# Patient Record
Sex: MALE | Race: WHITE | NOT HISPANIC OR LATINO | Employment: OTHER | URBAN - NONMETROPOLITAN AREA
[De-identification: names, ages, dates, MRNs, and addresses within clinical notes are randomized per-mention and may not be internally consistent; named-entity substitution may affect disease eponyms.]

---

## 2021-12-30 ENCOUNTER — HOSPITAL ENCOUNTER (EMERGENCY)
Facility: HOSPITAL | Age: 61
Discharge: LEFT BEFORE DISCHARGE | End: 2021-12-30
Attending: EMERGENCY MEDICINE | Admitting: EMERGENCY MEDICINE

## 2021-12-30 ENCOUNTER — APPOINTMENT (OUTPATIENT)
Dept: GENERAL RADIOLOGY | Facility: HOSPITAL | Age: 61
End: 2021-12-30

## 2021-12-30 ENCOUNTER — APPOINTMENT (OUTPATIENT)
Dept: CT IMAGING | Facility: HOSPITAL | Age: 61
End: 2021-12-30

## 2021-12-30 VITALS
WEIGHT: 192 LBS | BODY MASS INDEX: 23.87 KG/M2 | SYSTOLIC BLOOD PRESSURE: 145 MMHG | HEIGHT: 75 IN | HEART RATE: 138 BPM | DIASTOLIC BLOOD PRESSURE: 92 MMHG | TEMPERATURE: 97.8 F | RESPIRATION RATE: 16 BRPM | OXYGEN SATURATION: 93 %

## 2021-12-30 DIAGNOSIS — I48.92 ATRIAL FLUTTER, UNSPECIFIED TYPE: Primary | ICD-10-CM

## 2021-12-30 LAB
ALBUMIN SERPL-MCNC: 4 G/DL (ref 3.5–5.2)
ALBUMIN/GLOB SERPL: 1.6 G/DL
ALP SERPL-CCNC: 85 U/L (ref 39–117)
ALT SERPL W P-5'-P-CCNC: 17 U/L (ref 1–41)
ANION GAP SERPL CALCULATED.3IONS-SCNC: 14 MMOL/L (ref 5–15)
AST SERPL-CCNC: 22 U/L (ref 1–40)
BASOPHILS # BLD AUTO: 0.03 10*3/MM3 (ref 0–0.2)
BASOPHILS NFR BLD AUTO: 0.4 % (ref 0–1.5)
BILIRUB SERPL-MCNC: 0.9 MG/DL (ref 0–1.2)
BUN SERPL-MCNC: 26 MG/DL (ref 8–23)
BUN/CREAT SERPL: 25.7 (ref 7–25)
CALCIUM SPEC-SCNC: 9.1 MG/DL (ref 8.6–10.5)
CHLORIDE SERPL-SCNC: 101 MMOL/L (ref 98–107)
CO2 SERPL-SCNC: 20 MMOL/L (ref 22–29)
CREAT SERPL-MCNC: 1.01 MG/DL (ref 0.76–1.27)
DEPRECATED RDW RBC AUTO: 46.3 FL (ref 37–54)
EOSINOPHIL # BLD AUTO: 0.02 10*3/MM3 (ref 0–0.4)
EOSINOPHIL NFR BLD AUTO: 0.3 % (ref 0.3–6.2)
ERYTHROCYTE [DISTWIDTH] IN BLOOD BY AUTOMATED COUNT: 13.6 % (ref 12.3–15.4)
FLUAV SUBTYP SPEC NAA+PROBE: NOT DETECTED
FLUBV RNA ISLT QL NAA+PROBE: NOT DETECTED
GFR SERPL CREATININE-BSD FRML MDRD: 75 ML/MIN/1.73
GLOBULIN UR ELPH-MCNC: 2.5 GM/DL
GLUCOSE SERPL-MCNC: 151 MG/DL (ref 65–99)
HCT VFR BLD AUTO: 45.6 % (ref 37.5–51)
HGB BLD-MCNC: 15.3 G/DL (ref 13–17.7)
HOLD SPECIMEN: NORMAL
IMM GRANULOCYTES # BLD AUTO: 0.02 10*3/MM3 (ref 0–0.05)
IMM GRANULOCYTES NFR BLD AUTO: 0.3 % (ref 0–0.5)
LYMPHOCYTES # BLD AUTO: 1.41 10*3/MM3 (ref 0.7–3.1)
LYMPHOCYTES NFR BLD AUTO: 19.1 % (ref 19.6–45.3)
MCH RBC QN AUTO: 31.4 PG (ref 26.6–33)
MCHC RBC AUTO-ENTMCNC: 33.6 G/DL (ref 31.5–35.7)
MCV RBC AUTO: 93.4 FL (ref 79–97)
MONOCYTES # BLD AUTO: 0.48 10*3/MM3 (ref 0.1–0.9)
MONOCYTES NFR BLD AUTO: 6.5 % (ref 5–12)
NEUTROPHILS NFR BLD AUTO: 5.42 10*3/MM3 (ref 1.7–7)
NEUTROPHILS NFR BLD AUTO: 73.4 % (ref 42.7–76)
NRBC BLD AUTO-RTO: 0 /100 WBC (ref 0–0.2)
NT-PROBNP SERPL-MCNC: 7792 PG/ML (ref 0–900)
PLATELET # BLD AUTO: 192 10*3/MM3 (ref 140–450)
PMV BLD AUTO: 11.1 FL (ref 6–12)
POTASSIUM SERPL-SCNC: 3.8 MMOL/L (ref 3.5–5.2)
PROT SERPL-MCNC: 6.5 G/DL (ref 6–8.5)
QT INTERVAL: 208 MS
QT INTERVAL: 260 MS
QTC INTERVAL: 316 MS
QTC INTERVAL: 471 MS
RBC # BLD AUTO: 4.88 10*6/MM3 (ref 4.14–5.8)
SARS-COV-2 RNA PNL SPEC NAA+PROBE: NOT DETECTED
SODIUM SERPL-SCNC: 135 MMOL/L (ref 136–145)
TROPONIN T SERPL-MCNC: <0.01 NG/ML (ref 0–0.03)
WBC NRBC COR # BLD: 7.38 10*3/MM3 (ref 3.4–10.8)
WHOLE BLOOD HOLD SPECIMEN: NORMAL

## 2021-12-30 PROCEDURE — 85025 COMPLETE CBC W/AUTO DIFF WBC: CPT | Performed by: PHYSICIAN ASSISTANT

## 2021-12-30 PROCEDURE — 87636 SARSCOV2 & INF A&B AMP PRB: CPT | Performed by: PHYSICIAN ASSISTANT

## 2021-12-30 PROCEDURE — 99283 EMERGENCY DEPT VISIT LOW MDM: CPT

## 2021-12-30 PROCEDURE — 96376 TX/PRO/DX INJ SAME DRUG ADON: CPT

## 2021-12-30 PROCEDURE — 71275 CT ANGIOGRAPHY CHEST: CPT

## 2021-12-30 PROCEDURE — 71045 X-RAY EXAM CHEST 1 VIEW: CPT

## 2021-12-30 PROCEDURE — 84484 ASSAY OF TROPONIN QUANT: CPT | Performed by: PHYSICIAN ASSISTANT

## 2021-12-30 PROCEDURE — 93005 ELECTROCARDIOGRAM TRACING: CPT | Performed by: EMERGENCY MEDICINE

## 2021-12-30 PROCEDURE — 36415 COLL VENOUS BLD VENIPUNCTURE: CPT

## 2021-12-30 PROCEDURE — 93005 ELECTROCARDIOGRAM TRACING: CPT | Performed by: PHYSICIAN ASSISTANT

## 2021-12-30 PROCEDURE — 93010 ELECTROCARDIOGRAM REPORT: CPT | Performed by: INTERNAL MEDICINE

## 2021-12-30 PROCEDURE — 83880 ASSAY OF NATRIURETIC PEPTIDE: CPT | Performed by: PHYSICIAN ASSISTANT

## 2021-12-30 PROCEDURE — 96365 THER/PROPH/DIAG IV INF INIT: CPT

## 2021-12-30 PROCEDURE — 80053 COMPREHEN METABOLIC PANEL: CPT | Performed by: PHYSICIAN ASSISTANT

## 2021-12-30 PROCEDURE — 0 IOPAMIDOL PER 1 ML: Performed by: EMERGENCY MEDICINE

## 2021-12-30 RX ORDER — DILTIAZEM HYDROCHLORIDE 180 MG/1
180 CAPSULE, EXTENDED RELEASE ORAL DAILY
Qty: 14 CAPSULE | Refills: 0 | Status: SHIPPED | OUTPATIENT
Start: 2021-12-30 | End: 2022-01-08 | Stop reason: HOSPADM

## 2021-12-30 RX ORDER — FUROSEMIDE 10 MG/ML
60 INJECTION INTRAMUSCULAR; INTRAVENOUS ONCE
Status: DISCONTINUED | OUTPATIENT
Start: 2021-12-30 | End: 2021-12-30 | Stop reason: HOSPADM

## 2021-12-30 RX ORDER — VENLAFAXINE 75 MG/1
75 TABLET ORAL 2 TIMES DAILY
COMMUNITY

## 2021-12-30 RX ORDER — OMEPRAZOLE 40 MG/1
40 CAPSULE, DELAYED RELEASE ORAL DAILY
COMMUNITY

## 2021-12-30 RX ORDER — TAMSULOSIN HYDROCHLORIDE 0.4 MG/1
1 CAPSULE ORAL DAILY
COMMUNITY

## 2021-12-30 RX ORDER — SODIUM CHLORIDE 0.9 % (FLUSH) 0.9 %
10 SYRINGE (ML) INJECTION AS NEEDED
Status: DISCONTINUED | OUTPATIENT
Start: 2021-12-30 | End: 2021-12-30 | Stop reason: HOSPADM

## 2021-12-30 RX ORDER — DILTIAZEM HYDROCHLORIDE 5 MG/ML
20 INJECTION INTRAVENOUS ONCE
Status: COMPLETED | OUTPATIENT
Start: 2021-12-30 | End: 2021-12-30

## 2021-12-30 RX ADMIN — IOPAMIDOL 82 ML: 755 INJECTION, SOLUTION INTRAVENOUS at 12:07

## 2021-12-30 RX ADMIN — SODIUM CHLORIDE 5 MG/HR: 900 INJECTION, SOLUTION INTRAVENOUS at 11:20

## 2021-12-30 RX ADMIN — DILTIAZEM HYDROCHLORIDE 20 MG: 5 INJECTION INTRAVENOUS at 10:41

## 2021-12-31 ENCOUNTER — APPOINTMENT (OUTPATIENT)
Dept: CARDIOLOGY | Facility: HOSPITAL | Age: 61
End: 2021-12-31

## 2021-12-31 ENCOUNTER — APPOINTMENT (OUTPATIENT)
Dept: GENERAL RADIOLOGY | Facility: HOSPITAL | Age: 61
End: 2021-12-31

## 2021-12-31 ENCOUNTER — HOSPITAL ENCOUNTER (INPATIENT)
Facility: HOSPITAL | Age: 61
LOS: 8 days | Discharge: HOME | End: 2022-01-08
Attending: EMERGENCY MEDICINE | Admitting: INTERNAL MEDICINE

## 2021-12-31 DIAGNOSIS — R60.9 PERIPHERAL EDEMA: ICD-10-CM

## 2021-12-31 DIAGNOSIS — F41.9 ANXIETY: ICD-10-CM

## 2021-12-31 DIAGNOSIS — I48.92 ATRIAL FLUTTER WITH RAPID VENTRICULAR RESPONSE: Primary | ICD-10-CM

## 2021-12-31 DIAGNOSIS — I50.9 ACUTE CONGESTIVE HEART FAILURE, UNSPECIFIED HEART FAILURE TYPE: ICD-10-CM

## 2021-12-31 DIAGNOSIS — R06.09 DOE (DYSPNEA ON EXERTION): ICD-10-CM

## 2021-12-31 PROBLEM — I50.22 CHRONIC SYSTOLIC CHF (CONGESTIVE HEART FAILURE): Status: ACTIVE | Noted: 2021-12-31

## 2021-12-31 PROBLEM — I48.0 PAROXYSMAL ATRIAL FIBRILLATION WITH RAPID VENTRICULAR RESPONSE: Status: ACTIVE | Noted: 2021-12-31

## 2021-12-31 LAB
ALBUMIN SERPL-MCNC: 3.8 G/DL (ref 3.5–5.2)
ALBUMIN/GLOB SERPL: 1.3 G/DL
ALP SERPL-CCNC: 87 U/L (ref 39–117)
ALT SERPL W P-5'-P-CCNC: 16 U/L (ref 1–41)
ANION GAP SERPL CALCULATED.3IONS-SCNC: 12 MMOL/L (ref 5–15)
AST SERPL-CCNC: 23 U/L (ref 1–40)
BASOPHILS # BLD AUTO: 0.03 10*3/MM3 (ref 0–0.2)
BASOPHILS NFR BLD AUTO: 0.4 % (ref 0–1.5)
BILIRUB SERPL-MCNC: 1.3 MG/DL (ref 0–1.2)
BUN SERPL-MCNC: 22 MG/DL (ref 8–23)
BUN/CREAT SERPL: 19.6 (ref 7–25)
CALCIUM SPEC-SCNC: 9.1 MG/DL (ref 8.6–10.5)
CHLORIDE SERPL-SCNC: 103 MMOL/L (ref 98–107)
CO2 SERPL-SCNC: 24 MMOL/L (ref 22–29)
CREAT SERPL-MCNC: 1.12 MG/DL (ref 0.76–1.27)
D-DIMER, QUANTITATIVE (MAD,POW, STR): 740 NG/ML (FEU) (ref 0–470)
DEPRECATED RDW RBC AUTO: 47.7 FL (ref 37–54)
EOSINOPHIL # BLD AUTO: 0.07 10*3/MM3 (ref 0–0.4)
EOSINOPHIL NFR BLD AUTO: 0.9 % (ref 0.3–6.2)
ERYTHROCYTE [DISTWIDTH] IN BLOOD BY AUTOMATED COUNT: 13.7 % (ref 12.3–15.4)
GFR SERPL CREATININE-BSD FRML MDRD: 66 ML/MIN/1.73
GLOBULIN UR ELPH-MCNC: 2.9 GM/DL
GLUCOSE SERPL-MCNC: 142 MG/DL (ref 65–99)
HCT VFR BLD AUTO: 44.9 % (ref 37.5–51)
HGB BLD-MCNC: 15.1 G/DL (ref 13–17.7)
HOLD SPECIMEN: NORMAL
IMM GRANULOCYTES # BLD AUTO: 0.02 10*3/MM3 (ref 0–0.05)
IMM GRANULOCYTES NFR BLD AUTO: 0.3 % (ref 0–0.5)
LYMPHOCYTES # BLD AUTO: 1.6 10*3/MM3 (ref 0.7–3.1)
LYMPHOCYTES NFR BLD AUTO: 21.5 % (ref 19.6–45.3)
MCH RBC QN AUTO: 31.9 PG (ref 26.6–33)
MCHC RBC AUTO-ENTMCNC: 33.6 G/DL (ref 31.5–35.7)
MCV RBC AUTO: 94.9 FL (ref 79–97)
MONOCYTES # BLD AUTO: 0.61 10*3/MM3 (ref 0.1–0.9)
MONOCYTES NFR BLD AUTO: 8.2 % (ref 5–12)
NEUTROPHILS NFR BLD AUTO: 5.1 10*3/MM3 (ref 1.7–7)
NEUTROPHILS NFR BLD AUTO: 68.7 % (ref 42.7–76)
NRBC BLD AUTO-RTO: 0 /100 WBC (ref 0–0.2)
NT-PROBNP SERPL-MCNC: 6616 PG/ML (ref 0–900)
PLATELET # BLD AUTO: 191 10*3/MM3 (ref 140–450)
PMV BLD AUTO: 10.8 FL (ref 6–12)
POTASSIUM SERPL-SCNC: 3.8 MMOL/L (ref 3.5–5.2)
PROT SERPL-MCNC: 6.7 G/DL (ref 6–8.5)
RBC # BLD AUTO: 4.73 10*6/MM3 (ref 4.14–5.8)
SODIUM SERPL-SCNC: 139 MMOL/L (ref 136–145)
TROPONIN T SERPL-MCNC: <0.01 NG/ML (ref 0–0.03)
WBC NRBC COR # BLD: 7.43 10*3/MM3 (ref 3.4–10.8)
WHOLE BLOOD HOLD SPECIMEN: NORMAL
WHOLE BLOOD HOLD SPECIMEN: NORMAL

## 2021-12-31 PROCEDURE — 25010000002 AMIODARONE IN DEXTROSE 5% 150-4.21 MG/100ML-% SOLUTION: Performed by: INTERNAL MEDICINE

## 2021-12-31 PROCEDURE — 25010000002 ONDANSETRON PER 1 MG

## 2021-12-31 PROCEDURE — 25010000002 AMIODARONE IN DEXTROSE 5% 360-4.14 MG/200ML-% SOLUTION: Performed by: INTERNAL MEDICINE

## 2021-12-31 PROCEDURE — 80053 COMPREHEN METABOLIC PANEL: CPT | Performed by: EMERGENCY MEDICINE

## 2021-12-31 PROCEDURE — 93306 TTE W/DOPPLER COMPLETE: CPT | Performed by: INTERNAL MEDICINE

## 2021-12-31 PROCEDURE — 99284 EMERGENCY DEPT VISIT MOD MDM: CPT

## 2021-12-31 PROCEDURE — 84484 ASSAY OF TROPONIN QUANT: CPT | Performed by: INTERNAL MEDICINE

## 2021-12-31 PROCEDURE — 93005 ELECTROCARDIOGRAM TRACING: CPT | Performed by: EMERGENCY MEDICINE

## 2021-12-31 PROCEDURE — 25010000002 FUROSEMIDE PER 20 MG

## 2021-12-31 PROCEDURE — 25010000002 ENOXAPARIN PER 10 MG: Performed by: EMERGENCY MEDICINE

## 2021-12-31 PROCEDURE — 71045 X-RAY EXAM CHEST 1 VIEW: CPT

## 2021-12-31 PROCEDURE — 93010 ELECTROCARDIOGRAM REPORT: CPT | Performed by: INTERNAL MEDICINE

## 2021-12-31 PROCEDURE — 83880 ASSAY OF NATRIURETIC PEPTIDE: CPT | Performed by: EMERGENCY MEDICINE

## 2021-12-31 PROCEDURE — 85379 FIBRIN DEGRADATION QUANT: CPT | Performed by: INTERNAL MEDICINE

## 2021-12-31 PROCEDURE — 36415 COLL VENOUS BLD VENIPUNCTURE: CPT

## 2021-12-31 PROCEDURE — 25010000002 MORPHINE PER 10 MG

## 2021-12-31 PROCEDURE — 84484 ASSAY OF TROPONIN QUANT: CPT | Performed by: EMERGENCY MEDICINE

## 2021-12-31 PROCEDURE — 93005 ELECTROCARDIOGRAM TRACING: CPT | Performed by: INTERNAL MEDICINE

## 2021-12-31 PROCEDURE — 93306 TTE W/DOPPLER COMPLETE: CPT

## 2021-12-31 PROCEDURE — 85025 COMPLETE CBC W/AUTO DIFF WBC: CPT | Performed by: EMERGENCY MEDICINE

## 2021-12-31 RX ORDER — AMIODARONE HYDROCHLORIDE 200 MG/1
200 TABLET ORAL EVERY 12 HOURS
Status: DISCONTINUED | OUTPATIENT
Start: 2022-01-11 | End: 2022-01-01

## 2021-12-31 RX ORDER — SODIUM CHLORIDE 0.9 % (FLUSH) 0.9 %
10 SYRINGE (ML) INJECTION AS NEEDED
Status: DISCONTINUED | OUTPATIENT
Start: 2021-12-31 | End: 2022-01-08 | Stop reason: HOSPADM

## 2021-12-31 RX ORDER — TAMSULOSIN HYDROCHLORIDE 0.4 MG/1
0.4 CAPSULE ORAL NIGHTLY
Status: DISCONTINUED | OUTPATIENT
Start: 2021-12-31 | End: 2022-01-05

## 2021-12-31 RX ORDER — FUROSEMIDE 10 MG/ML
INJECTION INTRAMUSCULAR; INTRAVENOUS
Status: DISPENSED
Start: 2021-12-31 | End: 2022-01-01

## 2021-12-31 RX ORDER — AMIODARONE HYDROCHLORIDE 200 MG/1
200 TABLET ORAL DAILY
Status: DISCONTINUED | OUTPATIENT
Start: 2022-01-26 | End: 2022-01-01

## 2021-12-31 RX ORDER — ASPIRIN 81 MG/1
325 TABLET, CHEWABLE ORAL DAILY
COMMUNITY

## 2021-12-31 RX ORDER — FUROSEMIDE 10 MG/ML
INJECTION INTRAMUSCULAR; INTRAVENOUS
Status: COMPLETED
Start: 2021-12-31 | End: 2021-12-31

## 2021-12-31 RX ORDER — PANTOPRAZOLE SODIUM 40 MG/1
40 TABLET, DELAYED RELEASE ORAL EVERY MORNING
Status: DISCONTINUED | OUTPATIENT
Start: 2021-12-31 | End: 2021-12-31

## 2021-12-31 RX ORDER — SODIUM CHLORIDE 0.9 % (FLUSH) 0.9 %
10 SYRINGE (ML) INJECTION EVERY 12 HOURS SCHEDULED
Status: DISCONTINUED | OUTPATIENT
Start: 2021-12-31 | End: 2022-01-08 | Stop reason: HOSPADM

## 2021-12-31 RX ORDER — ONDANSETRON 2 MG/ML
INJECTION INTRAMUSCULAR; INTRAVENOUS
Status: COMPLETED
Start: 2021-12-31 | End: 2021-12-31

## 2021-12-31 RX ORDER — MORPHINE SULFATE 2 MG/ML
2 INJECTION, SOLUTION INTRAMUSCULAR; INTRAVENOUS
Status: DISPENSED | OUTPATIENT
Start: 2021-12-31 | End: 2022-01-07

## 2021-12-31 RX ORDER — VENLAFAXINE 75 MG/1
75 TABLET ORAL 2 TIMES DAILY
Status: DISCONTINUED | OUTPATIENT
Start: 2021-12-31 | End: 2022-01-08 | Stop reason: HOSPADM

## 2021-12-31 RX ORDER — FUROSEMIDE 10 MG/ML
60 INJECTION INTRAMUSCULAR; INTRAVENOUS EVERY 12 HOURS
Status: DISCONTINUED | OUTPATIENT
Start: 2022-01-01 | End: 2021-12-31 | Stop reason: SDUPTHER

## 2021-12-31 RX ORDER — LOSARTAN POTASSIUM 25 MG/1
25 TABLET ORAL
Status: DISCONTINUED | OUTPATIENT
Start: 2021-12-31 | End: 2021-12-31

## 2021-12-31 RX ORDER — PANTOPRAZOLE SODIUM 40 MG/10ML
40 INJECTION, POWDER, LYOPHILIZED, FOR SOLUTION INTRAVENOUS
Status: DISCONTINUED | OUTPATIENT
Start: 2021-12-31 | End: 2022-01-08 | Stop reason: HOSPADM

## 2021-12-31 RX ORDER — AMIODARONE HYDROCHLORIDE 200 MG/1
200 TABLET ORAL EVERY 12 HOURS SCHEDULED
Status: DISCONTINUED | OUTPATIENT
Start: 2022-01-01 | End: 2022-01-01

## 2021-12-31 RX ORDER — MORPHINE SULFATE 2 MG/ML
INJECTION, SOLUTION INTRAMUSCULAR; INTRAVENOUS
Status: COMPLETED
Start: 2021-12-31 | End: 2021-12-31

## 2021-12-31 RX ORDER — FUROSEMIDE 10 MG/ML
60 INJECTION INTRAMUSCULAR; INTRAVENOUS EVERY 12 HOURS
Status: DISCONTINUED | OUTPATIENT
Start: 2021-12-31 | End: 2022-01-01

## 2021-12-31 RX ORDER — ONDANSETRON 2 MG/ML
4 INJECTION INTRAMUSCULAR; INTRAVENOUS EVERY 6 HOURS PRN
Status: DISCONTINUED | OUTPATIENT
Start: 2021-12-31 | End: 2022-01-08 | Stop reason: HOSPADM

## 2021-12-31 RX ORDER — FUROSEMIDE 10 MG/ML
20 INJECTION INTRAMUSCULAR; INTRAVENOUS EVERY 12 HOURS
Status: DISCONTINUED | OUTPATIENT
Start: 2021-12-31 | End: 2021-12-31

## 2021-12-31 RX ORDER — FUROSEMIDE 10 MG/ML
20 INJECTION INTRAMUSCULAR; INTRAVENOUS ONCE
Status: DISCONTINUED | OUTPATIENT
Start: 2021-12-31 | End: 2021-12-31

## 2021-12-31 RX ORDER — AMIODARONE HYDROCHLORIDE 200 MG/1
200 TABLET ORAL ONCE
Status: DISCONTINUED | OUTPATIENT
Start: 2022-01-01 | End: 2022-01-01

## 2021-12-31 RX ADMIN — ONDANSETRON 4 MG: 2 INJECTION INTRAMUSCULAR; INTRAVENOUS at 16:58

## 2021-12-31 RX ADMIN — AMIODARONE HYDROCHLORIDE 0.5 MG/MIN: 1.8 INJECTION, SOLUTION INTRAVENOUS at 20:45

## 2021-12-31 RX ADMIN — LOSARTAN POTASSIUM 25 MG: 25 TABLET, FILM COATED ORAL at 18:22

## 2021-12-31 RX ADMIN — APIXABAN 5 MG: 5 TABLET, FILM COATED ORAL at 14:37

## 2021-12-31 RX ADMIN — TAMSULOSIN HYDROCHLORIDE 0.4 MG: 0.4 CAPSULE ORAL at 20:03

## 2021-12-31 RX ADMIN — PANTOPRAZOLE SODIUM 40 MG: 40 INJECTION, POWDER, FOR SOLUTION INTRAVENOUS at 19:51

## 2021-12-31 RX ADMIN — MORPHINE SULFATE 2 MG: 2 INJECTION, SOLUTION INTRAMUSCULAR; INTRAVENOUS at 16:59

## 2021-12-31 RX ADMIN — FUROSEMIDE 60 MG: 10 INJECTION, SOLUTION INTRAMUSCULAR; INTRAVENOUS at 18:20

## 2021-12-31 RX ADMIN — SODIUM CHLORIDE, PRESERVATIVE FREE 10 ML: 5 INJECTION INTRAVENOUS at 20:05

## 2021-12-31 RX ADMIN — FUROSEMIDE 20 MG: 10 INJECTION INTRAMUSCULAR; INTRAVENOUS at 16:59

## 2021-12-31 RX ADMIN — VENLAFAXINE 75 MG: 75 TABLET ORAL at 18:22

## 2021-12-31 RX ADMIN — FUROSEMIDE 20 MG: 10 INJECTION, SOLUTION INTRAMUSCULAR; INTRAVENOUS at 16:59

## 2021-12-31 RX ADMIN — METOPROLOL TARTRATE 25 MG: 25 TABLET, FILM COATED ORAL at 14:37

## 2021-12-31 RX ADMIN — PANTOPRAZOLE SODIUM 40 MG: 40 TABLET, DELAYED RELEASE ORAL at 14:36

## 2021-12-31 RX ADMIN — APIXABAN 5 MG: 5 TABLET, FILM COATED ORAL at 20:03

## 2021-12-31 RX ADMIN — AMIODARONE HYDROCHLORIDE 1 MG/MIN: 1.8 INJECTION, SOLUTION INTRAVENOUS at 14:36

## 2021-12-31 RX ADMIN — AMIODARONE HYDROCHLORIDE 150 MG: 1.5 INJECTION, SOLUTION INTRAVENOUS at 13:25

## 2021-12-31 RX ADMIN — ENOXAPARIN SODIUM 90 MG: 100 INJECTION SUBCUTANEOUS at 09:39

## 2021-12-31 RX ADMIN — SODIUM CHLORIDE 5 MG/HR: 900 INJECTION, SOLUTION INTRAVENOUS at 09:37

## 2022-01-01 PROBLEM — E44.0 MODERATE MALNUTRITION: Status: ACTIVE | Noted: 2022-01-01

## 2022-01-01 PROBLEM — I50.23 ACUTE ON CHRONIC SYSTOLIC CHF (CONGESTIVE HEART FAILURE): Status: ACTIVE | Noted: 2021-12-31

## 2022-01-01 LAB
LV EF 2D ECHO EST: 22 %
MAXIMAL PREDICTED HEART RATE: 157 BPM
STRESS TARGET HR: 133 BPM

## 2022-01-01 PROCEDURE — 25010000002 AMIODARONE IN DEXTROSE 5% 360-4.14 MG/200ML-% SOLUTION: Performed by: INTERNAL MEDICINE

## 2022-01-01 PROCEDURE — 25010000002 PROCHLORPERAZINE 10 MG/2ML SOLUTION: Performed by: FAMILY MEDICINE

## 2022-01-01 PROCEDURE — 25010000002 FUROSEMIDE PER 20 MG: Performed by: INTERNAL MEDICINE

## 2022-01-01 PROCEDURE — 93005 ELECTROCARDIOGRAM TRACING: CPT | Performed by: INTERNAL MEDICINE

## 2022-01-01 PROCEDURE — 93010 ELECTROCARDIOGRAM REPORT: CPT | Performed by: INTERNAL MEDICINE

## 2022-01-01 PROCEDURE — 25010000002 ONDANSETRON PER 1 MG: Performed by: INTERNAL MEDICINE

## 2022-01-01 PROCEDURE — 99222 1ST HOSP IP/OBS MODERATE 55: CPT | Performed by: INTERNAL MEDICINE

## 2022-01-01 PROCEDURE — 25010000002 MORPHINE PER 10 MG: Performed by: INTERNAL MEDICINE

## 2022-01-01 RX ORDER — PROCHLORPERAZINE EDISYLATE 5 MG/ML
10 INJECTION INTRAMUSCULAR; INTRAVENOUS EVERY 4 HOURS PRN
Status: DISCONTINUED | OUTPATIENT
Start: 2022-01-01 | End: 2022-01-08 | Stop reason: HOSPADM

## 2022-01-01 RX ORDER — PROCHLORPERAZINE 25 MG
25 SUPPOSITORY, RECTAL RECTAL EVERY 12 HOURS PRN
Status: DISCONTINUED | OUTPATIENT
Start: 2022-01-01 | End: 2022-01-08 | Stop reason: HOSPADM

## 2022-01-01 RX ORDER — AMIODARONE HYDROCHLORIDE 200 MG/1
200 TABLET ORAL EVERY 12 HOURS SCHEDULED
Status: DISCONTINUED | OUTPATIENT
Start: 2022-01-01 | End: 2022-01-04

## 2022-01-01 RX ORDER — FUROSEMIDE 10 MG/ML
40 INJECTION INTRAMUSCULAR; INTRAVENOUS EVERY 12 HOURS
Status: DISCONTINUED | OUTPATIENT
Start: 2022-01-01 | End: 2022-01-02

## 2022-01-01 RX ORDER — PROCHLORPERAZINE MALEATE 5 MG/1
5 TABLET ORAL EVERY 6 HOURS PRN
Status: DISCONTINUED | OUTPATIENT
Start: 2022-01-01 | End: 2022-01-08 | Stop reason: HOSPADM

## 2022-01-01 RX ADMIN — ONDANSETRON 4 MG: 2 INJECTION INTRAMUSCULAR; INTRAVENOUS at 21:10

## 2022-01-01 RX ADMIN — METOPROLOL TARTRATE 12.5 MG: 25 TABLET, FILM COATED ORAL at 08:39

## 2022-01-01 RX ADMIN — SODIUM CHLORIDE, PRESERVATIVE FREE 10 ML: 5 INJECTION INTRAVENOUS at 08:40

## 2022-01-01 RX ADMIN — MORPHINE SULFATE 2 MG: 2 INJECTION, SOLUTION INTRAMUSCULAR; INTRAVENOUS at 21:10

## 2022-01-01 RX ADMIN — AMIODARONE HYDROCHLORIDE 200 MG: 200 TABLET ORAL at 21:51

## 2022-01-01 RX ADMIN — FUROSEMIDE 40 MG: 10 INJECTION, SOLUTION INTRAVENOUS at 18:16

## 2022-01-01 RX ADMIN — AMIODARONE HYDROCHLORIDE 0.5 MG/MIN: 1.8 INJECTION, SOLUTION INTRAVENOUS at 10:01

## 2022-01-01 RX ADMIN — VENLAFAXINE 75 MG: 75 TABLET ORAL at 08:39

## 2022-01-01 RX ADMIN — AMIODARONE HYDROCHLORIDE 200 MG: 200 TABLET ORAL at 13:57

## 2022-01-01 RX ADMIN — ONDANSETRON 4 MG: 2 INJECTION INTRAMUSCULAR; INTRAVENOUS at 11:59

## 2022-01-01 RX ADMIN — PROCHLORPERAZINE EDISYLATE 10 MG: 5 INJECTION INTRAMUSCULAR; INTRAVENOUS at 22:22

## 2022-01-01 RX ADMIN — FUROSEMIDE 60 MG: 10 INJECTION, SOLUTION INTRAMUSCULAR; INTRAVENOUS at 06:01

## 2022-01-01 RX ADMIN — APIXABAN 5 MG: 5 TABLET, FILM COATED ORAL at 08:39

## 2022-01-01 RX ADMIN — AMIODARONE HYDROCHLORIDE 0.5 MG/MIN: 1.8 INJECTION, SOLUTION INTRAVENOUS at 23:49

## 2022-01-01 RX ADMIN — PANTOPRAZOLE SODIUM 40 MG: 40 INJECTION, POWDER, FOR SOLUTION INTRAVENOUS at 06:01

## 2022-01-01 RX ADMIN — SODIUM CHLORIDE, PRESERVATIVE FREE 10 ML: 5 INJECTION INTRAVENOUS at 22:10

## 2022-01-01 NOTE — CONSULTS
"Adult Nutrition  Assessment    Patient Name:  Kd Campa  YOB: 1960  MRN: [id number]  Admit Date:  12/31/2021    Assessment Date:  1/1/2022    Comments:  62yo male admit w/ Afib w/ RVR and hx CHF- amio gtt in place w/ IV lasix BID. RN notes minimal change in UOP after administration lasix. Cardiac diet, intake 75% this am. # w/ BMI 27.0. No hx available in Epic. pt states no c/s problems despite missing some teeth. He states he WILL NOT follow low Na daiet- has been told to take his diuretics and \"everything in moderation\" and he will be fine. Pt also reports he has decreased appetite and wt loss of 20# in past few months. He states he lives alone and just \"doenst think about eating\". Noted mild to  Moderate fat loss and muscle wasting to upper body. Pt with 8% wt loss w/ ? Time frame. Pt meets criteria for moderate, chronic malnutrition at this time r/t not meeting EEN d/t decreased po. Discussed nutrient dense foods, and eating multiple small meals/day. Will attach diet inforamtion re: high colton/pro foods and encouraged to keep salt use low. RD to follow hospital course.     Reason for Assessment     Row Name 01/01/22 1148          Reason for Assessment    Reason For Assessment nurse/nurse practitioner consult     Diagnosis cardiac disease     Identified At Risk by Screening Criteria MST SCORE 2+; reduced oral intake over the last month; unintentional loss of 10 lbs or more in the past 2 mos                Nutrition/Diet History     Row Name 01/01/22 1145          Nutrition/Diet History    Typical Food/Fluid Intake pt states nkfa, no c/s problems despite missing some teeth. He states he WILL NOT follow low Na daiet- has been told to take his diuretics and \"everything in moderation\" and he will be fine. Pt also reports he has decreased appetite and wt loss of 20# in past few months. He states he livves alone and just \"doenst tthink about eating\". Discussed nutrient dense foods, and " "eating multiple small meals/day. Will attach diet inforamtion re\" high colton/pro foods and encouraged to keep salt use low.     Food Preferences likes milk/cereal, likes ensure, cottage cheese     Factors Affecting Nutritional Intake anorexia                Anthropometrics     Row Name 01/01/22 0404          Anthropometrics    Weight 98.2 kg (216 lb 7.9 oz)                Labs/Tests/Procedures/Meds     Row Name 01/01/22 1152          Labs/Procedures/Meds    Lab Results Reviewed reviewed     Lab Results Comments Glu 142, alb 3.1L            Diagnostic Tests/Procedures    Diagnostic Test/Procedure Reviewed reviewed            Medications    Pertinent Medications Reviewed reviewed     Pertinent Medications Comments amio gtt, IV lasix BID                  Estimated/Assessed Needs     Row Name 01/01/22 1152          Calculation Measurements    Weight Used For Calculations 98 kg (216 lb)            Estimated/Assessed Needs    Additional Documentation Calorie Requirements (Group); KCAL/KG (Group); Protein Requirements (Group); Fluid Requirements (Group)            Calorie Requirements    Estimated Calorie Requirement (kcal/day) 2500            KCAL/KG    KCAL/KG 25 Kcal/Kg (kcal)     25 Kcal/Kg (kcal) 2449.425            Protein Requirements    Weight Used For Protein Calculations 98 kg (216 lb)     Est Protein Requirement Amount (gms/kg) 1.0 gm protein     Estimated Protein Requirements (gms/day) 97.98            Fluid Requirements    Fluid Requirements (mL/day) 2000     RDA Method (mL) 2000                Nutrition Prescription Ordered     Row Name 01/01/22 1153          Nutrition Prescription PO    Current PO Diet Regular     Common Modifiers Cardiac                Evaluation of Received Nutrient/Fluid Intake     Row Name 01/01/22 1153          PO Evaluation    Number of Meals 1     % PO Intake 75                  Malnutrition Severity Assessment     Row Name 01/01/22 1153          Malnutrition Severity Assessment    " "Malnutrition Type Chronic Disease - Related Malnutrition            Insufficient Energy Intake     Insufficient Energy Intake Findings Moderate     Insufficient Energy Intake  --  decreased for \"some time\"            Unintentional Weight Loss     Unintentional Weight Loss Findings Moderate     Unintentional Weight Loss  --  wt loss 20#, unsur etime frame \"some time\"            Muscle Loss    Loss of Muscle Mass Findings Moderate     Mormon Region Moderate - slight depression     Clavicle Bone Region --  mild     Acromion Bone Region --  mild     Dorsal Hand Region Moderate - slight depression            Fat Loss    Subcutaneous Fat Loss Findings Moderate     Orbital Region  Moderate -  somewhat hollowness, slightly dark circles     Upper Arm Region Moderate - some fat tissue, not ample            Criteria Met (Must meet criteria for severity in at least 2 of these categories: M Wasting, Fat Loss, Fluid, Secondary Signs, Wt. Status, Intake)    Patient meets criteria for  Moderate (non-severe) Malnutrition                 Electronically signed by:  Gabrielle Lane RD  01/01/22 11:57 CST  "

## 2022-01-01 NOTE — PLAN OF CARE
Goal Outcome Evaluation:  Plan of Care Reviewed With: patient        Progress: no change  Outcome Summary: Pt on Amio gtt at 16.67ml/hr as of  20:45 on 12/31.  Pt Afib unresolved with periods of hypotension and 1 event of nausea/vomiting during a hypotensive event.  Pt is otherwise asymptomic, but 02 sats trend low into 80's during sleep.  Pt UOP is minimally responsive to lasix administration.  Will continue to monitor.

## 2022-01-01 NOTE — PLAN OF CARE
Goal Outcome Evaluation:  Plan of Care Reviewed With: patient   Pt remains on Amio gtt, V/S stable, except HR elevated, no fever, adequate urine output, BM x 2 today, nc now @ 2L - pt tolerating well

## 2022-01-01 NOTE — PROGRESS NOTES
Progress Note  Celso Méndez MD  Hospitalist    Date of visit: 1/1/2022     LOS: 1 day   Patient Care Team:  Estella Gutierrez MD as PCP - General (Family Medicine)    Chief Complaint: palpitations    Subjective     Interval History:     Patient Complaints: palpitations due to uncontrolled atrial fibrillation.    History taken from: patient / nursing    Medication Review:   Current Facility-Administered Medications   Medication Dose Route Frequency Provider Last Rate Last Admin   • amiodarone (PACERONE) tablet 200 mg  200 mg Oral Q12H Celso Méndez MD   200 mg at 01/01/22 1357   • amiodarone 360 mg in 200 mL D5W infusion  0.5 mg/min Intravenous Continuous Celso Méndez MD 16.67 mL/hr at 01/01/22 1001 0.5 mg/min at 01/01/22 1001   • amiodarone 360 mg in 200 mL D5W infusion  0.5 mg/min Intravenous Continuous Altagracia Mantilla MD 16.67 mL/hr at 01/01/22 1349 0.5 mg/min at 01/01/22 1349   • apixaban (ELIQUIS) tablet 5 mg  5 mg Oral Q12H Celso Méndez MD   5 mg at 01/01/22 0839   • furosemide (LASIX) injection 40 mg  40 mg Intravenous Q12H Celso Méndez MD       • metoprolol tartrate (LOPRESSOR) half tablet 12.5 mg  12.5 mg Oral Q12H Celso Méndez MD   12.5 mg at 01/01/22 0839   • morphine injection 2 mg  2 mg Intravenous Q2H PRN Celso Méndez MD   2 mg at 12/31/21 1659   • ondansetron (ZOFRAN) injection 4 mg  4 mg Intravenous Q6H PRN Celso Méndez MD   4 mg at 01/01/22 1159   • pantoprazole (PROTONIX) injection 40 mg  40 mg Intravenous Q AM Celso Méndez MD   40 mg at 01/01/22 0601   • sodium chloride 0.9 % flush 10 mL  10 mL Intravenous PRN Celso Méndez MD       • sodium chloride 0.9 % flush 10 mL  10 mL Intravenous Q12H Celso Méndez MD   10 mL at 01/01/22 0840   • sodium chloride 0.9 % flush 10 mL  10 mL Intravenous PRN Celso Méndez MD       • tamsulosin (FLOMAX) 24 hr capsule 0.4 mg  0.4 mg Oral Nightly Celso Méndez MD   0.4 mg at 12/31/21 2003   • venlafaxine (EFFEXOR) tablet 75 mg  75 mg  Oral BID Celso Méndez MD   75 mg at 01/01/22 0839       Review of Systems:   Review of Systems   Constitutional: Positive for fatigue. Negative for fever.   Respiratory: Positive for shortness of breath. Negative for cough and wheezing.    Cardiovascular: Positive for palpitations and leg swelling. Negative for chest pain.   Gastrointestinal: Negative for abdominal distention, anal bleeding, constipation, diarrhea, nausea and vomiting.   Genitourinary: Negative for flank pain, frequency, hematuria, penile pain and urgency.   Musculoskeletal: Positive for arthralgias.   Skin: Positive for pallor. Negative for color change and rash.   Neurological: Positive for weakness. Negative for syncope, speech difficulty and headaches.   Psychiatric/Behavioral: Negative for agitation, behavioral problems and confusion.   All other systems reviewed and are negative.      Objective     Vital Signs  Temp:  [96.4 °F (35.8 °C)-97.9 °F (36.6 °C)] 96.9 °F (36.1 °C)  Heart Rate:  [119-133] 119  Resp:  [19-32] 19  BP: ()/(51-91) 105/66    Physical Exam:  Physical Exam  Vitals reviewed.   Constitutional:       General: He is not in acute distress.     Appearance: He is ill-appearing.   HENT:      Head: Normocephalic and atraumatic.   Eyes:      General: No scleral icterus.     Extraocular Movements: Extraocular movements intact.      Pupils: Pupils are equal, round, and reactive to light.   Cardiovascular:      Rate and Rhythm: Tachycardia present. Rhythm irregular.   Pulmonary:      Effort: Pulmonary effort is normal. No respiratory distress.      Breath sounds: No stridor. No wheezing or rales.   Chest:      Chest wall: No tenderness.   Abdominal:      General: Bowel sounds are normal. There is no distension.      Palpations: Abdomen is soft. There is no mass.      Tenderness: There is no abdominal tenderness. There is no right CVA tenderness, left CVA tenderness or guarding.   Musculoskeletal:         General: No swelling,  tenderness or deformity. Normal range of motion.      Cervical back: Normal range of motion and neck supple. No rigidity or tenderness.      Right lower leg: No edema.      Left lower leg: Edema present.   Skin:     Coloration: Skin is pale. Skin is not jaundiced.      Findings: No bruising or lesion.   Neurological:      General: No focal deficit present.      Mental Status: He is alert and oriented to person, place, and time. Mental status is at baseline.      Cranial Nerves: No cranial nerve deficit.      Sensory: No sensory deficit.      Motor: No weakness.   Psychiatric:         Mood and Affect: Mood normal.         Behavior: Behavior normal.          Results Review:    Lab Results (last 24 hours)     Procedure Component Value Units Date/Time    Troponin [[id number]]  (Normal) Collected: 12/31/21 1739    Specimen: Blood Updated: 12/31/21 1847     Troponin T <0.010 ng/mL     Narrative:      Troponin T Reference Range:  <= 0.03 ng/mL-   Negative for AMI  >0.03 ng/mL-     Abnormal for myocardial necrosis.  Clinicians would have to utilize clinical acumen, EKG, Troponin and serial changes to determine if it is an Acute Myocardial Infarction or myocardial injury due to an underlying chronic condition.       Results may be falsely decreased if patient taking Biotin.      D-dimer, Quantitative [[id number]]  (Abnormal) Collected: 12/31/21 1739    Specimen: Blood Updated: 12/31/21 1832     D-Dimer, Quantitative 740 ng/mL (FEU)     Narrative:      Dimer values <500 ng/ml FEU are FDA approved as aid in diagnosis of deep venous thrombosis and pulmonary embolism.  This test should not be used in an exclusion strategy with pretest probability alone.    A recent guideline regarding diagnosis for pulmonary thromboembolism recommends an adjusted exclusion criterion of age x 10 ng/ml FEU for patients >50 years of age ([employer] Intern Med 2015; 163: 701-711).            Imaging Results (Last 24 Hours)     ** No results found for the  last 24 hours. **          Assessment/Plan       Paroxysmal atrial fibrillation with rapid ventricular response (HCC)    Acute on chronic systolic CHF (congestive heart failure) (HCC)    Moderate malnutrition (CMS/HCC)    Continue with the IV Lasix, Metoprolol at smaller doses given the borderline blood pressure values, continue with the Amiodarone and Eliquis. Cardiology consult appreciated.    The recent Echo shows a poor LV EF of 20 to 25%. Repeat ECG in AM.    I confirmed that the patient's Advance Care Plan is present, code status is documented, or surrogate decision maker is listed in the patient's medical record.      Celso Méndez MD  01/01/22  14:15 CST

## 2022-01-01 NOTE — PLAN OF CARE
"  Problem: Adult Inpatient Plan of Care  Goal: Plan of Care Review  Outcome: Ongoing, Progressing  Flowsheets (Taken 1/1/2022 1202)  Plan of Care Reviewed With:   patient   family   Goal Outcome Evaluation:  Plan of Care Reviewed With: patient, family            Cardiac diet, intake 75% this am. # w/ BMI 27.0.  Pt states he WILL NOT follow low Na diet- has been told to take his diuretics and \"everything in moderation\" and he will be fine. Pt also reports he has decreased appetite and wt loss of 20# in past few months. He states he lives alone and just \"doenst think about eating\". Noted mild to moderate fat loss and muscle wasting to upper body. Pt with 8% wt loss w/ ? Time frame. Pt meets criteria for moderate, chronic malnutrition at this time r/t not meeting EEN d/t decreased po. Discussed nutrient dense foods, and eating multiple small meals/day. Will attach diet inforamtion re: high colton/pro foods and encouraged to keep salt use low. RD following.   "

## 2022-01-01 NOTE — CONSULTS
Cardiology at [facility]  Cardiovascular Consultation Note      Kd Campa  13/A  [id number]  1960    DATE OF ADMISSION: 12/31/2021  DATE OF CONSULTATION:  1/1/2022    Estella Gutierrez MD  Treatment Team:   Attending Provider: Celso Méndez MD  Consulting Physician: Altagracia Mantilla MD  Admitting Provider: Celso Méndez MD    Chief Complaint   Patient presents with   • Shortness of Breath   • Fluid Retention       Chief complaint/ Reason for Consultation atrial fibrillation with rapid ventricular rate and congestive heart failure due to reduced left and systolic function      History of Present Illness  61 years old patient with background history of paroxysmal atrial fibrillation s/p EP study and A. fib ablation in the [place] area more than 3 years ago and a history of congestive heart failure have some weak muscle was on Lasix and blood pressure medicine discontinued.  The patient presented for evaluation increasing shortness of breath functional class III, no  orthopnea or PND noted in atrial fibrillation with rapid ventricular rate and clinically patient was in congestive heart failure with elevated BNP mild lower extremity edema and chest x-ray bilateral pleural effusion no evidence of pulmonary embolism on CT pulmonary angiogram.  Troponin is normal.  Patient received 80 mg Lasix yesterday with a significant provement in shortness of breath and good diuresis.  He has marginal hemodynamic and we are unable to uptitrate beta-blocker and to start Aldactone or ACE/ARB at this stage.  Patient is on IV amiodarone with improvement in heart rate.  Last blood pressure systolic was 91 and echocardiogram severe LV dysfunction global hypokinesis significant elevated left atrial volume and moderate mitral and tricuspid regurgitations    Echo 1/1/2022    · The left ventricular cavity is moderately dilated.  · Estimated left ventricular EF = 22% Left ventricular ejection fraction  appears to be 21 - 25%. Left ventricular systolic function is severely decreased.  · Left ventricular diastolic dysfunction is noted.  · Left atrial volume is severely increased.  · The right atrial cavity is mildly dilated.  · Moderate mitral valve regurgitation is present.  · Moderate tricuspid valve regurgitation is present.  · There is a left pleural effusion.        proBNP   0.0 - 900.0 pg/mL 6,616.0 High   7,792.0 High       CT pulmonary angiogram    IMPRESSION:  1. No evidence for pulmonary embolus.  2. Moderate-sized bilateral pleural effusions with dependent  bibasilar atelectasis.    Past Medical History:   Diagnosis Date   • Atrial fibrillation (HCC)    • BPH (benign prostatic hyperplasia)    • Chronic bronchitis (HCC)    • Chronic systolic heart failure (HCC)    • Depression    • GERD (gastroesophageal reflux disease)        Past Surgical History:   Procedure Laterality Date   • CARDIAC ELECTROPHYSIOLOGY STUDY AND ABLATION     • CARDIOVERSION         No Known Allergies    No current facility-administered medications on file prior to encounter.     Current Outpatient Medications on File Prior to Encounter   Medication Sig Dispense Refill   • aspirin 81 MG chewable tablet Chew 325 mg Daily.     • omeprazole (priLOSEC) 40 MG capsule Take 40 mg by mouth Daily.     • tamsulosin (FLOMAX) 0.4 MG capsule 24 hr capsule Take 1 capsule by mouth Daily.     • venlafaxine (EFFEXOR) 75 MG tablet Take 75 mg by mouth 2 (Two) Times a Day.     • dilTIAZem XR (DILACOR XR) 180 MG 24 hr capsule Take 1 capsule by mouth Daily for 14 days. 14 capsule 0       Social History     Socioeconomic History   • Marital status:    Tobacco Use   • Smoking status: Current Some Day Smoker   • Smokeless tobacco: Never Used   Substance and Sexual Activity   • Alcohol use: Not Currently   • Drug use: Not Currently   • Sexual activity: Not Currently           Review of Systems:     Constitutional: Positive for fatigue. Negative for  "fever.   Respiratory: Positive for shortness of breath. Negative for cough.    Cardiovascular: Positive for leg swelling. Negative for chest pain and palpitations.   Gastrointestinal: Negative for abdominal distention, abdominal pain, blood in stool, diarrhea and vomiting.   Genitourinary: Negative for dysuria, frequency, genital sores, penile discharge, penile swelling and urgency.   Musculoskeletal: Positive for arthralgias. Negative for back pain.   Skin: Positive for pallor. Negative for color change and rash.   Neurological: Positive for weakness. Negative for seizures, syncope, light-headedness and headaches.   Psychiatric/Behavioral: Negative for agitation, behavioral problems and confusion.   All other systems reviewed and are negative.         Objective:     Vitals:    01/01/22 1030 01/01/22 1100 01/01/22 1140 01/01/22 1200   BP: (!) 84/58 110/71 91/67    BP Location:       Patient Position:       Pulse: (!) 121 (!) 121 (!) 121    Resp:       Temp:    96.9 °F (36.1 °C)   TempSrc:       SpO2: 94% 95% 98%    Weight:       Height:         Body mass index is 27.06 kg/m².  Flowsheet Rows      First Filed Value   Admission Height 190.5 cm (75\") Documented at 12/31/2021 1602   Admission Weight 87.1 kg (192 lb 0.3 oz) Documented at 12/31/2021 1559          Intake/Output Summary (Last 24 hours) at 1/1/2022 1333  Last data filed at 1/1/2022 1016  Gross per 24 hour   Intake 903.5 ml   Output 1170 ml   Net -266.5 ml         Physical Exam   Constitutional: Cooperative, alert and oriented, well developed, well nourished, in no acute distress.     HENT:   Head: Normocephalic, conjunctivae is a pink, thyroid is nonpalpable no carotid bruit and trachea central.     Cardiovascular: Tachycardia with irregular rate rhythm positive systolic murmur at the mitral valve area at length left sternal border    Pulmonary/Chest: Chest: Decreased bilateral air entry with crackles    Abdominal: Abdomen soft, bowel sounds normoactive, no " masses.    Musculoskeletal: No deformities, clubbing, cyanosis, erythema. Positive mild edema.    Neurological: No gross motor or sensory deficits noted    Skin: Warm and dry to the touch, no apparent skin lesions .     Psychiatric: Normal mood and affect. Behavior is normal.    Radiology Review    XR Chest 1 View   Final Result   1.  Left upper lobe partially calcified granuloma is again noted   as seen on recent CT chest exam of December 30, 2021. This would   be consistent with old granulomatous disease.   2.  Otherwise unremarkable chest.      Electronically signed by:  Vikas Pitt MD  12/31/2021 9:31 AM CST   Workstation: [id number]          Lab Results:  Lab Results (last 24 hours)     Procedure Component Value Units Date/Time    Troponin [[id number]]  (Normal) Collected: 12/31/21 1739    Specimen: Blood Updated: 12/31/21 1847     Troponin T <0.010 ng/mL     Narrative:      Troponin T Reference Range:  <= 0.03 ng/mL-   Negative for AMI  >0.03 ng/mL-     Abnormal for myocardial necrosis.  Clinicians would have to utilize clinical acumen, EKG, Troponin and serial changes to determine if it is an Acute Myocardial Infarction or myocardial injury due to an underlying chronic condition.       Results may be falsely decreased if patient taking Biotin.      D-dimer, Quantitative [[id number]]  (Abnormal) Collected: 12/31/21 1739    Specimen: Blood Updated: 12/31/21 1832     D-Dimer, Quantitative 740 ng/mL (FEU)     Narrative:      Dimer values <500 ng/ml FEU are FDA approved as aid in diagnosis of deep venous thrombosis and pulmonary embolism.  This test should not be used in an exclusion strategy with pretest probability alone.    A recent guideline regarding diagnosis for pulmonary thromboembolism recommends an adjusted exclusion criterion of age x 10 ng/ml FEU for patients >50 years of age ([employer] Intern Med 2015; 163: 701-711).            I personally viewed and interpreted the patient's EKG/Telemetry data        Assessment/Plan:   #1 atrial fibrillation with rapid ventricular rate of unknown duration    61 years old patient with previous history of paroxysmal atrial fibrillation s/p EP study and A. fib ablation at [place] more than 3 years ago had a history of congestive heart failure and left ventricular dysfunction not sure but ejection fraction.  Patient clinically in heart failure responded to IV diuretics had marginal hemodynamic.  We will continue IV amiodarone and also start the oral amiodarone.  We will try low-dose metoprolol but will hold ACE/ARB and Aldactone at this stage given the marginal hemodynamic    #2 congestive heart failure possible acute on chronic    She denies history of myocardial infarction in the past.  He does have history of congestive heart failure but doesn't know left ventricle systolic function.  Recent echocardiogram revealed severe left and systolic dysfunction dilated left ventricle severely elevated left atrial volume moderate mitral tricuspid regurgitations.  His hemodynamics are marginal we'll continue IV amiodarone and also start amiodarone 200 mg twice a day and start low-dose beta-blocker we'll hold ACE/ARB and Aldactone at this stage given the marginal hemodynamic I will arrange CHF evaluation  Given severe LV dysfunction he does need some ischemic evaluation timing depends on patient clinical condition and hospital course    Restrict 1000 fluid    Sodium 2 g to  Patient was counseled educated avoid nonsteroid and drink      #3 moderate mitral tricuspid regurgitations will reassess after electrical cardioversions as an outpatient    I spent 45  minutes caring for Kd on this date of service. This time includes time spent by me includes counseling/coordination of care as relates to the presenting problem and any ordered procedures/tests as outlined above.           Thank you for allowing me to participate in the care of Kd Tyrese Campa. Feel free to contact me directly  with any further questions or concerns.    Altagracia Mantilla MD  01/01/22  13:33 CST.      Part of this note may be an electronic transcription/translation of spoken language to printed text using the Dragon Dictation system.

## 2022-01-02 PROBLEM — I95.9 HYPOTENSION: Status: ACTIVE | Noted: 2022-01-02

## 2022-01-02 LAB
ANION GAP SERPL CALCULATED.3IONS-SCNC: 16 MMOL/L (ref 5–15)
ANION GAP SERPL CALCULATED.3IONS-SCNC: 9 MMOL/L (ref 5–15)
BUN SERPL-MCNC: 28 MG/DL (ref 8–23)
BUN SERPL-MCNC: 29 MG/DL (ref 8–23)
BUN/CREAT SERPL: 15.8 (ref 7–25)
BUN/CREAT SERPL: 18.1 (ref 7–25)
CALCIUM SPEC-SCNC: 9 MG/DL (ref 8.6–10.5)
CALCIUM SPEC-SCNC: 9.1 MG/DL (ref 8.6–10.5)
CHLORIDE SERPL-SCNC: 101 MMOL/L (ref 98–107)
CHLORIDE SERPL-SCNC: 104 MMOL/L (ref 98–107)
CO2 SERPL-SCNC: 22 MMOL/L (ref 22–29)
CO2 SERPL-SCNC: 26 MMOL/L (ref 22–29)
CREAT SERPL-MCNC: 1.55 MG/DL (ref 0.76–1.27)
CREAT SERPL-MCNC: 1.84 MG/DL (ref 0.76–1.27)
DEPRECATED RDW RBC AUTO: 50.7 FL (ref 37–54)
ERYTHROCYTE [DISTWIDTH] IN BLOOD BY AUTOMATED COUNT: 14.5 % (ref 12.3–15.4)
GFR SERPL CREATININE-BSD FRML MDRD: 37 ML/MIN/1.73
GFR SERPL CREATININE-BSD FRML MDRD: 46 ML/MIN/1.73
GLUCOSE SERPL-MCNC: 137 MG/DL (ref 65–99)
GLUCOSE SERPL-MCNC: 202 MG/DL (ref 65–99)
HCT VFR BLD AUTO: 47.8 % (ref 37.5–51)
HGB BLD-MCNC: 15.1 G/DL (ref 13–17.7)
MAGNESIUM SERPL-MCNC: 1.7 MG/DL (ref 1.6–2.4)
MCH RBC QN AUTO: 30.6 PG (ref 26.6–33)
MCHC RBC AUTO-ENTMCNC: 31.6 G/DL (ref 31.5–35.7)
MCV RBC AUTO: 96.8 FL (ref 79–97)
PLATELET # BLD AUTO: 206 10*3/MM3 (ref 140–450)
PMV BLD AUTO: 11.4 FL (ref 6–12)
POTASSIUM SERPL-SCNC: 3.7 MMOL/L (ref 3.5–5.2)
POTASSIUM SERPL-SCNC: 3.9 MMOL/L (ref 3.5–5.2)
QT INTERVAL: 270 MS
QT INTERVAL: 328 MS
QT INTERVAL: 344 MS
QT INTERVAL: 416 MS
QTC INTERVAL: 387 MS
QTC INTERVAL: 476 MS
QTC INTERVAL: 526 MS
QTC INTERVAL: 592 MS
RBC # BLD AUTO: 4.94 10*6/MM3 (ref 4.14–5.8)
SODIUM SERPL-SCNC: 139 MMOL/L (ref 136–145)
SODIUM SERPL-SCNC: 139 MMOL/L (ref 136–145)
TROPONIN T SERPL-MCNC: <0.01 NG/ML (ref 0–0.03)
WBC NRBC COR # BLD: 10.04 10*3/MM3 (ref 3.4–10.8)
WHOLE BLOOD HOLD SPECIMEN: NORMAL

## 2022-01-02 PROCEDURE — 25010000002 MORPHINE PER 10 MG: Performed by: INTERNAL MEDICINE

## 2022-01-02 PROCEDURE — 25010000002 DOBUTAMINE PER 250 MG: Performed by: INTERNAL MEDICINE

## 2022-01-02 PROCEDURE — 93010 ELECTROCARDIOGRAM REPORT: CPT | Performed by: INTERNAL MEDICINE

## 2022-01-02 PROCEDURE — P9047 ALBUMIN (HUMAN), 25%, 50ML: HCPCS | Performed by: INTERNAL MEDICINE

## 2022-01-02 PROCEDURE — 83735 ASSAY OF MAGNESIUM: CPT | Performed by: INTERNAL MEDICINE

## 2022-01-02 PROCEDURE — 25010000002 PROCHLORPERAZINE 10 MG/2ML SOLUTION: Performed by: FAMILY MEDICINE

## 2022-01-02 PROCEDURE — 93005 ELECTROCARDIOGRAM TRACING: CPT | Performed by: INTERNAL MEDICINE

## 2022-01-02 PROCEDURE — 25010000002 AMIODARONE IN DEXTROSE 5% 360-4.14 MG/200ML-% SOLUTION: Performed by: INTERNAL MEDICINE

## 2022-01-02 PROCEDURE — 80048 BASIC METABOLIC PNL TOTAL CA: CPT | Performed by: INTERNAL MEDICINE

## 2022-01-02 PROCEDURE — 84484 ASSAY OF TROPONIN QUANT: CPT | Performed by: INTERNAL MEDICINE

## 2022-01-02 PROCEDURE — 85027 COMPLETE CBC AUTOMATED: CPT | Performed by: INTERNAL MEDICINE

## 2022-01-02 PROCEDURE — 25010000002 FUROSEMIDE PER 20 MG: Performed by: INTERNAL MEDICINE

## 2022-01-02 PROCEDURE — 99233 SBSQ HOSP IP/OBS HIGH 50: CPT | Performed by: INTERNAL MEDICINE

## 2022-01-02 PROCEDURE — 25010000002 ALBUMIN HUMAN 25% PER 50 ML: Performed by: INTERNAL MEDICINE

## 2022-01-02 RX ORDER — FUROSEMIDE 10 MG/ML
20 INJECTION INTRAMUSCULAR; INTRAVENOUS ONCE
Status: COMPLETED | OUTPATIENT
Start: 2022-01-02 | End: 2022-01-02

## 2022-01-02 RX ORDER — ALBUMIN (HUMAN) 12.5 G/50ML
25 SOLUTION INTRAVENOUS DAILY
Status: COMPLETED | OUTPATIENT
Start: 2022-01-02 | End: 2022-01-04

## 2022-01-02 RX ORDER — DOBUTAMINE HYDROCHLORIDE 100 MG/100ML
2-20 INJECTION INTRAVENOUS
Status: DISCONTINUED | OUTPATIENT
Start: 2022-01-02 | End: 2022-01-03

## 2022-01-02 RX ADMIN — SODIUM CHLORIDE 500 ML: 9 INJECTION, SOLUTION INTRAVENOUS at 13:20

## 2022-01-02 RX ADMIN — MORPHINE SULFATE 2 MG: 2 INJECTION, SOLUTION INTRAMUSCULAR; INTRAVENOUS at 23:11

## 2022-01-02 RX ADMIN — AMIODARONE HYDROCHLORIDE 0.5 MG/MIN: 1.8 INJECTION, SOLUTION INTRAVENOUS at 13:10

## 2022-01-02 RX ADMIN — VENLAFAXINE 75 MG: 75 TABLET ORAL at 20:46

## 2022-01-02 RX ADMIN — ALBUMIN HUMAN 25 G: 0.25 SOLUTION INTRAVENOUS at 13:44

## 2022-01-02 RX ADMIN — AMIODARONE HYDROCHLORIDE 200 MG: 200 TABLET ORAL at 20:46

## 2022-01-02 RX ADMIN — SODIUM CHLORIDE, PRESERVATIVE FREE 10 ML: 5 INJECTION INTRAVENOUS at 13:03

## 2022-01-02 RX ADMIN — VENLAFAXINE 75 MG: 75 TABLET ORAL at 11:04

## 2022-01-02 RX ADMIN — APIXABAN 5 MG: 5 TABLET, FILM COATED ORAL at 20:46

## 2022-01-02 RX ADMIN — DOBUTAMINE HYDROCHLORIDE 2 MCG/KG/MIN: 100 INJECTION INTRAVENOUS at 13:45

## 2022-01-02 RX ADMIN — PROCHLORPERAZINE EDISYLATE 10 MG: 5 INJECTION INTRAMUSCULAR; INTRAVENOUS at 19:39

## 2022-01-02 RX ADMIN — FUROSEMIDE 40 MG: 10 INJECTION, SOLUTION INTRAVENOUS at 06:03

## 2022-01-02 RX ADMIN — PROCHLORPERAZINE EDISYLATE 10 MG: 5 INJECTION INTRAMUSCULAR; INTRAVENOUS at 13:03

## 2022-01-02 RX ADMIN — METOPROLOL TARTRATE 12.5 MG: 25 TABLET, FILM COATED ORAL at 11:04

## 2022-01-02 RX ADMIN — SODIUM CHLORIDE, PRESERVATIVE FREE 10 ML: 5 INJECTION INTRAVENOUS at 08:44

## 2022-01-02 RX ADMIN — PANTOPRAZOLE SODIUM 40 MG: 40 INJECTION, POWDER, FOR SOLUTION INTRAVENOUS at 06:04

## 2022-01-02 RX ADMIN — FUROSEMIDE 20 MG: 10 INJECTION, SOLUTION INTRAMUSCULAR; INTRAVENOUS at 14:48

## 2022-01-02 RX ADMIN — SODIUM CHLORIDE, PRESERVATIVE FREE 10 ML: 5 INJECTION INTRAVENOUS at 20:47

## 2022-01-02 RX ADMIN — TAMSULOSIN HYDROCHLORIDE 0.4 MG: 0.4 CAPSULE ORAL at 20:46

## 2022-01-02 RX ADMIN — APIXABAN 5 MG: 5 TABLET, FILM COATED ORAL at 11:04

## 2022-01-02 NOTE — PROGRESS NOTES
LOS: 2 days   Patient Care Team:  Estella Gutierrez MD as PCP - General (Family Medicine)    Chief Complaint:  atrial fibrillation with rapid ventricular rate and congestive heart failure due to reduced left ventricle systolic function  61 years old patient with background history of paroxysmal atrial fibrillation s/p EP study and A. fib ablation in the [place] area more than 3 years ago and a history of congestive heart failure have some weak muscle was on Lasix and blood pressure medicine discontinued.  The patient presented for evaluation increasing shortness of breath functional class III no limited orthopnea or PND noted in atrial fibrillation with rapid ventricular rate and clinically patient was in congestive heart failure with elevated BNP mild lower extremity edema and chest x-ray bilateral pleural effusion no evidence of pulmonary embolism on CT pulmonary angiogram.  Troponin is normal.  Patient received 80 mg Lasix yesterday with a significant provement in shortness of breath and good diuresis.  He has marginal hemodynamic and we are unable to uptitrate beta-blocker and to start Aldactone or ACE/ARB at this stage.      Review of Systems:     Constitution:  Denies any fatigue, fever or chills.  Positive for activity change positive for fatigue    HENT:  Denies any headache, hearing impairment.    Eyes:  Denies any blurring of vision     Cardiovascular:  As per history of present illness.     Respiratory: Exertional dyspnea functional class III       Gastrointestinal:  No nausea, vomiting, or melena.    Extremity: No edema, positive pulses    Objective     Current Facility-Administered Medications   Medication Dose Route Frequency Provider Last Rate Last Admin   • amiodarone (PACERONE) tablet 200 mg  200 mg Oral Q12H Celso Méndez MD   200 mg at 01/01/22 2151   • amiodarone 360 mg in 200 mL D5W infusion  0.5 mg/min Intravenous Continuous Altagracia Mantilla MD 16.67 mL/hr at 01/01/22 2349 0.5 mg/min at  "01/01/22 2349   • apixaban (ELIQUIS) tablet 5 mg  5 mg Oral Q12H Celso Méndez MD   5 mg at 01/02/22 1104   • furosemide (LASIX) injection 40 mg  40 mg Intravenous Q12H Celso Méndez MD   40 mg at 01/02/22 0603   • metoprolol tartrate (LOPRESSOR) half tablet 12.5 mg  12.5 mg Oral Q12H Celso Méndez MD   12.5 mg at 01/02/22 1104   • morphine injection 2 mg  2 mg Intravenous Q2H PRN Celso Méndez MD   2 mg at 01/01/22 2110   • ondansetron (ZOFRAN) injection 4 mg  4 mg Intravenous Q6H PRN Celso Méndez MD   4 mg at 01/01/22 2110   • pantoprazole (PROTONIX) injection 40 mg  40 mg Intravenous Q AM Celso Méndez MD   40 mg at 01/02/22 0604   • prochlorperazine (COMPAZINE) injection 10 mg  10 mg Intravenous Q4H PRN Arik Haines MD   10 mg at 01/02/22 1303    Or   • prochlorperazine (COMPAZINE) tablet 5 mg  5 mg Oral Q6H PRN Arik Haines MD        Or   • prochlorperazine (COMPAZINE) suppository 25 mg  25 mg Rectal Q12H PRN Arik Haines MD       • sodium chloride 0.9 % flush 10 mL  10 mL Intravenous PRN Celso Méndez MD       • sodium chloride 0.9 % flush 10 mL  10 mL Intravenous Q12H Celso Méndez MD   10 mL at 01/02/22 0844   • sodium chloride 0.9 % flush 10 mL  10 mL Intravenous PRN Celso Méndez MD   10 mL at 01/02/22 1303   • tamsulosin (FLOMAX) 24 hr capsule 0.4 mg  0.4 mg Oral Nightly Celso Méndez MD   0.4 mg at 12/31/21 2003   • venlafaxine (EFFEXOR) tablet 75 mg  75 mg Oral BID Celso Méndez MD   75 mg at 01/02/22 1104       Vital Sign Min/Max for last 24 hours  Temp  Min: 97.5 °F (36.4 °C)  Max: 97.8 °F (36.6 °C)   BP  Min: 89/62  Max: 135/69   Pulse  Min: 116  Max: 124   Resp  Min: 23  Max: 23   SpO2  Min: 91 %  Max: 99 %   Flow (L/min)  Min: 2  Max: 2   Weight  Min: 99.3 kg (218 lb 14.7 oz)  Max: 99.3 kg (218 lb 14.7 oz)     Flowsheet Rows      First Filed Value   Admission Height 190.5 cm (75\") Documented at 12/31/2021 1602   Admission Weight 87.1 kg (192 lb 0.3 oz) " Documented at 12/31/2021 1559            Intake/Output Summary (Last 24 hours) at 1/2/2022 1307  Last data filed at 1/2/2022 0839  Gross per 24 hour   Intake 1000.71 ml   Output 875 ml   Net 125.71 ml       Physical Exam:  Neck: Supple.  No JVD, no thyroid enlargement.  Chest: Air entry equal, normal respiration.  No rhonchi or creps.  Cardiovascular system:  Regular rate and rhythm, no murmurs.  Abdomen: Soft, no tenderness, bowel sounds present, no hepatosplenomegaly.  CNS: Alert, oriented to place and time.  No motor or sensory deficit.  Cranial nerves intact.  Musculoskeletal: No deformity of the back or spine.  Extremities:  No edema.  Pulses equal on both sides.     Results Review:   I reviewed the patient's new clinical results.  Lab Results   Component Value Date    WBC 7.43 12/31/2021    HGB 15.1 12/31/2021    HCT 44.9 12/31/2021    MCV 94.9 12/31/2021     12/31/2021     Lab Results   Component Value Date    GLUCOSE 137 (H) 01/02/2022    BUN 28 (H) 01/02/2022    CREATININE 1.55 (H) 01/02/2022    EGFRIFNONA 46 (L) 01/02/2022    BCR 18.1 01/02/2022    CO2 26.0 01/02/2022    CALCIUM 9.0 01/02/2022    ALBUMIN 3.80 12/31/2021    AST 23 12/31/2021    ALT 16 12/31/2021     No results found for: TSH  No results found for: INR, PROTIME  No results found for: PTT    EKG:     Telemetry:    Ejection Fraction  No results found for: EF    Echo EF Estimated  Lab Results   Component Value Date    ECHOEFEST 22 12/31/2021         Present on Admission:  • Paroxysmal atrial fibrillation with rapid ventricular response (HCC)  • Acute on chronic systolic CHF (congestive heart failure) (HCC)  • Moderate malnutrition (CMS/HCC)    Assessment/Plan   #1 atrial fibrillation with rapid ventricular rate of unknown duration     61 years old patient with previous history of paroxysmal atrial fibrillation s/p EP study and A. fib ablation at [place] more than 3 years ago had a history of congestive heart failure and left  ventricular dysfunction patient mated atrial fibrillation/flutter rate is better today we will continue amiodarone.  Transesophageal guided electrical cardioversion discussed with the patient.  Procedure risk explained.  Risk included but not limited placing transesophageal echocardiogram probe, arrhythmia stroke.  Understand willing to proceed forward.  We will keep n.p.o. after midnight.  Continue Eliquis     #2 congestive heart failure possible acute on chronic functional class III structural stage C     She denies history of myocardial infarction in the past.  He does have history of congestive heart failure but doesn't know left ventricle systolic function.  Recent echocardiogram revealed severe left and systolic dysfunction dilated left ventricle severely elevated left atrial volume moderate mitral tricuspid regurgitations.  His hemodynamics are marginal we'll continue IV amiodarone and also start amiodarone 200 mg twice a day and start low-dose beta-blocker we'll hold ACE/ARB and Aldactone at this stage given the marginal hemodynamic I will arrange CHF evaluation  Given severe LV dysfunction he does need some ischemic evaluation timing depends on patient clinical condition and hospital course     Restrict 1000 fluid     Sodium 2 g to  Patient was counseled educated avoid nonsteroid and drink        #3 moderate mitral tricuspid regurgitations will reassess after electrical cardioversions as an outpatient    Altagracia Mantlila MD  01/02/22  13:07 CST      Part of this note may be an electronic transcription/translation of spoken language to printed text using the Dragon Dictation system.

## 2022-01-02 NOTE — PLAN OF CARE
Goal Outcome Evaluation:  Plan of Care Reviewed With: patient        Progress: no change  Outcome Summary: Pt continues on Amio gtt at 16.67 per md order and was NPO after midnight in prep for cathlab procedure.  Zofran ineffective for emesis x2 with nausea requiring compazine to tx. Pt nausea subsided. Will continue to monitor

## 2022-01-02 NOTE — PROGRESS NOTES
Progress Note  Celso Méndez MD  Hospitalist    Date of visit: 1/2/2022     LOS: 2 days   Patient Care Team:  Estella Gutierrez MD as PCP - General (Family Medicine)    Chief Complaint: palpitations    Subjective     Interval History:     Patient Complaints: palpitations due to uncontrolled atrial fibrillation - remains in atrial fibrillation    History taken from: patient / nursing    Medication Review:   Current Facility-Administered Medications   Medication Dose Route Frequency Provider Last Rate Last Admin   • albumin human 25 % IV SOLN 25 g  25 g Intravenous Daily Celso Méndez MD       • amiodarone (PACERONE) tablet 200 mg  200 mg Oral Q12H Celso Méndez MD   200 mg at 01/01/22 2151   • amiodarone 360 mg in 200 mL D5W infusion  0.5 mg/min Intravenous Continuous Altagracia Mantilla MD 16.67 mL/hr at 01/02/22 1310 0.5 mg/min at 01/02/22 1310   • apixaban (ELIQUIS) tablet 5 mg  5 mg Oral Q12H Celso Méndez MD   5 mg at 01/02/22 1104   • DOBUTamine (DOBUTREX) 1 mg/mL infusion  2-20 mcg/kg/min Intravenous Titrated Celso Méndez MD       • morphine injection 2 mg  2 mg Intravenous Q2H PRN Celso Méndez MD   2 mg at 01/01/22 2110   • ondansetron (ZOFRAN) injection 4 mg  4 mg Intravenous Q6H PRN Celso Méndez MD   4 mg at 01/01/22 2110   • pantoprazole (PROTONIX) injection 40 mg  40 mg Intravenous Q AM Celso Méndez MD   40 mg at 01/02/22 0604   • prochlorperazine (COMPAZINE) injection 10 mg  10 mg Intravenous Q4H PRN Arik Haines MD   10 mg at 01/02/22 1303    Or   • prochlorperazine (COMPAZINE) tablet 5 mg  5 mg Oral Q6H PRN Arik Haines MD        Or   • prochlorperazine (COMPAZINE) suppository 25 mg  25 mg Rectal Q12H PRN Arik Haines MD       • sodium chloride 0.9 % bolus 500 mL  500 mL Intravenous Once Celso Méndez MD 1,000 mL/hr at 01/02/22 1320 500 mL at 01/02/22 1320   • sodium chloride 0.9 % flush 10 mL  10 mL Intravenous PRN Celso Méndez MD       • sodium chloride 0.9 %  flush 10 mL  10 mL Intravenous Q12H Celso Méndez MD   10 mL at 01/02/22 0844   • sodium chloride 0.9 % flush 10 mL  10 mL Intravenous PRN Celso Méndez MD   10 mL at 01/02/22 1303   • tamsulosin (FLOMAX) 24 hr capsule 0.4 mg  0.4 mg Oral Nightly Celso Méndez MD   0.4 mg at 12/31/21 2003   • venlafaxine (EFFEXOR) tablet 75 mg  75 mg Oral BID Celso Méndez MD   75 mg at 01/02/22 1104       Review of Systems:   Review of Systems   Constitutional: Positive for fatigue. Negative for fever.   Respiratory: Positive for shortness of breath. Negative for cough and wheezing.    Cardiovascular: Positive for palpitations. Negative for chest pain and leg swelling.   Gastrointestinal: Negative for abdominal distention, anal bleeding, constipation, diarrhea, nausea and vomiting.   Genitourinary: Negative for flank pain, frequency, hematuria, penile pain and urgency.   Musculoskeletal: Positive for arthralgias.   Skin: Positive for pallor. Negative for color change and rash.   Neurological: Positive for weakness. Negative for syncope, speech difficulty and headaches.   Psychiatric/Behavioral: Negative for agitation, behavioral problems and confusion.   All other systems reviewed and are negative.      Objective     Vital Signs  Temp:  [97.5 °F (36.4 °C)-97.8 °F (36.6 °C)] 97.5 °F (36.4 °C)  Heart Rate:  [115-124] 117  Resp:  [23] 23  BP: ()/(53-90) 108/76    Physical Exam:  Physical Exam  Vitals reviewed.   Constitutional:       General: He is not in acute distress.     Appearance: He is ill-appearing.   HENT:      Head: Normocephalic and atraumatic.   Eyes:      General: No scleral icterus.     Extraocular Movements: Extraocular movements intact.      Pupils: Pupils are equal, round, and reactive to light.   Cardiovascular:      Rate and Rhythm: Tachycardia present. Rhythm irregular.   Pulmonary:      Effort: Pulmonary effort is normal. No respiratory distress.      Breath sounds: No stridor. No wheezing or rales.    Chest:      Chest wall: No tenderness.   Abdominal:      General: Bowel sounds are normal. There is no distension.      Palpations: Abdomen is soft. There is no mass.      Tenderness: There is no abdominal tenderness. There is no right CVA tenderness, left CVA tenderness or guarding.   Musculoskeletal:         General: No swelling, tenderness or deformity. Normal range of motion.      Cervical back: Normal range of motion and neck supple. No rigidity or tenderness.      Right lower leg: No edema.      Left lower leg: Edema present.   Skin:     Coloration: Skin is pale. Skin is not jaundiced.      Findings: No bruising or lesion.   Neurological:      General: No focal deficit present.      Mental Status: He is alert and oriented to person, place, and time. Mental status is at baseline.      Cranial Nerves: No cranial nerve deficit.      Sensory: No sensory deficit.      Motor: No weakness.   Psychiatric:         Mood and Affect: Mood normal.         Behavior: Behavior normal.          Results Review:    Lab Results (last 24 hours)     Procedure Component Value Units Date/Time    Extra Tubes [[id number]] Collected: 01/02/22 0334    Specimen: Blood, Venous Line Updated: 01/02/22 0445    Narrative:      The following orders were created for panel order Extra Tubes.  Procedure                               Abnormality         Status                     ---------                               -----------         ------                     Lavender Top[[id number]]                                     Final result                 Please view results for these tests on the individual orders.    Lavender Top [[id number]] Collected: 01/02/22 0334    Specimen: Blood Updated: 01/02/22 0445     Extra Tube hold for add-on     Comment: Auto resulted       Basic Metabolic Panel [[id number]]  (Abnormal) Collected: 01/02/22 0313    Specimen: Blood Updated: 01/02/22 0440     Glucose 137 mg/dL      BUN 28 mg/dL      Creatinine 1.55 mg/dL       Sodium 139 mmol/L      Potassium 3.9 mmol/L      Chloride 104 mmol/L      CO2 26.0 mmol/L      Calcium 9.0 mg/dL      eGFR Non African Amer 46 mL/min/1.73      BUN/Creatinine Ratio 18.1     Anion Gap 9.0 mmol/L     Narrative:      GFR Normal >60  Chronic Kidney Disease <60  Kidney Failure <15      Magnesium [[id number]]  (Normal) Collected: 01/02/22 0313    Specimen: Blood Updated: 01/02/22 0440     Magnesium 1.7 mg/dL           Imaging Results (Last 24 Hours)     ** No results found for the last 24 hours. **          Assessment/Plan       Paroxysmal atrial fibrillation with rapid ventricular response (HCC)    Acute on chronic systolic CHF (congestive heart failure) (HCC)    Hypotension    Moderate malnutrition (CMS/HCC)    Continue with the IV Lasix, Metoprolol at smaller doses given the borderline blood pressure values, continue with the Amiodarone and Eliquis. Cardiology consult greatly appreciated.    Stop the IV Lasix / oral Lopressor given the degree of hypotension - start IV Dobutamine / IV Albumin.    The recent Echo shows a poor LV EF of 20 to 25%.    I confirmed that the patient's Advance Care Plan is present, code status is documented, or surrogate decision maker is listed in the patient's medical record.      Celso Méndez MD  01/02/22  13:34 CST

## 2022-01-03 ENCOUNTER — APPOINTMENT (OUTPATIENT)
Dept: CARDIOLOGY | Facility: HOSPITAL | Age: 62
End: 2022-01-03

## 2022-01-03 ENCOUNTER — ANESTHESIA (OUTPATIENT)
Dept: CARDIOLOGY | Facility: HOSPITAL | Age: 62
End: 2022-01-03

## 2022-01-03 ENCOUNTER — ANESTHESIA EVENT (OUTPATIENT)
Dept: CARDIOLOGY | Facility: HOSPITAL | Age: 62
End: 2022-01-03

## 2022-01-03 LAB
ANION GAP SERPL CALCULATED.3IONS-SCNC: 13 MMOL/L (ref 5–15)
BH CV ECHO MEAS - AO ROOT AREA (BSA CORRECTED): 1.6
BH CV ECHO MEAS - AO ROOT AREA: 10.2 CM^2
BH CV ECHO MEAS - AO ROOT DIAM: 3.6 CM
BH CV ECHO MEAS - BSA(HAYCOCK): 2.3 M^2
BH CV ECHO MEAS - BSA: 2.3 M^2
BH CV ECHO MEAS - BZI_BMI: 26.9 KILOGRAMS/M^2
BH CV ECHO MEAS - BZI_METRIC_HEIGHT: 190.5 CM
BH CV ECHO MEAS - BZI_METRIC_WEIGHT: 97.5 KG
BH CV ECHO MEAS - LA DIMENSION: 5.7 CM
BH CV ECHO MEAS - LA/AO: 1.6
BUN SERPL-MCNC: 30 MG/DL (ref 8–23)
BUN/CREAT SERPL: 18.8 (ref 7–25)
CALCIUM SPEC-SCNC: 8.9 MG/DL (ref 8.6–10.5)
CHLORIDE SERPL-SCNC: 101 MMOL/L (ref 98–107)
CO2 SERPL-SCNC: 26 MMOL/L (ref 22–29)
CREAT SERPL-MCNC: 1.6 MG/DL (ref 0.76–1.27)
GFR SERPL CREATININE-BSD FRML MDRD: 44 ML/MIN/1.73
GLUCOSE SERPL-MCNC: 103 MG/DL (ref 65–99)
INR PPP: 1.79 (ref 0.8–1.2)
LV EF 2D ECHO EST: 22 %
MAXIMAL PREDICTED HEART RATE: 157 BPM
MAXIMAL PREDICTED HEART RATE: 157 BPM
POTASSIUM SERPL-SCNC: 3.5 MMOL/L (ref 3.5–5.2)
PROTHROMBIN TIME: 20.4 SECONDS (ref 11.1–15.3)
QT INTERVAL: 332 MS
QT INTERVAL: 514 MS
QTC INTERVAL: 465 MS
QTC INTERVAL: 558 MS
SODIUM SERPL-SCNC: 140 MMOL/L (ref 136–145)
STRESS TARGET HR: 133 BPM
STRESS TARGET HR: 133 BPM

## 2022-01-03 PROCEDURE — 25010000002 METOCLOPRAMIDE PER 10 MG

## 2022-01-03 PROCEDURE — 25010000002 PROCHLORPERAZINE 10 MG/2ML SOLUTION: Performed by: FAMILY MEDICINE

## 2022-01-03 PROCEDURE — 93325 DOPPLER ECHO COLOR FLOW MAPG: CPT | Performed by: INTERNAL MEDICINE

## 2022-01-03 PROCEDURE — 25010000002 ONDANSETRON PER 1 MG: Performed by: INTERNAL MEDICINE

## 2022-01-03 PROCEDURE — 25010000002 MIDAZOLAM PER 1 MG: Performed by: NURSE ANESTHETIST, CERTIFIED REGISTERED

## 2022-01-03 PROCEDURE — 93005 ELECTROCARDIOGRAM TRACING: CPT | Performed by: INTERNAL MEDICINE

## 2022-01-03 PROCEDURE — 80048 BASIC METABOLIC PNL TOTAL CA: CPT | Performed by: INTERNAL MEDICINE

## 2022-01-03 PROCEDURE — 93321 DOPPLER ECHO F-UP/LMTD STD: CPT

## 2022-01-03 PROCEDURE — 92960 CARDIOVERSION ELECTRIC EXT: CPT | Performed by: INTERNAL MEDICINE

## 2022-01-03 PROCEDURE — 25010000002 PROPOFOL 10 MG/ML EMULSION: Performed by: NURSE ANESTHETIST, CERTIFIED REGISTERED

## 2022-01-03 PROCEDURE — 85610 PROTHROMBIN TIME: CPT | Performed by: INTERNAL MEDICINE

## 2022-01-03 PROCEDURE — 93312 ECHO TRANSESOPHAGEAL: CPT

## 2022-01-03 PROCEDURE — 93321 DOPPLER ECHO F-UP/LMTD STD: CPT | Performed by: INTERNAL MEDICINE

## 2022-01-03 PROCEDURE — 93312 ECHO TRANSESOPHAGEAL: CPT | Performed by: INTERNAL MEDICINE

## 2022-01-03 PROCEDURE — 25010000002 FUROSEMIDE PER 20 MG

## 2022-01-03 PROCEDURE — 25010000002 MORPHINE PER 10 MG: Performed by: INTERNAL MEDICINE

## 2022-01-03 PROCEDURE — 25010000002 HEPARIN (PORCINE) PER 1000 UNITS: Performed by: INTERNAL MEDICINE

## 2022-01-03 PROCEDURE — 5A2204Z RESTORATION OF CARDIAC RHYTHM, SINGLE: ICD-10-PCS | Performed by: INTERNAL MEDICINE

## 2022-01-03 PROCEDURE — 92960 CARDIOVERSION ELECTRIC EXT: CPT

## 2022-01-03 PROCEDURE — 25010000002 AMIODARONE IN DEXTROSE 5% 360-4.14 MG/200ML-% SOLUTION: Performed by: INTERNAL MEDICINE

## 2022-01-03 PROCEDURE — 93010 ELECTROCARDIOGRAM REPORT: CPT | Performed by: INTERNAL MEDICINE

## 2022-01-03 PROCEDURE — P9047 ALBUMIN (HUMAN), 25%, 50ML: HCPCS | Performed by: INTERNAL MEDICINE

## 2022-01-03 PROCEDURE — 25010000002 ALBUMIN HUMAN 25% PER 50 ML: Performed by: INTERNAL MEDICINE

## 2022-01-03 PROCEDURE — 25010000002 ALBUMIN HUMAN 25% PER 50 ML: Performed by: FAMILY MEDICINE

## 2022-01-03 PROCEDURE — P9047 ALBUMIN (HUMAN), 25%, 50ML: HCPCS | Performed by: FAMILY MEDICINE

## 2022-01-03 PROCEDURE — 93325 DOPPLER ECHO COLOR FLOW MAPG: CPT

## 2022-01-03 RX ORDER — PROPOFOL 10 MG/ML
VIAL (ML) INTRAVENOUS AS NEEDED
Status: DISCONTINUED | OUTPATIENT
Start: 2022-01-03 | End: 2022-01-03 | Stop reason: SURG

## 2022-01-03 RX ORDER — SODIUM CHLORIDE 9 MG/ML
INJECTION, SOLUTION INTRAVENOUS CONTINUOUS PRN
Status: DISCONTINUED | OUTPATIENT
Start: 2022-01-03 | End: 2022-01-03 | Stop reason: SURG

## 2022-01-03 RX ORDER — FUROSEMIDE 20 MG/1
20 TABLET ORAL DAILY
Status: DISCONTINUED | OUTPATIENT
Start: 2022-01-03 | End: 2022-01-04

## 2022-01-03 RX ORDER — ALPRAZOLAM 0.5 MG/1
0.5 TABLET ORAL 3 TIMES DAILY PRN
Status: DISCONTINUED | OUTPATIENT
Start: 2022-01-03 | End: 2022-01-08 | Stop reason: HOSPADM

## 2022-01-03 RX ORDER — MAGNESIUM SULFATE HEPTAHYDRATE 40 MG/ML
4 INJECTION, SOLUTION INTRAVENOUS AS NEEDED
Status: DISCONTINUED | OUTPATIENT
Start: 2022-01-03 | End: 2022-01-08 | Stop reason: HOSPADM

## 2022-01-03 RX ORDER — MIDAZOLAM HYDROCHLORIDE 1 MG/ML
INJECTION INTRAMUSCULAR; INTRAVENOUS AS NEEDED
Status: DISCONTINUED | OUTPATIENT
Start: 2022-01-03 | End: 2022-01-03 | Stop reason: SURG

## 2022-01-03 RX ORDER — FUROSEMIDE 20 MG/1
20 TABLET ORAL ONCE
Status: DISCONTINUED | OUTPATIENT
Start: 2022-01-03 | End: 2022-01-04

## 2022-01-03 RX ORDER — ALBUMIN (HUMAN) 12.5 G/50ML
25 SOLUTION INTRAVENOUS ONCE
Status: COMPLETED | OUTPATIENT
Start: 2022-01-03 | End: 2022-01-03

## 2022-01-03 RX ORDER — FUROSEMIDE 10 MG/ML
20 INJECTION INTRAMUSCULAR; INTRAVENOUS ONCE
Status: COMPLETED | OUTPATIENT
Start: 2022-01-03 | End: 2022-01-03

## 2022-01-03 RX ORDER — METOCLOPRAMIDE HYDROCHLORIDE 5 MG/ML
10 INJECTION INTRAMUSCULAR; INTRAVENOUS ONCE
Status: COMPLETED | OUTPATIENT
Start: 2022-01-03 | End: 2022-01-03

## 2022-01-03 RX ORDER — POTASSIUM CHLORIDE 1.5 G/1.77G
40 POWDER, FOR SOLUTION ORAL AS NEEDED
Status: DISCONTINUED | OUTPATIENT
Start: 2022-01-03 | End: 2022-01-08 | Stop reason: HOSPADM

## 2022-01-03 RX ORDER — POTASSIUM CHLORIDE 750 MG/1
40 CAPSULE, EXTENDED RELEASE ORAL AS NEEDED
Status: DISCONTINUED | OUTPATIENT
Start: 2022-01-03 | End: 2022-01-08 | Stop reason: HOSPADM

## 2022-01-03 RX ORDER — HEPARIN SODIUM 1000 [USP'U]/ML
INJECTION, SOLUTION INTRAVENOUS; SUBCUTANEOUS
Status: COMPLETED | OUTPATIENT
Start: 2022-01-03 | End: 2022-01-03

## 2022-01-03 RX ORDER — METOPROLOL SUCCINATE 25 MG/1
25 TABLET, EXTENDED RELEASE ORAL
Status: DISCONTINUED | OUTPATIENT
Start: 2022-01-03 | End: 2022-01-08 | Stop reason: HOSPADM

## 2022-01-03 RX ORDER — MAGNESIUM SULFATE HEPTAHYDRATE 40 MG/ML
2 INJECTION, SOLUTION INTRAVENOUS AS NEEDED
Status: DISCONTINUED | OUTPATIENT
Start: 2022-01-03 | End: 2022-01-08 | Stop reason: HOSPADM

## 2022-01-03 RX ORDER — LOSARTAN POTASSIUM 25 MG/1
25 TABLET ORAL
Status: DISCONTINUED | OUTPATIENT
Start: 2022-01-03 | End: 2022-01-05

## 2022-01-03 RX ORDER — FUROSEMIDE 10 MG/ML
INJECTION INTRAMUSCULAR; INTRAVENOUS
Status: COMPLETED
Start: 2022-01-03 | End: 2022-01-03

## 2022-01-03 RX ADMIN — AMIODARONE HYDROCHLORIDE 0.5 MG/MIN: 1.8 INJECTION, SOLUTION INTRAVENOUS at 15:21

## 2022-01-03 RX ADMIN — LOSARTAN POTASSIUM 25 MG: 25 TABLET, FILM COATED ORAL at 13:36

## 2022-01-03 RX ADMIN — VENLAFAXINE 75 MG: 75 TABLET ORAL at 10:12

## 2022-01-03 RX ADMIN — SODIUM CHLORIDE, PRESERVATIVE FREE 10 ML: 5 INJECTION INTRAVENOUS at 10:13

## 2022-01-03 RX ADMIN — ALPRAZOLAM 0.5 MG: 0.5 TABLET ORAL at 23:35

## 2022-01-03 RX ADMIN — HEPARIN SODIUM 5000 UNITS: 1000 INJECTION INTRAVENOUS; SUBCUTANEOUS at 09:29

## 2022-01-03 RX ADMIN — AMIODARONE HYDROCHLORIDE 0.5 MG/MIN: 1.8 INJECTION, SOLUTION INTRAVENOUS at 02:34

## 2022-01-03 RX ADMIN — SODIUM CHLORIDE, PRESERVATIVE FREE 10 ML: 5 INJECTION INTRAVENOUS at 18:05

## 2022-01-03 RX ADMIN — SODIUM CHLORIDE, PRESERVATIVE FREE 10 ML: 5 INJECTION INTRAVENOUS at 20:45

## 2022-01-03 RX ADMIN — POTASSIUM CHLORIDE 40 MEQ: 750 CAPSULE, EXTENDED RELEASE ORAL at 17:39

## 2022-01-03 RX ADMIN — FUROSEMIDE 20 MG: 10 INJECTION, SOLUTION INTRAMUSCULAR; INTRAVENOUS at 21:04

## 2022-01-03 RX ADMIN — MORPHINE SULFATE 2 MG: 2 INJECTION, SOLUTION INTRAMUSCULAR; INTRAVENOUS at 22:25

## 2022-01-03 RX ADMIN — PROPOFOL 30 MG: 10 INJECTION, EMULSION INTRAVENOUS at 09:17

## 2022-01-03 RX ADMIN — MIDAZOLAM HYDROCHLORIDE 2 MG: 1 INJECTION, SOLUTION INTRAMUSCULAR; INTRAVENOUS at 09:13

## 2022-01-03 RX ADMIN — AMIODARONE HYDROCHLORIDE 200 MG: 200 TABLET ORAL at 10:11

## 2022-01-03 RX ADMIN — APIXABAN 5 MG: 5 TABLET, FILM COATED ORAL at 10:12

## 2022-01-03 RX ADMIN — SODIUM CHLORIDE: 9 INJECTION, SOLUTION INTRAVENOUS at 09:06

## 2022-01-03 RX ADMIN — ONDANSETRON 4 MG: 2 INJECTION INTRAMUSCULAR; INTRAVENOUS at 20:00

## 2022-01-03 RX ADMIN — Medication 12.5 MG: at 13:27

## 2022-01-03 RX ADMIN — ALBUMIN HUMAN 25 G: 0.25 SOLUTION INTRAVENOUS at 13:27

## 2022-01-03 RX ADMIN — VENLAFAXINE 75 MG: 75 TABLET ORAL at 20:46

## 2022-01-03 RX ADMIN — FUROSEMIDE 20 MG: 10 INJECTION INTRAMUSCULAR; INTRAVENOUS at 21:04

## 2022-01-03 RX ADMIN — PROPOFOL 20 MG: 10 INJECTION, EMULSION INTRAVENOUS at 09:24

## 2022-01-03 RX ADMIN — APIXABAN 5 MG: 5 TABLET, FILM COATED ORAL at 20:48

## 2022-01-03 RX ADMIN — PROCHLORPERAZINE EDISYLATE 10 MG: 5 INJECTION INTRAMUSCULAR; INTRAVENOUS at 18:05

## 2022-01-03 RX ADMIN — PROPOFOL 30 MG: 10 INJECTION, EMULSION INTRAVENOUS at 09:16

## 2022-01-03 RX ADMIN — ALBUMIN HUMAN 25 G: 0.25 SOLUTION INTRAVENOUS at 19:39

## 2022-01-03 RX ADMIN — AMIODARONE HYDROCHLORIDE 200 MG: 200 TABLET ORAL at 20:46

## 2022-01-03 RX ADMIN — METOCLOPRAMIDE 10 MG: 5 INJECTION, SOLUTION INTRAMUSCULAR; INTRAVENOUS at 21:05

## 2022-01-03 RX ADMIN — PANTOPRAZOLE SODIUM 40 MG: 40 INJECTION, POWDER, FOR SOLUTION INTRAVENOUS at 05:49

## 2022-01-03 RX ADMIN — PROPOFOL 40 MG: 10 INJECTION, EMULSION INTRAVENOUS at 09:35

## 2022-01-03 RX ADMIN — TAMSULOSIN HYDROCHLORIDE 0.4 MG: 0.4 CAPSULE ORAL at 20:46

## 2022-01-03 RX ADMIN — POTASSIUM CHLORIDE 40 MEQ: 750 CAPSULE, EXTENDED RELEASE ORAL at 20:46

## 2022-01-03 RX ADMIN — MORPHINE SULFATE 2 MG: 2 INJECTION, SOLUTION INTRAMUSCULAR; INTRAVENOUS at 20:00

## 2022-01-03 RX ADMIN — PROPOFOL 50 MG: 10 INJECTION, EMULSION INTRAVENOUS at 09:15

## 2022-01-03 RX ADMIN — METOPROLOL SUCCINATE 25 MG: 25 TABLET, FILM COATED, EXTENDED RELEASE ORAL at 13:36

## 2022-01-03 RX ADMIN — PROCHLORPERAZINE EDISYLATE 10 MG: 5 INJECTION INTRAMUSCULAR; INTRAVENOUS at 22:02

## 2022-01-03 RX ADMIN — PROPOFOL 20 MG: 10 INJECTION, EMULSION INTRAVENOUS at 09:27

## 2022-01-03 RX ADMIN — FUROSEMIDE 20 MG: 20 TABLET ORAL at 13:36

## 2022-01-03 NOTE — ANESTHESIA PREPROCEDURE EVALUATION
Anesthesia Evaluation     no history of anesthetic complications:  NPO Solid Status: > 8 hours  NPO Liquid Status: > 8 hours           Airway   Mallampati: II  TM distance: >3 FB  Neck ROM: full  Possible difficult intubation  Dental    (+) poor dentition        Pulmonary     breath sounds clear to auscultation  (+) sleep apnea, decreased breath sounds,   (-) COPD, asthma, not a smoker    ROS comment: Cough    1.  Left upper lobe partially calcified granuloma is again noted as seen on recent CT chest exam of December 30, 2021. This would be consistent with old granulomatous disease.   2.  Otherwise unremarkable chest.   Cardiovascular   Exercise tolerance: poor (<4 METS)    ECG reviewed  Rhythm: irregular  Rate: abnormal    (+) valvular problems/murmurs MR, dysrhythmias Atrial Fib, Atrial Flutter, CHF Systolic <55%, murmur,   (-) hypertension, past MI, angina, cardiac stents, DVT, hyperlipidemia    ROS comment: · The left ventricular cavity is moderately dilated.  · Estimated left ventricular EF = 22% Left ventricular ejection fraction appears to be 21 - 25%. Left ventricular systolic function is severely decreased.  · Left ventricular diastolic dysfunction is noted.  · Left atrial volume is severely increased.  · The right atrial cavity is mildly dilated.  · Moderate mitral valve regurgitation is present.  · Moderate tricuspid valve regurgitation is present.  · There is a left pleural effusion.    Atrial flutter with 2:1 AV conduction  T wave abnormality, consider inferior ischemia  Abnormal ECG  When compared with ECG of 02-JAN-2022 06:25,  ST no longer depressed in Anterior leads    Neuro/Psych  (+) psychiatric history Anxiety and Depression,     (-) seizures, TIA, CVA, headaches, weakness, numbness  GI/Hepatic/Renal/Endo    (+)  GERD well controlled,    (-) hepatitis, liver disease, no renal disease, diabetes, no thyroid disorder    ROS Comment: BPH    Musculoskeletal     (+) arthralgias, back pain, neck pain,    Abdominal    Substance History   (+) alcohol use (occ), drug use (THC)     OB/GYN          Other   arthritis,      (-) history of cancer                  Anesthesia Plan    ASA 4 - emergent     MAC   total IV anesthesia  intravenous induction     Anesthetic plan, all risks, benefits, and alternatives have been provided, discussed and informed consent has been obtained with: patient.

## 2022-01-03 NOTE — ACP (ADVANCE CARE PLANNING)
Saw patient regarding ACP consult.  Provided information for Mr. Campa to review.  Offered further assistance if he has questions or would like help to complete a living will.

## 2022-01-03 NOTE — PROGRESS NOTES
[facility]  INPATIENT PROGRESS NOTE    Length of Stay: 3  Date of Admission: 12/31/2021  Primary Care Physician: Estella Gutierrez MD    Subjective   Chief Complaint: Palpitations/dyspnea  HPI: Patient seen and examined, doing well status post cardioversion.  Denies any palpitations or chest pain.  Reports some dyspnea but otherwise feels overall improved.    Review of Systems   Constitutional: Negative for chills and fever.   HENT: Negative for congestion.    Respiratory: Positive for shortness of breath. Negative for cough and wheezing.    Cardiovascular: Negative for chest pain and palpitations.   Gastrointestinal: Negative for abdominal pain, constipation, diarrhea and nausea.   Genitourinary: Negative.    Musculoskeletal: Negative.    Neurological: Negative.    Psychiatric/Behavioral: Negative.    All other systems reviewed and are negative.     All pertinent negatives and positives are as above. All other systems have been reviewed and are negative unless otherwise stated.     Objective    Temp:  [96.9 °F (36.1 °C)-98 °F (36.7 °C)] 97.9 °F (36.6 °C)  Heart Rate:  [114-122] 118  Resp:  [19] 19  BP: ()/(53-87) 116/78    Physical Exam  Constitutional:       General: He is not in acute distress.     Appearance: He is not toxic-appearing.   HENT:      Head: Normocephalic and atraumatic.      Right Ear: External ear normal.      Left Ear: External ear normal.      Nose: Nose normal.      Mouth/Throat:      Mouth: Mucous membranes are moist.      Pharynx: Oropharynx is clear.   Eyes:      Conjunctiva/sclera: Conjunctivae normal.   Cardiovascular:      Rate and Rhythm: Normal rate and regular rhythm.      Pulses: Normal pulses.      Heart sounds: Normal heart sounds.   Pulmonary:      Effort: Pulmonary effort is normal. No respiratory distress.      Breath sounds: Normal breath sounds.   Abdominal:      General: Bowel sounds are normal.      Palpations:  Abdomen is soft.      Tenderness: There is no abdominal tenderness.   Musculoskeletal:         General: No swelling.   Skin:     General: Skin is warm and dry.      Capillary Refill: Capillary refill takes less than 2 seconds.   Neurological:      General: No focal deficit present.      Mental Status: He is alert and oriented to person, place, and time. Mental status is at baseline.   Psychiatric:         Behavior: Behavior normal.         Thought Content: Thought content normal.         Results Review:  I have reviewed the labs, radiology results, and diagnostic studies.    Laboratory Data:   Results from last 7 days   Lab Units 01/03/22  0612 01/02/22  1348 01/02/22  0313 12/31/21  0849 12/31/21  0849 12/30/21  1100 12/30/21  1100   SODIUM mmol/L 140 139 139   < > 139   < > 135*   POTASSIUM mmol/L 3.5 3.7 3.9   < > 3.8   < > 3.8   CHLORIDE mmol/L 101 101 104   < > 103   < > 101   CO2 mmol/L 26.0 22.0 26.0   < > 24.0   < > 20.0*   BUN mg/dL 30* 29* 28*   < > 22   < > 26*   CREATININE mg/dL 1.60* 1.84* 1.55*   < > 1.12   < > 1.01   GLUCOSE mg/dL 103* 202* 137*   < > 142*   < > 151*   CALCIUM mg/dL 8.9 9.1 9.0   < > 9.1   < > 9.1   BILIRUBIN mg/dL  --   --   --   --  1.3*  --  0.9   ALK PHOS U/L  --   --   --   --  87  --  85   ALT (SGPT) U/L  --   --   --   --  16  --  17   AST (SGOT) U/L  --   --   --   --  23  --  22   ANION GAP mmol/L 13.0 16.0* 9.0   < > 12.0   < > 14.0    < > = values in this interval not displayed.     Estimated Creatinine Clearance: 65.4 mL/min (A) (by C-G formula based on SCr of 1.6 mg/dL (H)).  Results from last 7 days   Lab Units 01/02/22  0313   MAGNESIUM mg/dL 1.7         Results from last 7 days   Lab Units 01/02/22  1348 12/31/21  0849 12/30/21  1101   WBC 10*3/mm3 10.04 7.43 7.38   HEMOGLOBIN g/dL 15.1 15.1 15.3   HEMATOCRIT % 47.8 44.9 45.6   PLATELETS 10*3/mm3 206 191 192     Results from last 7 days   Lab Units 01/03/22  0612   INR  1.79*       Culture Data:   No results found for:  BLOODCX  No results found for: URINECX  No results found for: RESPCX  No results found for: WOUNDCX  No results found for: STOOLCX  No components found for: BODYFLD    Radiology Data:   Imaging Results (Last 24 Hours)     ** No results found for the last 24 hours. **          I have reviewed the patient's current medications.     Assessment/Plan     Principal Problem:    Paroxysmal atrial fibrillation with rapid ventricular response ([facility])  Active Problems:    Acute on chronic systolic CHF (congestive heart failure) ([facility])    Moderate malnutrition (CMS/[facility])    Hypotension    Plan:  -Appreciate cardiology's assistance, doing well status post cardioversion earlier this morning  -Continue with amiodarone with plans to switch back to p.o.  -Continue fluid restriction  -Blood pressures currently improved today, continue to monitor  -Discussed with cardiology, continue CCU care, will likely need heart cath at some point.  -DVT prophylaxis with Eliquis  -CODE STATUS: Full    I confirmed that the patient's Advance Care Plan is present, code status is documented, or surrogate decision maker is listed in the patient's medical record.     Discharge Planning: In process    Efrain Arevalo MD

## 2022-01-03 NOTE — PAYOR COMM NOTE
"Michelle Sandersmore  Case Management Extender  [phone] phone  [phone] fax    Kd Campa (61 y.o. Male)             Date of Birth Social Security Number Address Home Phone MRN    [id number]  [id number] [facility] [phone] [id number]    [employer] Marital Status             [employer]        Admission Date Admission Type Admitting Provider Attending Provider Department, Room/Bed    12/31/21 Emergency Celso Méndez MD Popescu, Tudor, MD [facility] CRITICAL CARE STEPDOWN, 15/A    Discharge Date Discharge Disposition Discharge Destination                         Attending Provider: Celso Méndez MD    Allergies: No Known Allergies    Isolation: None   Infection: None   Code Status: CPR   Advance Care Planning Activity    Ht: 190.5 cm (75\")   Wt: 97.9 kg (215 lb 13.3 oz)    Admission Cmt: None   Principal Problem: Paroxysmal atrial fibrillation with rapid ventricular response (HCC) [I48.0]                 Active Insurance as of 12/31/2021     Patient has no active insurance coverage on file for 12/31/2021.          Emergency Contacts      (Rel.) Home Phone Work Phone Mobile Phone    Val Doshi (Daughter) [phone] -- [phone]    Jd Campa (Son) -- -- [phone]    lissy leyva (Sister) -- -- [phone]               History & Physical      Celso Méndez MD at 12/31/21 1209          History and Physical  Celso Méndez MD  Hospitalist    Date of admission: 12/31/2021    Patient Care Team:  Estella Gutierrez MD as PCP - General (Family Medicine)    Chief complaint   Chief Complaint   Patient presents with   • Shortness of Breath   • Palpitations     Subjective     Patient is a 61 y.o. male admitted for palpitations and shortness of breath, symptoms significantly worsen even by minimal exertion.  His longstanding cardiac issues including heart failure, episodes of paroxysmal atrial fibrillation, conditions for " which he has seen out-of-town cardiologists.    History  Past Medical History:   Diagnosis Date   • Atrial fibrillation (HCC)    • BPH (benign prostatic hyperplasia)    • Chronic bronchitis (HCC)    • Chronic systolic heart failure (HCC)    • Depression    • GERD (gastroesophageal reflux disease)      Past Surgical History:   Procedure Laterality Date   • CARDIAC ELECTROPHYSIOLOGY STUDY AND ABLATION     • CARDIOVERSION       Family History   Problem Relation Age of Onset   • Hypertension Mother      Social History     Tobacco Use   • Smoking status: Current Some Day Smoker   • Smokeless tobacco: Never Used   Substance Use Topics   • Alcohol use: Not Currently   • Drug use: Not Currently     Medications Prior to Admission   Medication Sig Dispense Refill Last Dose   • aspirin 81 MG chewable tablet Chew 325 mg Daily.   Past Week at Unknown time   • omeprazole (priLOSEC) 40 MG capsule Take 40 mg by mouth Daily.   12/31/2021 at Unknown time   • tamsulosin (FLOMAX) 0.4 MG capsule 24 hr capsule Take 1 capsule by mouth Daily.   12/31/2021 at Unknown time   • venlafaxine (EFFEXOR) 75 MG tablet Take 75 mg by mouth 2 (Two) Times a Day.   12/31/2021 at Unknown time   • dilTIAZem XR (DILACOR XR) 180 MG 24 hr capsule Take 1 capsule by mouth Daily for 14 days. 14 capsule 0      Allergies:  Patient has no known allergies.    Review of Systems  Review of Systems   Constitutional: Positive for fatigue. Negative for fever.   Respiratory: Positive for shortness of breath. Negative for cough.    Cardiovascular: Positive for leg swelling. Negative for chest pain and palpitations.   Gastrointestinal: Negative for abdominal distention, abdominal pain, blood in stool, diarrhea and vomiting.   Genitourinary: Negative for dysuria, frequency, genital sores, penile discharge, penile swelling and urgency.   Musculoskeletal: Positive for arthralgias. Negative for back pain.   Skin: Positive for pallor. Negative for color change and rash.    Neurological: Positive for weakness. Negative for seizures, syncope, light-headedness and headaches.   Psychiatric/Behavioral: Negative for agitation, behavioral problems and confusion.   All other systems reviewed and are negative.      Objective     Vital Signs  Temp:  [97.7 °F (36.5 °C)-97.9 °F (36.6 °C)] 97.9 °F (36.6 °C)  Heart Rate:  [117-137] 125  Resp:  [19-32] 32  BP: (100-157)/() 105/79    Physical Exam:  Physical Exam  Vitals reviewed.   Constitutional:       Appearance: He is ill-appearing.   HENT:      Head: Normocephalic and atraumatic.   Eyes:      General: No scleral icterus.     Extraocular Movements: Extraocular movements intact.      Pupils: Pupils are equal, round, and reactive to light.   Cardiovascular:      Rate and Rhythm: Tachycardia present. Rhythm irregular.   Pulmonary:      Effort: No respiratory distress.      Breath sounds: Normal breath sounds. No stridor. No wheezing or rales.   Abdominal:      General: Bowel sounds are normal. There is no distension.      Palpations: Abdomen is soft. There is no mass.      Tenderness: There is no abdominal tenderness. There is no right CVA tenderness, left CVA tenderness or guarding.      Hernia: No hernia is present.   Musculoskeletal:         General: No swelling, tenderness or deformity.      Cervical back: Normal range of motion and neck supple. No rigidity or tenderness.      Right lower leg: No edema.      Left lower leg: No edema.   Skin:     General: Skin is warm and dry.      Coloration: Skin is pale. Skin is not jaundiced.      Findings: No bruising, erythema or lesion.   Neurological:      General: No focal deficit present.      Mental Status: He is alert and oriented to person, place, and time.      Cranial Nerves: No cranial nerve deficit.      Motor: No weakness.   Psychiatric:         Mood and Affect: Mood normal.         Behavior: Behavior normal.         Results Review:   Lab Results (last 24 hours)     Procedure Component  Value Units Date/Time    Troponin [[id number]]  (Normal) Collected: 12/31/21 1739    Specimen: Blood Updated: 12/31/21 1847     Troponin T <0.010 ng/mL     Narrative:      Troponin T Reference Range:  <= 0.03 ng/mL-   Negative for AMI  >0.03 ng/mL-     Abnormal for myocardial necrosis.  Clinicians would have to utilize clinical acumen, EKG, Troponin and serial changes to determine if it is an Acute Myocardial Infarction or myocardial injury due to an underlying chronic condition.       Results may be falsely decreased if patient taking Biotin.      D-dimer, Quantitative [[id number]]  (Abnormal) Collected: 12/31/21 1739    Specimen: Blood Updated: 12/31/21 1832     D-Dimer, Quantitative 740 ng/mL (FEU)     Narrative:      Dimer values <500 ng/ml FEU are FDA approved as aid in diagnosis of deep venous thrombosis and pulmonary embolism.  This test should not be used in an exclusion strategy with pretest probability alone.    A recent guideline regarding diagnosis for pulmonary thromboembolism recommends an adjusted exclusion criterion of age x 10 ng/ml FEU for patients >50 years of age (Marci Intern Med 2015; 163: 701-711).      Troponin [[id number]]  (Normal) Collected: 12/31/21 1009    Specimen: Blood Updated: 12/31/21 1028     Troponin T <0.010 ng/mL     Narrative:      Troponin T Reference Range:  <= 0.03 ng/mL-   Negative for AMI  >0.03 ng/mL-     Abnormal for myocardial necrosis.  Clinicians would have to utilize clinical acumen, EKG, Troponin and serial changes to determine if it is an Acute Myocardial Infarction or myocardial injury due to an underlying chronic condition.       Results may be falsely decreased if patient taking Biotin.      [place] Draw [[id number]] Collected: 12/31/21 0849    Specimen: Blood Updated: 12/31/21 1000    Narrative:      The following orders were created for panel order [place] Draw.  Procedure                               Abnormality         Status                     ---------                                -----------         ------                     Green Top (Gel)[[id number]]                                  Final result               Lavender Top[[id number]]                                     Final result               Gold Top - SST[[id number]]                                                              Light Blue Top[[id number]]                                   Final result                 Please view results for these tests on the individual orders.    Lavender Top [[id number]] Collected: 12/31/21 0849    Specimen: Blood Updated: 12/31/21 1000     Extra Tube hold for add-on     Comment: Auto resulted       Light Blue Top [[id number]] Collected: 12/31/21 0849    Specimen: Blood Updated: 12/31/21 1000     Extra Tube hold for add-on     Comment: Auto resulted       Green Top (Gel) [[id number]] Collected: 12/31/21 0849    Specimen: Blood Updated: 12/31/21 1000     Extra Tube Hold for add-ons.     Comment: Auto resulted.       Comprehensive Metabolic Panel [[id number]]  (Abnormal) Collected: 12/31/21 0849    Specimen: Blood Updated: 12/31/21 0919     Glucose 142 mg/dL      BUN 22 mg/dL      Creatinine 1.12 mg/dL      Sodium 139 mmol/L      Potassium 3.8 mmol/L      Chloride 103 mmol/L      CO2 24.0 mmol/L      Calcium 9.1 mg/dL      Total Protein 6.7 g/dL      Albumin 3.80 g/dL      ALT (SGPT) 16 U/L      AST (SGOT) 23 U/L      Alkaline Phosphatase 87 U/L      Total Bilirubin 1.3 mg/dL      eGFR Non African Amer 66 mL/min/1.73      Globulin 2.9 gm/dL      A/G Ratio 1.3 g/dL      BUN/Creatinine Ratio 19.6     Anion Gap 12.0 mmol/L     Narrative:      GFR Normal >60  Chronic Kidney Disease <60  Kidney Failure <15      Troponin [[id number]]  (Normal) Collected: 12/31/21 0849    Specimen: Blood Updated: 12/31/21 0917     Troponin T <0.010 ng/mL     Narrative:      Troponin T Reference Range:  <= 0.03 ng/mL-   Negative for AMI  >0.03 ng/mL-     Abnormal for myocardial necrosis.  Clinicians would have to  utilize clinical acumen, EKG, Troponin and serial changes to determine if it is an Acute Myocardial Infarction or myocardial injury due to an underlying chronic condition.       Results may be falsely decreased if patient taking Biotin.      BNP [[id number]]  (Abnormal) Collected: 12/31/21 0849    Specimen: Blood Updated: 12/31/21 0914     proBNP 6,616.0 pg/mL     Narrative:      Among patients with dyspnea, NT-proBNP is highly sensitive for the detection of acute congestive heart failure. In addition NT-proBNP of <300 pg/ml effectively rules out acute congestive heart failure with 99% negative predictive value.    Results may be falsely decreased if patient taking Biotin.      CBC & Differential [[id number]]  (Normal) Collected: 12/31/21 0849    Specimen: Blood Updated: 12/31/21 0852    Narrative:      The following orders were created for panel order CBC & Differential.  Procedure                               Abnormality         Status                     ---------                               -----------         ------                     CBC Auto Differential[[id number]]        Normal              Final result                 Please view results for these tests on the individual orders.    CBC Auto Differential [[id number]]  (Normal) Collected: 12/31/21 0849    Specimen: Blood Updated: 12/31/21 0852     WBC 7.43 10*3/mm3      RBC 4.73 10*6/mm3      Hemoglobin 15.1 g/dL      Hematocrit 44.9 %      MCV 94.9 fL      MCH 31.9 pg      MCHC 33.6 g/dL      RDW 13.7 %      RDW-SD 47.7 fl      MPV 10.8 fL      Platelets 191 10*3/mm3      Neutrophil % 68.7 %      Lymphocyte % 21.5 %      Monocyte % 8.2 %      Eosinophil % 0.9 %      Basophil % 0.4 %      Immature Grans % 0.3 %      Neutrophils, Absolute 5.10 10*3/mm3      Lymphocytes, Absolute 1.60 10*3/mm3      Monocytes, Absolute 0.61 10*3/mm3      Eosinophils, Absolute 0.07 10*3/mm3      Basophils, Absolute 0.03 10*3/mm3      Immature Grans, Absolute 0.02 10*3/mm3       nRBC 0.0 /100 WBC           Imaging Results (Last 24 Hours)     Procedure Component Value Units Date/Time    XR Chest 1 View [[id number]] Collected: 12/31/21 0828     Updated: 12/31/21 0932    Narrative:        PROCEDURE: Single chest view portable    REASON FOR EXAM:Chest pain protocol  chest pain protocol    FINDINGS: Comparison exam dated December 30, 2021. Cardiac and  pulmonary vasculature are normal. Left upper lobe partially  calcified granuloma is again noted as seen on recent CT chest  exam of December 30, 2021. Lungs are otherwise clear. Pleural  spaces are normal. No acute osseous abnormality.      Impression:      1.  Left upper lobe partially calcified granuloma is again noted  as seen on recent CT chest exam of December 30, 2021. This would  be consistent with old granulomatous disease.  2.  Otherwise unremarkable chest.    Electronically signed by:  Vikas Pitt MD  12/31/2021 9:31 AM CST  Workstation: [id number]          Assessment/Plan       Paroxysmal atrial fibrillation with rapid ventricular response (HCC)    Chronic systolic CHF (congestive heart failure) (HCC)    Start the patient on IV Amiodarone drip with a loading dose, IV Lasix, beta blocker and Cozaar, obtain Echo, repeat ECG in AM, consult Cardiology.    I have utilized all available immediate resources to obtain, update, or review the patient's current medications.    I confirmed that the patient's Advance Care Plan is present, code status is documented, or surrogate decision maker is listed in the patient's medical record.      12/31/21  19:30 CST      Electronically signed by Celso Méndez MD at 12/31/21 1931          Emergency Department Notes      Attila Stratton MD at 12/31/21 0819          Subjective   Patient presents to be emergency department with history of rapid heart rate weakness dyspnea on exertion and lower extremity edema.  Patient notes he is not taking in medication, had a remote history of atrial for which had  electrical cardioversion.  Patient states he was on some cardiac medications as well as Lasix for a time both of those have been decreased for over a year to many years.  Patient is very unsure of the timeline.  Patient states his family finally convinced him to come in for his symptoms of dyspnea on exertion as he is no longer able even across the room without difficulty with his breathing.  Patient is not on home oxygen.  Patient was seen at the hospital yesterday and left AGAINST MEDICAL ADVICE secondary to having taking care of his animals in his back today and similar symptomatology as yesterday.          Review of Systems   Constitutional: Positive for activity change and appetite change. Negative for chills and fever.   HENT: Negative.  Negative for congestion.    Eyes: Negative.  Negative for photophobia and visual disturbance.   Respiratory: Positive for shortness of breath. Negative for cough and chest tightness.    Cardiovascular: Positive for palpitations. Negative for chest pain.   Gastrointestinal: Negative.  Negative for abdominal pain, constipation, diarrhea, nausea and vomiting.   Endocrine: Negative.    Genitourinary: Negative.  Negative for decreased urine volume, dysuria, flank pain and hematuria.   Musculoskeletal: Negative.  Negative for arthralgias, back pain, myalgias, neck pain and neck stiffness.   Skin: Negative.  Negative for pallor.   Neurological: Negative.  Negative for dizziness, syncope, weakness, light-headedness, numbness and headaches.   Psychiatric/Behavioral: Negative.  Negative for confusion and suicidal ideas. The patient is not nervous/anxious.    All other systems reviewed and are negative.      Past Medical History:   Diagnosis Date   • CHF (congestive heart failure) (HCC)        No Known Allergies    History reviewed. No pertinent surgical history.    History reviewed. No pertinent family history.    Social History     Socioeconomic History   • Marital status:     Tobacco Use   • Smoking status: Current Some Day Smoker   • Smokeless tobacco: Never Used   Substance and Sexual Activity   • Alcohol use: Not Currently   • Drug use: Never   • Sexual activity: Defer           Objective   Physical Exam  Vitals and nursing note reviewed.   Constitutional:       General: He is not in acute distress.     Appearance: He is well-developed. He is not ill-appearing or toxic-appearing.   HENT:      Head: Normocephalic and atraumatic.   Eyes:      Extraocular Movements: Extraocular movements intact.      Conjunctiva/sclera: Conjunctivae normal.      Pupils: Pupils are equal, round, and reactive to light.   Neck:      Vascular: No JVD.   Cardiovascular:      Rate and Rhythm: Tachycardia present. Rhythm irregular.      Heart sounds: Normal heart sounds. No murmur heard.  No friction rub. No gallop.    Pulmonary:      Effort: Pulmonary effort is normal. Tachypnea present. No respiratory distress.      Breath sounds: No wheezing or rales.   Chest:      Chest wall: No tenderness.   Abdominal:      General: Bowel sounds are normal. There is no distension.      Palpations: Abdomen is soft. There is no mass.      Tenderness: There is no abdominal tenderness. There is no guarding or rebound.   Musculoskeletal:         General: Normal range of motion.      Cervical back: Normal range of motion and neck supple.   Skin:     General: Skin is warm and dry.      Capillary Refill: Capillary refill takes less than 2 seconds.   Neurological:      General: No focal deficit present.      Mental Status: He is alert and oriented to person, place, and time.   Psychiatric:         Behavior: Behavior normal.         Thought Content: Thought content normal.         Judgment: Judgment normal.         Procedures          ED Course  ED Course as of 12/31/21 1110   Fri Dec 31, 2021   1110 Atrial fibrillation with RVR with atrial flutter.  Patient currently continues with rapid ventricular response.  Patient on  diltiazem drip will be admitted for further cardiology work-up.  Very subacute symptoms possibly lasting months.  Patient was anticoagulated with Lovenox in the ED. [PC]      ED Course User Index  [PC] Attila Stratton MD                                         Labs Reviewed   COMPREHENSIVE METABOLIC PANEL - Abnormal; Notable for the following components:       Result Value    Glucose 142 (*)     Total Bilirubin 1.3 (*)     All other components within normal limits    Narrative:     GFR Normal >60  Chronic Kidney Disease <60  Kidney Failure <15     BNP (IN-HOUSE) - Abnormal; Notable for the following components:    proBNP 6,616.0 (*)     All other components within normal limits    Narrative:     Among patients with dyspnea, NT-proBNP is highly sensitive for the detection of acute congestive heart failure. In addition NT-proBNP of <300 pg/ml effectively rules out acute congestive heart failure with 99% negative predictive value.    Results may be falsely decreased if patient taking Biotin.     TROPONIN (IN-HOUSE) - Normal    Narrative:     Troponin T Reference Range:  <= 0.03 ng/mL-   Negative for AMI  >0.03 ng/mL-     Abnormal for myocardial necrosis.  Clinicians would have to utilize clinical acumen, EKG, Troponin and serial changes to determine if it is an Acute Myocardial Infarction or myocardial injury due to an underlying chronic condition.       Results may be falsely decreased if patient taking Biotin.     TROPONIN (IN-HOUSE) - Normal    Narrative:     Troponin T Reference Range:  <= 0.03 ng/mL-   Negative for AMI  >0.03 ng/mL-     Abnormal for myocardial necrosis.  Clinicians would have to utilize clinical acumen, EKG, Troponin and serial changes to determine if it is an Acute Myocardial Infarction or myocardial injury due to an underlying chronic condition.       Results may be falsely decreased if patient taking Biotin.     CBC WITH AUTO DIFFERENTIAL - Normal   RAINBOW DRAW    Narrative:     The  following orders were created for panel order [place] Draw.  Procedure                               Abnormality         Status                     ---------                               -----------         ------                     Green Top (Gel)[[id number]]                                  Final result               Lavender Top[[id number]]                                     Final result               Gold Top - SST[[id number]]                                                              Light Blue Top[[id number]]                                   Final result                 Please view results for these tests on the individual orders.   URINALYSIS W/ MICROSCOPIC IF INDICATED (NO CULTURE)   CBC AND DIFFERENTIAL    Narrative:     The following orders were created for panel order CBC & Differential.  Procedure                               Abnormality         Status                     ---------                               -----------         ------                     CBC Auto Differential[[id number]]        Normal              Final result                 Please view results for these tests on the individual orders.   GREEN TOP   LAVENDER TOP   LIGHT BLUE TOP       XR Chest 1 View   Final Result   1.  Left upper lobe partially calcified granuloma is again noted   as seen on recent CT chest exam of December 30, 2021. This would   be consistent with old granulomatous disease.   2.  Otherwise unremarkable chest.      Electronically signed by:  Vikas Pitt MD  12/31/2021 9:31 AM [facility]   Workstation: [id number]                [facility]    Final diagnoses:   Atrial flutter with rapid ventricular response (HCC)   SHI (dyspnea on exertion)   Acute congestive heart failure, unspecified heart failure type (HCC)   Peripheral edema       ED Disposition  ED Disposition     ED Disposition Condition Comment    Decision to Admit  Level of Care: Stepdown [25]   Diagnosis: Atrial flutter with rapid ventricular response (HCC) [929089]    Admitting Physician: ERASMO MÉNDEZ [[id number]]   Attending Physician: ERASMO MÉNDEZ [[id number]]   Isolate for COVID?: No [0]   Certification: I Certify That Inpatient Hospital Services Are Medically Necessary For Greater Than 2 Midnights            No follow-up provider specified.       Medication List      No changes were made to your prescriptions during this visit.          Attila Stratton MD  12/31/21 1110      Electronically signed by Attila Stratton MD at 12/31/21 1110     Karla Bojorquez RN at 12/31/21 1110        This RN notified Dr. Méndez of patients HR not decreasing with 15mg/hr of cardizem. No new orders at this time.      Karla Bojorquez RN  12/31/21 1129      Electronically signed by Karla Bojorquez RN at 12/31/21 1129     Karla Bojorquez RN at 12/31/21 1356        Report given to MILAD Shah in CCU     Karla Bojorquez RN  12/31/21 1356      Electronically signed by Karla Bojorquez RN at 12/31/21 1356       Lab Results (last 7 days)     Procedure Component Value Units Date/Time    Basic Metabolic Panel [[id number]]  (Abnormal) Collected: 01/03/22 0612    Specimen: Blood Updated: 01/03/22 0707     Glucose 103 mg/dL      BUN 30 mg/dL      Creatinine 1.60 mg/dL      Sodium 140 mmol/L      Potassium 3.5 mmol/L      Chloride 101 mmol/L      CO2 26.0 mmol/L      Calcium 8.9 mg/dL      eGFR Non African Amer 44 mL/min/1.73      BUN/Creatinine Ratio 18.8     Anion Gap 13.0 mmol/L     Narrative:      GFR Normal >60  Chronic Kidney Disease <60  Kidney Failure <15      Protime-INR [[id number]]  (Abnormal) Collected: 01/03/22 0612    Specimen: Blood Updated: 01/03/22 0705     Protime 20.4 Seconds      INR 1.79    Narrative:      Therapeutic range for most indications is 2.0-3.0 INR,  or 2.5-3.5 for mechanical heart valves.    Basic Metabolic Panel [[id number]]  (Abnormal) Collected: 01/02/22 1348    Specimen: Blood Updated: 01/02/22 1417     Glucose 202 mg/dL      BUN 29 mg/dL      Creatinine 1.84 mg/dL      Sodium  139 mmol/L      Potassium 3.7 mmol/L      Comment: Slight hemolysis detected by analyzer. Results may be affected.        Chloride 101 mmol/L      CO2 22.0 mmol/L      Calcium 9.1 mg/dL      eGFR Non African Amer 37 mL/min/1.73      BUN/Creatinine Ratio 15.8     Anion Gap 16.0 mmol/L     Narrative:      GFR Normal >60  Chronic Kidney Disease <60  Kidney Failure <15      Troponin [[id number]]  (Normal) Collected: 01/02/22 1348    Specimen: Blood Updated: 01/02/22 1417     Troponin T <0.010 ng/mL     Narrative:      Troponin T Reference Range:  <= 0.03 ng/mL-   Negative for AMI  >0.03 ng/mL-     Abnormal for myocardial necrosis.  Clinicians would have to utilize clinical acumen, EKG, Troponin and serial changes to determine if it is an Acute Myocardial Infarction or myocardial injury due to an underlying chronic condition.       Results may be falsely decreased if patient taking Biotin.      CBC (No Diff) [[id number]]  (Normal) Collected: 01/02/22 1348    Specimen: Blood Updated: 01/02/22 1358     WBC 10.04 10*3/mm3      RBC 4.94 10*6/mm3      Hemoglobin 15.1 g/dL      Hematocrit 47.8 %      MCV 96.8 fL      MCH 30.6 pg      MCHC 31.6 g/dL      RDW 14.5 %      RDW-SD 50.7 fl      MPV 11.4 fL      Platelets 206 10*3/mm3     Extra Tubes [[id number]] Collected: 01/02/22 0334    Specimen: Blood, Venous Line Updated: 01/02/22 0445    Narrative:      The following orders were created for panel order Extra Tubes.  Procedure                               Abnormality         Status                     ---------                               -----------         ------                     Lavender Top[[id number]]                                     Final result                 Please view results for these tests on the individual orders.    Lavender Top [[id number]] Collected: 01/02/22 0334    Specimen: Blood Updated: 01/02/22 0445     Extra Tube hold for add-on     Comment: Auto resulted       Basic Metabolic Panel [[id number]]   (Abnormal) Collected: 01/02/22 0313    Specimen: Blood Updated: 01/02/22 0440     Glucose 137 mg/dL      BUN 28 mg/dL      Creatinine 1.55 mg/dL      Sodium 139 mmol/L      Potassium 3.9 mmol/L      Chloride 104 mmol/L      CO2 26.0 mmol/L      Calcium 9.0 mg/dL      eGFR Non African Amer 46 mL/min/1.73      BUN/Creatinine Ratio 18.1     Anion Gap 9.0 mmol/L     Narrative:      GFR Normal >60  Chronic Kidney Disease <60  Kidney Failure <15      Magnesium [[id number]]  (Normal) Collected: 01/02/22 0313    Specimen: Blood Updated: 01/02/22 0440     Magnesium 1.7 mg/dL     Troponin [[id number]]  (Normal) Collected: 12/31/21 1739    Specimen: Blood Updated: 12/31/21 1847     Troponin T <0.010 ng/mL     Narrative:      Troponin T Reference Range:  <= 0.03 ng/mL-   Negative for AMI  >0.03 ng/mL-     Abnormal for myocardial necrosis.  Clinicians would have to utilize clinical acumen, EKG, Troponin and serial changes to determine if it is an Acute Myocardial Infarction or myocardial injury due to an underlying chronic condition.       Results may be falsely decreased if patient taking Biotin.      D-dimer, Quantitative [[id number]]  (Abnormal) Collected: 12/31/21 1739    Specimen: Blood Updated: 12/31/21 1832     D-Dimer, Quantitative 740 ng/mL (FEU)     Narrative:      Dimer values <500 ng/ml FEU are FDA approved as aid in diagnosis of deep venous thrombosis and pulmonary embolism.  This test should not be used in an exclusion strategy with pretest probability alone.    A recent guideline regarding diagnosis for pulmonary thromboembolism recommends an adjusted exclusion criterion of age x 10 ng/ml FEU for patients >50 years of age ([employer] Intern Med 2015; 163: 701-711).      Troponin [[id number]]  (Normal) Collected: 12/31/21 1009    Specimen: Blood Updated: 12/31/21 1028     Troponin T <0.010 ng/mL     Narrative:      Troponin T Reference Range:  <= 0.03 ng/mL-   Negative for AMI  >0.03 ng/mL-     Abnormal for myocardial  necrosis.  Clinicians would have to utilize clinical acumen, EKG, Troponin and serial changes to determine if it is an Acute Myocardial Infarction or myocardial injury due to an underlying chronic condition.       Results may be falsely decreased if patient taking Biotin.      [place] Draw [[id number]] Collected: 12/31/21 0849    Specimen: Blood Updated: 12/31/21 1000    Narrative:      The following orders were created for panel order [place] Draw.  Procedure                               Abnormality         Status                     ---------                               -----------         ------                     Green Top (Gel)[[id number]]                                  Final result               Lavender Top[[id number]]                                     Final result               Gold Top - SST[[id number]]                                                              Light Blue Top[[id number]]                                   Final result                 Please view results for these tests on the individual orders.    Lavender Top [[id number]] Collected: 12/31/21 0849    Specimen: Blood Updated: 12/31/21 1000     Extra Tube hold for add-on     Comment: Auto resulted       Light Blue Top [[id number]] Collected: 12/31/21 0849    Specimen: Blood Updated: 12/31/21 1000     Extra Tube hold for add-on     Comment: Auto resulted       Green Top (Gel) [[id number]] Collected: 12/31/21 0849    Specimen: Blood Updated: 12/31/21 1000     Extra Tube Hold for add-ons.     Comment: Auto resulted.       Comprehensive Metabolic Panel [[id number]]  (Abnormal) Collected: 12/31/21 0849    Specimen: Blood Updated: 12/31/21 0919     Glucose 142 mg/dL      BUN 22 mg/dL      Creatinine 1.12 mg/dL      Sodium 139 mmol/L      Potassium 3.8 mmol/L      Chloride 103 mmol/L      CO2 24.0 mmol/L      Calcium 9.1 mg/dL      Total Protein 6.7 g/dL      Albumin 3.80 g/dL      ALT (SGPT) 16 U/L      AST (SGOT) 23 U/L      Alkaline Phosphatase  87 U/L      Total Bilirubin 1.3 mg/dL      eGFR Non African Amer 66 mL/min/1.73      Globulin 2.9 gm/dL      A/G Ratio 1.3 g/dL      BUN/Creatinine Ratio 19.6     Anion Gap 12.0 mmol/L     Narrative:      GFR Normal >60  Chronic Kidney Disease <60  Kidney Failure <15      Troponin [[id number]]  (Normal) Collected: 12/31/21 0849    Specimen: Blood Updated: 12/31/21 0917     Troponin T <0.010 ng/mL     Narrative:      Troponin T Reference Range:  <= 0.03 ng/mL-   Negative for AMI  >0.03 ng/mL-     Abnormal for myocardial necrosis.  Clinicians would have to utilize clinical acumen, EKG, Troponin and serial changes to determine if it is an Acute Myocardial Infarction or myocardial injury due to an underlying chronic condition.       Results may be falsely decreased if patient taking Biotin.      BNP [[id number]]  (Abnormal) Collected: 12/31/21 0849    Specimen: Blood Updated: 12/31/21 0914     proBNP 6,616.0 pg/mL     Narrative:      Among patients with dyspnea, NT-proBNP is highly sensitive for the detection of acute congestive heart failure. In addition NT-proBNP of <300 pg/ml effectively rules out acute congestive heart failure with 99% negative predictive value.    Results may be falsely decreased if patient taking Biotin.      CBC & Differential [[id number]]  (Normal) Collected: 12/31/21 0849    Specimen: Blood Updated: 12/31/21 0852    Narrative:      The following orders were created for panel order CBC & Differential.  Procedure                               Abnormality         Status                     ---------                               -----------         ------                     CBC Auto Differential[[id number]]        Normal              Final result                 Please view results for these tests on the individual orders.    CBC Auto Differential [[id number]]  (Normal) Collected: 12/31/21 0849    Specimen: Blood Updated: 12/31/21 0852     WBC 7.43 10*3/mm3      RBC 4.73 10*6/mm3      Hemoglobin 15.1  g/dL      Hematocrit 44.9 %      MCV 94.9 fL      MCH 31.9 pg      MCHC 33.6 g/dL      RDW 13.7 %      RDW-SD 47.7 fl      MPV 10.8 fL      Platelets 191 10*3/mm3      Neutrophil % 68.7 %      Lymphocyte % 21.5 %      Monocyte % 8.2 %      Eosinophil % 0.9 %      Basophil % 0.4 %      Immature Grans % 0.3 %      Neutrophils, Absolute 5.10 10*3/mm3      Lymphocytes, Absolute 1.60 10*3/mm3      Monocytes, Absolute 0.61 10*3/mm3      Eosinophils, Absolute 0.07 10*3/mm3      Basophils, Absolute 0.03 10*3/mm3      Immature Grans, Absolute 0.02 10*3/mm3      nRBC 0.0 /100 WBC           Imaging Results (Last 7 Days)     Procedure Component Value Units Date/Time    XR Chest 1 View [[id number]] Collected: 12/31/21 0828     Updated: 12/31/21 0932    Narrative:        PROCEDURE: Single chest view portable    REASON FOR EXAM:Chest pain protocol  chest pain protocol    FINDINGS: Comparison exam dated December 30, 2021. Cardiac and  pulmonary vasculature are normal. Left upper lobe partially  calcified granuloma is again noted as seen on recent CT chest  exam of December 30, 2021. Lungs are otherwise clear. Pleural  spaces are normal. No acute osseous abnormality.      Impression:      1.  Left upper lobe partially calcified granuloma is again noted  as seen on recent CT chest exam of December 30, 2021. This would  be consistent with old granulomatous disease.  2.  Otherwise unremarkable chest.    Electronically signed by:  Vikas Pitt MD  12/31/2021 9:31 AM CST  Workstation: [id number]        ECG/EMG Results (last 7 days)     Procedure Component Value Units Date/Time    Adult Transthoracic Echo Complete W/ Cont if Necessary Per Protocol [[id number]] Resulted: 01/01/22 1058     Updated: 01/01/22 1103     Target HR (85%) 133 bpm      Max. Pred. HR (100%) 157 bpm      Echo EF Estimated 22 %     Narrative:      · The left ventricular cavity is moderately dilated.  · Estimated left ventricular EF = 22% Left ventricular ejection  fraction   appears to be 21 - 25%. Left ventricular systolic function is severely   decreased.  · Left ventricular diastolic dysfunction is noted.  · Left atrial volume is severely increased.  · The right atrial cavity is mildly dilated.  · Moderate mitral valve regurgitation is present.  · Moderate tricuspid valve regurgitation is present.  · There is a left pleural effusion.       ECG 12 Lead [[id number]] Collected: 12/31/21 0831     Updated: 01/02/22 1448     QT Interval 344 ms      QTC Interval 526 ms     Narrative:      Test Reason : SOA  Blood Pressure :   */*   mmHG  Vent. Rate : 141 BPM     Atrial Rate : 141 BPM     P-R Int :  56 ms          QRS Dur :  84 ms      QT Int : 344 ms       P-R-T Axes :   * 104 -53 degrees     QTc Int : 526 ms    Atrial flutter with 2 to 1 block  Rightward axis  Septal infarct , age undetermined  Abnormal ECG  When compared with ECG of 30-DEC-2021 11:05, Atrial flutter  ST less depressed in Inferior leads  T wave inversion less evident in Inferior leads  Nonspecific T wave abnormality no longer evident in Anterior leads    Referred By:            Confirmed By: MARTA BREWER MD    ECG 12 Lead [[id number]] Collected: 12/31/21 1709     Updated: 01/02/22 1451     QT Interval 328 ms      QTC Interval 476 ms     Narrative:      Test Reason : chest pain  Blood Pressure :   */*   mmHG  Vent. Rate : 127 BPM     Atrial Rate : 127 BPM     P-R Int : 126 ms          QRS Dur :  82 ms      QT Int : 328 ms       P-R-T Axes :   *  90 263 degrees     QTc Int : 476 ms    Atrial flutter with RVR  Rightward axis  T wave abnormality, consider lateral ischemia  Abnormal ECG  When compared with ECG of 31-DEC-2021 08:31,  Premature supraventricular complexes are no longer Present  MO interval has increased  T wave inversion now evident in Anterolateral leads    Referred By:            Confirmed By: MARTA BREWER MD    ECG 12 Lead [[id number]] Collected: 01/01/22 0640     Updated: 01/02/22 1454     QT  Interval 270 ms      QTC Interval 387 ms     Narrative:      Test Reason : Afib  Blood Pressure :   */*   mmHG  Vent. Rate : 124 BPM     Atrial Rate : 248 BPM     P-R Int :   * ms          QRS Dur :  78 ms      QT Int : 270 ms       P-R-T Axes :  55  88 -48 degrees     QTc Int : 387 ms    Atrial flutter with 2:1 AV conduction  Abnormal QRS-T angle, consider primary T wave abnormality  Abnormal ECG  When compared with ECG of 31-DEC-2021 17:09,  Atrial flutter has replaced Sinus rhythm  T wave inversion no longer evident in Anterolateral leads    Referred By:            Confirmed By: MARTA BREWER MD    ECG 12 Lead [[id number]] Collected: 01/02/22 0625     Updated: 01/02/22 1501     QT Interval 416 ms      QTC Interval 592 ms     Narrative:      Test Reason : AFib  Blood Pressure :   */*   mmHG  Vent. Rate : 122 BPM     Atrial Rate : 244 BPM     P-R Int :   * ms          QRS Dur :  72 ms      QT Int : 416 ms       P-R-T Axes :   *  89 -63 degrees     QTc Int : 592 ms    Atrial flutter with 2:1 AV conduction  ST & T wave abnormality, consider inferolateral ischemia  Abnormal ECG  When compared with ECG of 01-JAN-2022 06:40,  ST now depressed in Anterior leads  Inverted T waves have replaced nonspecific T wave abnormality in Inferior leads  T wave inversion now evident in Lateral leads    Referred By:            Confirmed By: MARTA BREWER MD    ECG 12 Lead [[id number]] Collected: 01/03/22 0546     Updated: 01/03/22 0701     QT Interval 332 ms      QTC Interval 465 ms     Narrative:      Test Reason : Amiodarone Protocol  Blood Pressure :   */*   mmHG  Vent. Rate : 118 BPM     Atrial Rate : 236 BPM     P-R Int :   * ms          QRS Dur :  80 ms      QT Int : 332 ms       P-R-T Axes :   *  80 -76 degrees     QTc Int : 465 ms    Atrial flutter with 2:1 AV conduction  T wave abnormality, consider inferior ischemia  Abnormal ECG  When compared with ECG of 02-JAN-2022 06:25,  ST no longer depressed in Anterior  leads    Referred By:            Confirmed By:     ECG 12 Lead [[id number]] Collected: 01/03/22 0949     Updated: 01/03/22 1012     QT Interval 514 ms      QTC Interval 558 ms     Narrative:      Test Reason : post cv  Blood Pressure :   */*   mmHG  Vent. Rate :  71 BPM     Atrial Rate :  71 BPM     P-R Int : 140 ms          QRS Dur :  94 ms      QT Int : 514 ms       P-R-T Axes :  83 101 191 degrees     QTc Int : 558 ms    Normal sinus rhythm with sinus arrhythmia  Lateral infarct , age undetermined  ST & T wave abnormality, consider inferior ischemia  ST & T wave abnormality, consider anterior ischemia  Prolonged QT  Abnormal ECG  When compared with ECG of 03-JAN-2022 05:46,  Significant changes have occurred    Referred By:            Confirmed By:     Adult Transesophageal Echo (SANDEEP) W/ Cont if Necessary Per Protocol (Cardiology Department) [[id number]] Collected: 01/03/22 0849     Updated: 01/03/22 1158     BSA 2.3 m^2      Ao root diam 3.6 cm      Ao root area 10.2 cm^2      LA dimension 5.7 cm      LA/Ao 1.6     Ao root area (BSA corrected) 1.6     BH CV ECHO ROLAN - BZI_BMI 26.9 kilograms/m^2       CV ECHO ROLAN - BSA(HAYCOCK) 2.3 m^2      BH CV ECHO ROLAN - BZI_METRIC_WEIGHT 97.5 kg       CV ECHO ROLAN - BZI_METRIC_HEIGHT 190.5 cm      Target HR (85%) 133 bpm      Max. Pred. HR (100%) 157 bpm      Echo EF Estimated 22 %     Narrative:      · The left ventricular cavity is moderately dilated.  · Estimated left ventricular EF = 22% Left ventricular ejection fraction   appears to be 21 - 25%. Left ventricular systolic function is severely   decreased.  · Saline test results are negative.  · The right atrial cavity is moderate to severely dilated.  · Moderate mitral valve regurgitation is present.  · Moderate tricuspid valve regurgitation is present.                Physician Progress Notes (last 7 days)      Efrain Arevalo MD at 01/03/22 1035              [facility]  Services  INPATIENT PROGRESS NOTE    Length of Stay: 3  Date of Admission: 12/31/2021  Primary Care Physician: Estella Gutierrez MD    Subjective   Chief Complaint: Palpitations/dyspnea  HPI: Patient seen and examined, doing well status post cardioversion.  Denies any palpitations or chest pain.  Reports some dyspnea but otherwise feels overall improved.    Review of Systems   Constitutional: Negative for chills and fever.   HENT: Negative for congestion.    Respiratory: Positive for shortness of breath. Negative for cough and wheezing.    Cardiovascular: Negative for chest pain and palpitations.   Gastrointestinal: Negative for abdominal pain, constipation, diarrhea and nausea.   Genitourinary: Negative.    Musculoskeletal: Negative.    Neurological: Negative.    Psychiatric/Behavioral: Negative.    All other systems reviewed and are negative.     All pertinent negatives and positives are as above. All other systems have been reviewed and are negative unless otherwise stated.     Objective    Temp:  [96.9 °F (36.1 °C)-98 °F (36.7 °C)] 97.9 °F (36.6 °C)  Heart Rate:  [114-122] 118  Resp:  [19] 19  BP: ()/(53-87) 116/78    Physical Exam  Constitutional:       General: He is not in acute distress.     Appearance: He is not toxic-appearing.   HENT:      Head: Normocephalic and atraumatic.      Right Ear: External ear normal.      Left Ear: External ear normal.      Nose: Nose normal.      Mouth/Throat:      Mouth: Mucous membranes are moist.      Pharynx: Oropharynx is clear.   Eyes:      Conjunctiva/sclera: Conjunctivae normal.   Cardiovascular:      Rate and Rhythm: Normal rate and regular rhythm.      Pulses: Normal pulses.      Heart sounds: Normal heart sounds.   Pulmonary:      Effort: Pulmonary effort is normal. No respiratory distress.      Breath sounds: Normal breath sounds.   Abdominal:      General: Bowel sounds are normal.      Palpations: Abdomen is soft.      Tenderness: There is no abdominal  tenderness.   Musculoskeletal:         General: No swelling.   Skin:     General: Skin is warm and dry.      Capillary Refill: Capillary refill takes less than 2 seconds.   Neurological:      General: No focal deficit present.      Mental Status: He is alert and oriented to person, place, and time. Mental status is at baseline.   Psychiatric:         Behavior: Behavior normal.         Thought Content: Thought content normal.         Results Review:  I have reviewed the labs, radiology results, and diagnostic studies.    Laboratory Data:   Results from last 7 days   Lab Units 01/03/22  0612 01/02/22  1348 01/02/22  0313 12/31/21  0849 12/31/21  0849 12/30/21  1100 12/30/21  1100   SODIUM mmol/L 140 139 139   < > 139   < > 135*   POTASSIUM mmol/L 3.5 3.7 3.9   < > 3.8   < > 3.8   CHLORIDE mmol/L 101 101 104   < > 103   < > 101   CO2 mmol/L 26.0 22.0 26.0   < > 24.0   < > 20.0*   BUN mg/dL 30* 29* 28*   < > 22   < > 26*   CREATININE mg/dL 1.60* 1.84* 1.55*   < > 1.12   < > 1.01   GLUCOSE mg/dL 103* 202* 137*   < > 142*   < > 151*   CALCIUM mg/dL 8.9 9.1 9.0   < > 9.1   < > 9.1   BILIRUBIN mg/dL  --   --   --   --  1.3*  --  0.9   ALK PHOS U/L  --   --   --   --  87  --  85   ALT (SGPT) U/L  --   --   --   --  16  --  17   AST (SGOT) U/L  --   --   --   --  23  --  22   ANION GAP mmol/L 13.0 16.0* 9.0   < > 12.0   < > 14.0    < > = values in this interval not displayed.     Estimated Creatinine Clearance: 65.4 mL/min (A) (by C-G formula based on SCr of 1.6 mg/dL (H)).  Results from last 7 days   Lab Units 01/02/22  0313   MAGNESIUM mg/dL 1.7         Results from last 7 days   Lab Units 01/02/22  1348 12/31/21  0849 12/30/21  1101   WBC 10*3/mm3 10.04 7.43 7.38   HEMOGLOBIN g/dL 15.1 15.1 15.3   HEMATOCRIT % 47.8 44.9 45.6   PLATELETS 10*3/mm3 206 191 192     Results from last 7 days   Lab Units 01/03/22  0612   INR  1.79*       Culture Data:   No results found for: BLOODCX  No results found for: URINECX  No results  found for: RESPCX  No results found for: WOUNDCX  No results found for: STOOLCX  No components found for: BODYFLD    Radiology Data:   Imaging Results (Last 24 Hours)     ** No results found for the last 24 hours. **          I have reviewed the patient's current medications.     Assessment/Plan     Principal Problem:    Paroxysmal atrial fibrillation with rapid ventricular response (HCC)  Active Problems:    Acute on chronic systolic CHF (congestive heart failure) (HCC)    Moderate malnutrition (CMS/HCC)    Hypotension    Plan:  -Appreciate cardiology's assistance, doing well status post cardioversion earlier this morning  -Continue with amiodarone with plans to switch back to p.o.  -Continue fluid restriction  -Blood pressures currently improved today, continue to monitor  -Discussed with cardiology, continue CCU care, will likely need heart cath at some point.  -DVT prophylaxis with Eliquis  -CODE STATUS: Full    I confirmed that the patient's Advance Care Plan is present, code status is documented, or surrogate decision maker is listed in the patient's medical record.     Discharge Planning: In process    Efrain Arevalo MD      Electronically signed by Efrain Arevalo MD at 01/03/22 1039     Altagracia Mantilal MD at 01/02/22 1306           LOS: 2 days   Patient Care Team:  Estella Gutierrez MD as PCP - General (Family Medicine)    Chief Complaint:  atrial fibrillation with rapid ventricular rate and congestive heart failure due to reduced left and systolic function  61 years old patient with background history of paroxysmal atrial fibrillation s/p EP study and A. fib ablation in the [place] area more than 3 years ago and a history of congestive heart failure have some weak muscle was on Lasix and blood pressure medicine discontinued.  The patient presented for evaluation increasing shortness of breath functional class III no limited orthopnea or PND noted in atrial fibrillation with rapid ventricular rate and  clinically patient was in congestive heart failure with elevated BNP mild lower extremity edema and chest x-ray bilateral pleural effusion no evidence of pulmonary embolism on CT pulmonary angiogram.  Troponin is normal.  Patient received 80 mg Lasix yesterday with a significant provement in shortness of breath and good diuresis.  He has marginal hemodynamic and we are unable to uptitrate beta-blocker and to start Aldactone or ACE/ARB at this stage.      Review of Systems:     Constitution:  Denies any fatigue, fever or chills.  Positive for activity change positive for fatigue    HENT:  Denies any headache, hearing impairment.    Eyes:  Denies any blurring of vision     Cardiovascular:  As per history of present illness.     Respiratory: Exertional dyspnea functional class III       Gastrointestinal:  No nausea, vomiting, or melena.    Extremity: No edema, positive pulses    Objective     Current Facility-Administered Medications   Medication Dose Route Frequency Provider Last Rate Last Admin   • amiodarone (PACERONE) tablet 200 mg  200 mg Oral Q12H Celso Méndez MD   200 mg at 01/01/22 2151   • amiodarone 360 mg in 200 mL D5W infusion  0.5 mg/min Intravenous Continuous Altagracia Mantilla MD 16.67 mL/hr at 01/01/22 2349 0.5 mg/min at 01/01/22 2349   • apixaban (ELIQUIS) tablet 5 mg  5 mg Oral Q12H Celso Méndez MD   5 mg at 01/02/22 1104   • furosemide (LASIX) injection 40 mg  40 mg Intravenous Q12H Celso Méndez MD   40 mg at 01/02/22 0603   • metoprolol tartrate (LOPRESSOR) half tablet 12.5 mg  12.5 mg Oral Q12H Celso Méndez MD   12.5 mg at 01/02/22 1104   • morphine injection 2 mg  2 mg Intravenous Q2H PRN Celso Méndez MD   2 mg at 01/01/22 2110   • ondansetron (ZOFRAN) injection 4 mg  4 mg Intravenous Q6H PRN Celso Méndez MD   4 mg at 01/01/22 2110   • pantoprazole (PROTONIX) injection 40 mg  40 mg Intravenous Q AM Celso Méndez MD   40 mg at 01/02/22 0604   • prochlorperazine (COMPAZINE)  "injection 10 mg  10 mg Intravenous Q4H PRN Arik Haines MD   10 mg at 01/02/22 1303    Or   • prochlorperazine (COMPAZINE) tablet 5 mg  5 mg Oral Q6H PRN Arik Haines MD        Or   • prochlorperazine (COMPAZINE) suppository 25 mg  25 mg Rectal Q12H PRN Arik Haines MD       • sodium chloride 0.9 % flush 10 mL  10 mL Intravenous PRN Celso Méndez MD       • sodium chloride 0.9 % flush 10 mL  10 mL Intravenous Q12H Celso Méndez MD   10 mL at 01/02/22 0844   • sodium chloride 0.9 % flush 10 mL  10 mL Intravenous PRN Celso Méndez MD   10 mL at 01/02/22 1303   • tamsulosin (FLOMAX) 24 hr capsule 0.4 mg  0.4 mg Oral Nightly Celso Méndez MD   0.4 mg at 12/31/21 2003   • venlafaxine (EFFEXOR) tablet 75 mg  75 mg Oral BID Celso Méndez MD   75 mg at 01/02/22 1104       Vital Sign Min/Max for last 24 hours  Temp  Min: 97.5 °F (36.4 °C)  Max: 97.8 °F (36.6 °C)   BP  Min: 89/62  Max: 135/69   Pulse  Min: 116  Max: 124   Resp  Min: 23  Max: 23   SpO2  Min: 91 %  Max: 99 %   Flow (L/min)  Min: 2  Max: 2   Weight  Min: 99.3 kg (218 lb 14.7 oz)  Max: 99.3 kg (218 lb 14.7 oz)     Flowsheet Rows      First Filed Value   Admission Height 190.5 cm (75\") Documented at 12/31/2021 1602   Admission Weight 87.1 kg (192 lb 0.3 oz) Documented at 12/31/2021 1559            Intake/Output Summary (Last 24 hours) at 1/2/2022 1307  Last data filed at 1/2/2022 0839  Gross per 24 hour   Intake 1000.71 ml   Output 875 ml   Net 125.71 ml       Physical Exam:  Neck: Supple.  No JVD, no thyroid enlargement.  Chest: Air entry equal, normal respiration.  No rhonchi or creps.  Cardiovascular system:  Regular rate and rhythm, no murmurs.  Abdomen: Soft, no tenderness, bowel sounds present, no hepatosplenomegaly.  CNS: Alert, oriented to place and time.  No motor or sensory deficit.  Cranial nerves intact.  Musculoskeletal: No deformity of the back or spine.  Extremities:  No edema.  Pulses equal on both sides.     Results " Review:   I reviewed the patient's new clinical results.  Lab Results   Component Value Date    WBC 7.43 12/31/2021    HGB 15.1 12/31/2021    HCT 44.9 12/31/2021    MCV 94.9 12/31/2021     12/31/2021     Lab Results   Component Value Date    GLUCOSE 137 (H) 01/02/2022    BUN 28 (H) 01/02/2022    CREATININE 1.55 (H) 01/02/2022    EGFRIFNONA 46 (L) 01/02/2022    BCR 18.1 01/02/2022    CO2 26.0 01/02/2022    CALCIUM 9.0 01/02/2022    ALBUMIN 3.80 12/31/2021    AST 23 12/31/2021    ALT 16 12/31/2021     No results found for: TSH  No results found for: INR, PROTIME  No results found for: PTT    EKG:     Telemetry:    Ejection Fraction  No results found for: EF    Echo EF Estimated  Lab Results   Component Value Date    ECHOEFEST 22 12/31/2021         Present on Admission:  • Paroxysmal atrial fibrillation with rapid ventricular response (HCC)  • Acute on chronic systolic CHF (congestive heart failure) (HCC)  • Moderate malnutrition (CMS/HCC)    Assessment/Plan   #1 atrial fibrillation with rapid ventricular rate of unknown duration     61 years old patient with previous history of paroxysmal atrial fibrillation s/p EP study and A. fib ablation at [place] more than 3 years ago had a history of congestive heart failure and left ventricular dysfunction patient mated atrial fibrillation/flutter rate is better today we will continue amiodarone.  Transesophageal guided electrical cardioversion discussed with the patient.  Procedure risk explained.  Risk included but not limited placing transesophageal echocardiogram probe, arrhythmia stroke.  Understand willing to proceed forward.  We will keep n.p.o. after midnight.  Continue Eliquis     #2 congestive heart failure possible acute on chronic     She denies history of myocardial infarction in the past.  He does have history of congestive heart failure but doesn't know left ventricle systolic function.  Recent echocardiogram revealed severe left and systolic  dysfunction dilated left ventricle severely elevated left atrial volume moderate mitral tricuspid regurgitations.  His hemodynamics are marginal we'll continue IV amiodarone and also start amiodarone 200 mg twice a day and start low-dose beta-blocker we'll hold ACE/ARB and Aldactone at this stage given the marginal hemodynamic I will arrange CHF evaluation  Given severe LV dysfunction he does need some ischemic evaluation timing depends on patient clinical condition and hospital course     Restrict 1000 fluid     Sodium 2 g to  Patient was counseled educated avoid nonsteroid and drink        #3 moderate mitral tricuspid regurgitations will reassess after electrical cardioversions as an outpatient    Altagracia Mantilla MD  01/02/22  13:07 CST      Part of this note may be an electronic transcription/translation of spoken language to printed text using the Dragon Dictation system.        Electronically signed by Altagracia Mantilla MD at 01/02/22 1309     Celso Méndez MD at 01/02/22 0915          Progress Note  Celso Méndez MD  Hospitalist    Date of visit: 1/2/2022     LOS: 2 days   Patient Care Team:  Estella Gutierrez MD as PCP - General (Family Medicine)    Chief Complaint: palpitations    Subjective     Interval History:     Patient Complaints: palpitations due to uncontrolled atrial fibrillation - remains in atrial fibrillation    History taken from: patient / nursing    Medication Review:   Current Facility-Administered Medications   Medication Dose Route Frequency Provider Last Rate Last Admin   • albumin human 25 % IV SOLN 25 g  25 g Intravenous Daily Celso Méndez MD       • amiodarone (PACERONE) tablet 200 mg  200 mg Oral Q12H Celso Méndez MD   200 mg at 01/01/22 2151   • amiodarone 360 mg in 200 mL D5W infusion  0.5 mg/min Intravenous Continuous Altagracia Mantilla MD 16.67 mL/hr at 01/02/22 1310 0.5 mg/min at 01/02/22 1310   • apixaban (ELIQUIS) tablet 5 mg  5 mg Oral Q12H Celso Méndez MD   5 mg at  01/02/22 1104   • DOBUTamine (DOBUTREX) 1 mg/mL infusion  2-20 mcg/kg/min Intravenous Titrated Celso Méndez MD       • morphine injection 2 mg  2 mg Intravenous Q2H PRN Celso Méndez MD   2 mg at 01/01/22 2110   • ondansetron (ZOFRAN) injection 4 mg  4 mg Intravenous Q6H PRN Celso Méndez MD   4 mg at 01/01/22 2110   • pantoprazole (PROTONIX) injection 40 mg  40 mg Intravenous Q AM Celso Méndez MD   40 mg at 01/02/22 0604   • prochlorperazine (COMPAZINE) injection 10 mg  10 mg Intravenous Q4H PRN Arik Haines MD   10 mg at 01/02/22 1303    Or   • prochlorperazine (COMPAZINE) tablet 5 mg  5 mg Oral Q6H PRN Arik Haines MD        Or   • prochlorperazine (COMPAZINE) suppository 25 mg  25 mg Rectal Q12H PRN Arik Haines MD       • sodium chloride 0.9 % bolus 500 mL  500 mL Intravenous Once Celso Méndez MD 1,000 mL/hr at 01/02/22 1320 500 mL at 01/02/22 1320   • sodium chloride 0.9 % flush 10 mL  10 mL Intravenous PRN Celso Méndez MD       • sodium chloride 0.9 % flush 10 mL  10 mL Intravenous Q12H Celso Méndez MD   10 mL at 01/02/22 0844   • sodium chloride 0.9 % flush 10 mL  10 mL Intravenous PRN Celso Méndez MD   10 mL at 01/02/22 1303   • tamsulosin (FLOMAX) 24 hr capsule 0.4 mg  0.4 mg Oral Nightly Celso Méndez MD   0.4 mg at 12/31/21 2003   • venlafaxine (EFFEXOR) tablet 75 mg  75 mg Oral BID Celso Méndez MD   75 mg at 01/02/22 1104       Review of Systems:   Review of Systems   Constitutional: Positive for fatigue. Negative for fever.   Respiratory: Positive for shortness of breath. Negative for cough and wheezing.    Cardiovascular: Positive for palpitations. Negative for chest pain and leg swelling.   Gastrointestinal: Negative for abdominal distention, anal bleeding, constipation, diarrhea, nausea and vomiting.   Genitourinary: Negative for flank pain, frequency, hematuria, penile pain and urgency.   Musculoskeletal: Positive for arthralgias.   Skin: Positive for  pallor. Negative for color change and rash.   Neurological: Positive for weakness. Negative for syncope, speech difficulty and headaches.   Psychiatric/Behavioral: Negative for agitation, behavioral problems and confusion.   All other systems reviewed and are negative.      Objective     Vital Signs  Temp:  [97.5 °F (36.4 °C)-97.8 °F (36.6 °C)] 97.5 °F (36.4 °C)  Heart Rate:  [115-124] 117  Resp:  [23] 23  BP: ()/(53-90) 108/76    Physical Exam:  Physical Exam  Vitals reviewed.   Constitutional:       General: He is not in acute distress.     Appearance: He is ill-appearing.   HENT:      Head: Normocephalic and atraumatic.   Eyes:      General: No scleral icterus.     Extraocular Movements: Extraocular movements intact.      Pupils: Pupils are equal, round, and reactive to light.   Cardiovascular:      Rate and Rhythm: Tachycardia present. Rhythm irregular.   Pulmonary:      Effort: Pulmonary effort is normal. No respiratory distress.      Breath sounds: No stridor. No wheezing or rales.   Chest:      Chest wall: No tenderness.   Abdominal:      General: Bowel sounds are normal. There is no distension.      Palpations: Abdomen is soft. There is no mass.      Tenderness: There is no abdominal tenderness. There is no right CVA tenderness, left CVA tenderness or guarding.   Musculoskeletal:         General: No swelling, tenderness or deformity. Normal range of motion.      Cervical back: Normal range of motion and neck supple. No rigidity or tenderness.      Right lower leg: No edema.      Left lower leg: Edema present.   Skin:     Coloration: Skin is pale. Skin is not jaundiced.      Findings: No bruising or lesion.   Neurological:      General: No focal deficit present.      Mental Status: He is alert and oriented to person, place, and time. Mental status is at baseline.      Cranial Nerves: No cranial nerve deficit.      Sensory: No sensory deficit.      Motor: No weakness.   Psychiatric:         Mood and  Affect: Mood normal.         Behavior: Behavior normal.          Results Review:    Lab Results (last 24 hours)     Procedure Component Value Units Date/Time    Extra Tubes [[id number]] Collected: 01/02/22 0334    Specimen: Blood, Venous Line Updated: 01/02/22 0445    Narrative:      The following orders were created for panel order Extra Tubes.  Procedure                               Abnormality         Status                     ---------                               -----------         ------                     Lavender Top[[id number]]                                     Final result                 Please view results for these tests on the individual orders.    Lavender Top [[id number]] Collected: 01/02/22 0334    Specimen: Blood Updated: 01/02/22 0445     Extra Tube hold for add-on     Comment: Auto resulted       Basic Metabolic Panel [[id number]]  (Abnormal) Collected: 01/02/22 0313    Specimen: Blood Updated: 01/02/22 0440     Glucose 137 mg/dL      BUN 28 mg/dL      Creatinine 1.55 mg/dL      Sodium 139 mmol/L      Potassium 3.9 mmol/L      Chloride 104 mmol/L      CO2 26.0 mmol/L      Calcium 9.0 mg/dL      eGFR Non African Amer 46 mL/min/1.73      BUN/Creatinine Ratio 18.1     Anion Gap 9.0 mmol/L     Narrative:      GFR Normal >60  Chronic Kidney Disease <60  Kidney Failure <15      Magnesium [[id number]]  (Normal) Collected: 01/02/22 0313    Specimen: Blood Updated: 01/02/22 0440     Magnesium 1.7 mg/dL           Imaging Results (Last 24 Hours)     ** No results found for the last 24 hours. **          Assessment/Plan       Paroxysmal atrial fibrillation with rapid ventricular response (HCC)    Acute on chronic systolic CHF (congestive heart failure) (HCC)    Hypotension    Moderate malnutrition (CMS/HCC)    Continue with the IV Lasix, Metoprolol at smaller doses given the borderline blood pressure values, continue with the Amiodarone and Eliquis. Cardiology consult greatly appreciated.    Stop the IV  Lasix / oral Lopressor given the degree of hypotension - start IV Dobutamine / IV Albumin.    The recent Echo shows a poor LV EF of 20 to 25%.    I confirmed that the patient's Advance Care Plan is present, code status is documented, or surrogate decision maker is listed in the patient's medical record.      Celso Méndez MD  01/02/22  13:34 CST        Electronically signed by Celso Méndez MD at 01/02/22 1334     Celso Méndez MD at 01/01/22 1120          Progress Note  Celso Méndez MD  Hospitalist    Date of visit: 1/1/2022     LOS: 1 day   Patient Care Team:  Estella Gutierrez MD as PCP - General (Family Medicine)    Chief Complaint: palpitations    Subjective     Interval History:     Patient Complaints: palpitations due to uncontrolled atrial fibrillation.    History taken from: patient / nursing    Medication Review:   Current Facility-Administered Medications   Medication Dose Route Frequency Provider Last Rate Last Admin   • amiodarone (PACERONE) tablet 200 mg  200 mg Oral Q12H Celso Méndez MD   200 mg at 01/01/22 1357   • amiodarone 360 mg in 200 mL D5W infusion  0.5 mg/min Intravenous Continuous Celso Méndez MD 16.67 mL/hr at 01/01/22 1001 0.5 mg/min at 01/01/22 1001   • amiodarone 360 mg in 200 mL D5W infusion  0.5 mg/min Intravenous Continuous Altagracia Mantilla MD 16.67 mL/hr at 01/01/22 1349 0.5 mg/min at 01/01/22 1349   • apixaban (ELIQUIS) tablet 5 mg  5 mg Oral Q12H Celso Méndez MD   5 mg at 01/01/22 0839   • furosemide (LASIX) injection 40 mg  40 mg Intravenous Q12H Celso Méndez MD       • metoprolol tartrate (LOPRESSOR) half tablet 12.5 mg  12.5 mg Oral Q12H Celso Méndez MD   12.5 mg at 01/01/22 0839   • morphine injection 2 mg  2 mg Intravenous Q2H PRN Celso Méndez MD   2 mg at 12/31/21 1659   • ondansetron (ZOFRAN) injection 4 mg  4 mg Intravenous Q6H PRN Celso Méndez MD   4 mg at 01/01/22 1159   • pantoprazole (PROTONIX) injection 40 mg  40 mg Intravenous Q AM Dev  MD Celso   40 mg at 01/01/22 0601   • sodium chloride 0.9 % flush 10 mL  10 mL Intravenous PRN Celso Méndez MD       • sodium chloride 0.9 % flush 10 mL  10 mL Intravenous Q12H Celso Méndez MD   10 mL at 01/01/22 0840   • sodium chloride 0.9 % flush 10 mL  10 mL Intravenous PRN Celso Méndez MD       • tamsulosin (FLOMAX) 24 hr capsule 0.4 mg  0.4 mg Oral Nightly Celso Méndez MD   0.4 mg at 12/31/21 2003   • venlafaxine (EFFEXOR) tablet 75 mg  75 mg Oral BID Celso Méndez MD   75 mg at 01/01/22 0839       Review of Systems:   Review of Systems   Constitutional: Positive for fatigue. Negative for fever.   Respiratory: Positive for shortness of breath. Negative for cough and wheezing.    Cardiovascular: Positive for palpitations and leg swelling. Negative for chest pain.   Gastrointestinal: Negative for abdominal distention, anal bleeding, constipation, diarrhea, nausea and vomiting.   Genitourinary: Negative for flank pain, frequency, hematuria, penile pain and urgency.   Musculoskeletal: Positive for arthralgias.   Skin: Positive for pallor. Negative for color change and rash.   Neurological: Positive for weakness. Negative for syncope, speech difficulty and headaches.   Psychiatric/Behavioral: Negative for agitation, behavioral problems and confusion.   All other systems reviewed and are negative.      Objective     Vital Signs  Temp:  [96.4 °F (35.8 °C)-97.9 °F (36.6 °C)] 96.9 °F (36.1 °C)  Heart Rate:  [119-133] 119  Resp:  [19-32] 19  BP: ()/(51-91) 105/66    Physical Exam:  Physical Exam  Vitals reviewed.   Constitutional:       General: He is not in acute distress.     Appearance: He is ill-appearing.   HENT:      Head: Normocephalic and atraumatic.   Eyes:      General: No scleral icterus.     Extraocular Movements: Extraocular movements intact.      Pupils: Pupils are equal, round, and reactive to light.   Cardiovascular:      Rate and Rhythm: Tachycardia present. Rhythm irregular.    Pulmonary:      Effort: Pulmonary effort is normal. No respiratory distress.      Breath sounds: No stridor. No wheezing or rales.   Chest:      Chest wall: No tenderness.   Abdominal:      General: Bowel sounds are normal. There is no distension.      Palpations: Abdomen is soft. There is no mass.      Tenderness: There is no abdominal tenderness. There is no right CVA tenderness, left CVA tenderness or guarding.   Musculoskeletal:         General: No swelling, tenderness or deformity. Normal range of motion.      Cervical back: Normal range of motion and neck supple. No rigidity or tenderness.      Right lower leg: No edema.      Left lower leg: Edema present.   Skin:     Coloration: Skin is pale. Skin is not jaundiced.      Findings: No bruising or lesion.   Neurological:      General: No focal deficit present.      Mental Status: He is alert and oriented to person, place, and time. Mental status is at baseline.      Cranial Nerves: No cranial nerve deficit.      Sensory: No sensory deficit.      Motor: No weakness.   Psychiatric:         Mood and Affect: Mood normal.         Behavior: Behavior normal.          Results Review:    Lab Results (last 24 hours)     Procedure Component Value Units Date/Time    Troponin [[id number]]  (Normal) Collected: 12/31/21 1739    Specimen: Blood Updated: 12/31/21 1847     Troponin T <0.010 ng/mL     Narrative:      Troponin T Reference Range:  <= 0.03 ng/mL-   Negative for AMI  >0.03 ng/mL-     Abnormal for myocardial necrosis.  Clinicians would have to utilize clinical acumen, EKG, Troponin and serial changes to determine if it is an Acute Myocardial Infarction or myocardial injury due to an underlying chronic condition.       Results may be falsely decreased if patient taking Biotin.      D-dimer, Quantitative [[id number]]  (Abnormal) Collected: 12/31/21 1739    Specimen: Blood Updated: 12/31/21 1832     D-Dimer, Quantitative 740 ng/mL (FEU)     Narrative:      Dimer values  <500 ng/ml FEU are FDA approved as aid in diagnosis of deep venous thrombosis and pulmonary embolism.  This test should not be used in an exclusion strategy with pretest probability alone.    A recent guideline regarding diagnosis for pulmonary thromboembolism recommends an adjusted exclusion criterion of age x 10 ng/ml FEU for patients >50 years of age ([employer] Intern Med 2015; 163: 701-711).            Imaging Results (Last 24 Hours)     ** No results found for the last 24 hours. **          Assessment/Plan       Paroxysmal atrial fibrillation with rapid ventricular response (HCC)    Acute on chronic systolic CHF (congestive heart failure) (HCC)    Moderate malnutrition (CMS/HCC)    Continue with the IV Lasix, Metoprolol at smaller doses given the borderline blood pressure values, continue with the Amiodarone and Eliquis. Cardiology consult appreciated.    The recent Echo shows a poor LV EF of 20 to 25%. Repeat ECG in AM.    I confirmed that the patient's Advance Care Plan is present, code status is documented, or surrogate decision maker is listed in the patient's medical record.      Celso Méndez MD  01/01/22  14:15 CST        Electronically signed by Celso Méndez MD at 01/01/22 1415       Medical Student Notes (last 7 days)  Notes from 12/27/21 1251 through 01/03/22 1251   No notes of this type exist for this encounter.            Consult Notes (last 7 days)      Altagracia Mantilla MD at 01/01/22 1333      Consult Orders    1. Inpatient Cardiology Consult [[id number]] ordered by Celso Méndez MD at 12/31/21 1656                Cardiology at [facility]  Cardiovascular Consultation Note      Kd Campa  15/[address]  [id number]  1960    DATE OF ADMISSION: 12/31/2021  DATE OF CONSULTATION:  1/1/2022    Estella Gutierrez MD  Treatment Team:   Attending Provider: Celso Méndez MD  Consulting Physician: Altagracia Mantilla MD  Admitting Provider: Celso Méndez MD    Chief Complaint   Patient  presents with   • Shortness of Breath   • Fluid Retention       Chief complaint/ Reason for Consultation atrial fibrillation with rapid ventricular rate and congestive heart failure due to reduced left and systolic function      History of Present Illness  61 years old patient with background history of paroxysmal atrial fibrillation s/p EP study and A. fib ablation in the [place] area more than 3 years ago and a history of congestive heart failure have some weak muscle was on Lasix and blood pressure medicine discontinued.  The patient presented for evaluation increasing shortness of breath functional class III, no  orthopnea or PND noted in atrial fibrillation with rapid ventricular rate and clinically patient was in congestive heart failure with elevated BNP mild lower extremity edema and chest x-ray bilateral pleural effusion no evidence of pulmonary embolism on CT pulmonary angiogram.  Troponin is normal.  Patient received 80 mg Lasix yesterday with a significant provement in shortness of breath and good diuresis.  He has marginal hemodynamic and we are unable to uptitrate beta-blocker and to start Aldactone or ACE/ARB at this stage.  Patient is on IV amiodarone with improvement in heart rate.  Last blood pressure systolic was 91 and echocardiogram severe LV dysfunction global hypokinesis significant elevated left atrial volume and moderate mitral and tricuspid regurgitations    Echo 1/1/2022    · The left ventricular cavity is moderately dilated.  · Estimated left ventricular EF = 22% Left ventricular ejection fraction appears to be 21 - 25%. Left ventricular systolic function is severely decreased.  · Left ventricular diastolic dysfunction is noted.  · Left atrial volume is severely increased.  · The right atrial cavity is mildly dilated.  · Moderate mitral valve regurgitation is present.  · Moderate tricuspid valve regurgitation is present.  · There is a left pleural effusion.        proBNP   0.0 - 900.0  pg/mL 6,616.0 High   7,792.0 High       CT pulmonary angiogram    IMPRESSION:  1. No evidence for pulmonary embolus.  2. Moderate-sized bilateral pleural effusions with dependent  bibasilar atelectasis.    Past Medical History:   Diagnosis Date   • Atrial fibrillation (HCC)    • BPH (benign prostatic hyperplasia)    • Chronic bronchitis (HCC)    • Chronic systolic heart failure (HCC)    • Depression    • GERD (gastroesophageal reflux disease)        Past Surgical History:   Procedure Laterality Date   • CARDIAC ELECTROPHYSIOLOGY STUDY AND ABLATION     • CARDIOVERSION         No Known Allergies    No current facility-administered medications on file prior to encounter.     Current Outpatient Medications on File Prior to Encounter   Medication Sig Dispense Refill   • aspirin 81 MG chewable tablet Chew 325 mg Daily.     • omeprazole (priLOSEC) 40 MG capsule Take 40 mg by mouth Daily.     • tamsulosin (FLOMAX) 0.4 MG capsule 24 hr capsule Take 1 capsule by mouth Daily.     • venlafaxine (EFFEXOR) 75 MG tablet Take 75 mg by mouth 2 (Two) Times a Day.     • dilTIAZem XR (DILACOR XR) 180 MG 24 hr capsule Take 1 capsule by mouth Daily for 14 days. 14 capsule 0       Social History     Socioeconomic History   • Marital status:    Tobacco Use   • Smoking status: Current Some Day Smoker   • Smokeless tobacco: Never Used   Substance and Sexual Activity   • Alcohol use: Not Currently   • Drug use: Not Currently   • Sexual activity: Not Currently           Review of Systems:     Constitutional: Positive for fatigue. Negative for fever.   Respiratory: Positive for shortness of breath. Negative for cough.    Cardiovascular: Positive for leg swelling. Negative for chest pain and palpitations.   Gastrointestinal: Negative for abdominal distention, abdominal pain, blood in stool, diarrhea and vomiting.   Genitourinary: Negative for dysuria, frequency, genital sores, penile discharge, penile swelling and urgency.  "  Musculoskeletal: Positive for arthralgias. Negative for back pain.   Skin: Positive for pallor. Negative for color change and rash.   Neurological: Positive for weakness. Negative for seizures, syncope, light-headedness and headaches.   Psychiatric/Behavioral: Negative for agitation, behavioral problems and confusion.   All other systems reviewed and are negative.         Objective:     Vitals:    01/01/22 1030 01/01/22 1100 01/01/22 1140 01/01/22 1200   BP: (!) 84/58 110/71 91/67    BP Location:       Patient Position:       Pulse: (!) 121 (!) 121 (!) 121    Resp:       Temp:    96.9 °F (36.1 °C)   TempSrc:       SpO2: 94% 95% 98%    Weight:       Height:         Body mass index is 27.06 kg/m².  Flowsheet Rows      First Filed Value   Admission Height 190.5 cm (75\") Documented at 12/31/2021 1602   Admission Weight 87.1 kg (192 lb 0.3 oz) Documented at 12/31/2021 1559          Intake/Output Summary (Last 24 hours) at 1/1/2022 1333  Last data filed at 1/1/2022 1016  Gross per 24 hour   Intake 903.5 ml   Output 1170 ml   Net -266.5 ml         Physical Exam   Constitutional: Cooperative, alert and oriented, well developed, well nourished, in no acute distress.     HENT:   Head: Normocephalic, conjunctivae is a pink, thyroid is nonpalpable no carotid bruit and trachea central.     Cardiovascular: Tachycardia with irregular rate rhythm positive systolic murmur at the mitral valve area at length left sternal border    Pulmonary/Chest: Chest: Decreased bilateral air entry with crackles    Abdominal: Abdomen soft, bowel sounds normoactive, no masses.    Musculoskeletal: No deformities, clubbing, cyanosis, erythema. Positive mild edema.    Neurological: No gross motor or sensory deficits noted    Skin: Warm and dry to the touch, no apparent skin lesions .     Psychiatric: Normal mood and affect. Behavior is normal.    Radiology Review    XR Chest 1 View   Final Result   1.  Left upper lobe partially calcified granuloma is " again noted   as seen on recent CT chest exam of December 30, 2021. This would   be consistent with old granulomatous disease.   2.  Otherwise unremarkable chest.      Electronically signed by:  Vikas Pitt MD  12/31/2021 9:31 AM CST   Workstation: [id number]          Lab Results:  Lab Results (last 24 hours)     Procedure Component Value Units Date/Time    Troponin [[id number]]  (Normal) Collected: 12/31/21 1739    Specimen: Blood Updated: 12/31/21 1847     Troponin T <0.010 ng/mL     Narrative:      Troponin T Reference Range:  <= 0.03 ng/mL-   Negative for AMI  >0.03 ng/mL-     Abnormal for myocardial necrosis.  Clinicians would have to utilize clinical acumen, EKG, Troponin and serial changes to determine if it is an Acute Myocardial Infarction or myocardial injury due to an underlying chronic condition.       Results may be falsely decreased if patient taking Biotin.      D-dimer, Quantitative [[id number]]  (Abnormal) Collected: 12/31/21 1739    Specimen: Blood Updated: 12/31/21 1832     D-Dimer, Quantitative 740 ng/mL (FEU)     Narrative:      Dimer values <500 ng/ml FEU are FDA approved as aid in diagnosis of deep venous thrombosis and pulmonary embolism.  This test should not be used in an exclusion strategy with pretest probability alone.    A recent guideline regarding diagnosis for pulmonary thromboembolism recommends an adjusted exclusion criterion of age x 10 ng/ml FEU for patients >50 years of age ([employer] Intern Med 2015; 163: 701-711).            I personally viewed and interpreted the patient's EKG/Telemetry data       Assessment/Plan:   #1 atrial fibrillation with rapid ventricular rate of unknown duration    61 years old patient with previous history of paroxysmal atrial fibrillation s/p EP study and A. fib ablation at [place] more than 3 years ago had a history of congestive heart failure and left ventricular dysfunction not sure but ejection fraction.  Patient clinically in heart failure  responded to IV diuretics had marginal hemodynamic.  We will continue IV amiodarone and also start the oral amiodarone.  We will try low-dose metoprolol but will hold ACE/ARB and Aldactone at this stage given the marginal hemodynamic    #2 congestive heart failure possible acute on chronic    She denies history of myocardial infarction in the past.  He does have history of congestive heart failure but doesn't know left ventricle systolic function.  Recent echocardiogram revealed severe left and systolic dysfunction dilated left ventricle severely elevated left atrial volume moderate mitral tricuspid regurgitations.  His hemodynamics are marginal we'll continue IV amiodarone and also start amiodarone 200 mg twice a day and start low-dose beta-blocker we'll hold ACE/ARB and Aldactone at this stage given the marginal hemodynamic I will arrange CHF evaluation  Given severe LV dysfunction he does need some ischemic evaluation timing depends on patient clinical condition and hospital course    Restrict 1000 fluid    Sodium 2 g to  Patient was counseled educated avoid nonsteroid and drink      #3 moderate mitral tricuspid regurgitations will reassess after electrical cardioversions as an outpatient    I spent 45  minutes caring for Kd on this date of service. This time includes time spent by me includes counseling/coordination of care as relates to the presenting problem and any ordered procedures/tests as outlined above.           Thank you for allowing me to participate in the care of Kd Campa. Feel free to contact me directly with any further questions or concerns.    Altagracia Mantilla MD  01/01/22  13:33 CST.      Part of this note may be an electronic transcription/translation of spoken language to printed text using the Dragon Dictation system.        Electronically signed by Altagracia Mantilla MD at 01/02/22 8454

## 2022-01-03 NOTE — ANESTHESIA POSTPROCEDURE EVALUATION
Patient: Kd Campa    Procedure Summary     Date: 01/03/22 Room / Location: [facility] CATH LAB    Anesthesia Start: 0909 Anesthesia Stop: 0945    Procedure: ADULT TRANSESOPHAGEAL ECHO (SANDEEP) W/ CONT IF NECESSARY PER PROTOCOL Diagnosis: (Arrhythmia)    Scheduled Providers: Altagracia Mantilla MD Provider: Jonathan Walton MD    Anesthesia Type: MAC ASA Status: 4 - Emergent          Anesthesia Type: MAC    Vitals  No vitals data found for the desired time range.          Post Anesthesia Care and Evaluation    Patient location during evaluation: bedside  Patient participation: complete - patient participated  Level of consciousness: sleepy but conscious  Pain score: 0  Pain management: adequate  Airway patency: patent  Anesthetic complications: No anesthetic complications  PONV Status: none  Cardiovascular status: hemodynamically stable  Respiratory status: nasal cannula  Hydration status: acceptable

## 2022-01-03 NOTE — PLAN OF CARE
Goal Outcome Evaluation:  Plan of Care Reviewed With: patient        Progress: no change  Outcome Summary: Pt rested comfortably with early dose of Compazine before med admin. resulting in 1st night of no vomiting.  Pt eager to go home post procedure this AM.  UOP barely adequate.  Will continue to monitor.

## 2022-01-04 ENCOUNTER — APPOINTMENT (OUTPATIENT)
Dept: ULTRASOUND IMAGING | Facility: HOSPITAL | Age: 62
End: 2022-01-04

## 2022-01-04 LAB
ALBUMIN SERPL-MCNC: 3.8 G/DL (ref 3.5–5.2)
ALBUMIN/GLOB SERPL: 1.7 G/DL
ALP SERPL-CCNC: 66 U/L (ref 39–117)
ALT SERPL W P-5'-P-CCNC: 437 U/L (ref 1–41)
ANION GAP SERPL CALCULATED.3IONS-SCNC: 13 MMOL/L (ref 5–15)
AST SERPL-CCNC: 842 U/L (ref 1–40)
BACTERIA UR QL AUTO: ABNORMAL /HPF
BILIRUB SERPL-MCNC: 1.3 MG/DL (ref 0–1.2)
BILIRUB UR QL STRIP: ABNORMAL
BUN SERPL-MCNC: 36 MG/DL (ref 8–23)
BUN/CREAT SERPL: 13.8 (ref 7–25)
CALCIUM SPEC-SCNC: 9.3 MG/DL (ref 8.6–10.5)
CHLORIDE SERPL-SCNC: 98 MMOL/L (ref 98–107)
CLARITY UR: CLEAR
CO2 SERPL-SCNC: 24 MMOL/L (ref 22–29)
COLOR UR: YELLOW
CREAT SERPL-MCNC: 2.61 MG/DL (ref 0.76–1.27)
DEPRECATED RDW RBC AUTO: 47.6 FL (ref 37–54)
ERYTHROCYTE [DISTWIDTH] IN BLOOD BY AUTOMATED COUNT: 13.9 % (ref 12.3–15.4)
GFR SERPL CREATININE-BSD FRML MDRD: 25 ML/MIN/1.73
GLOBULIN UR ELPH-MCNC: 2.2 GM/DL
GLUCOSE SERPL-MCNC: 154 MG/DL (ref 65–99)
GLUCOSE UR STRIP-MCNC: NEGATIVE MG/DL
HCT VFR BLD AUTO: 42.1 % (ref 37.5–51)
HGB BLD-MCNC: 14.1 G/DL (ref 13–17.7)
HGB UR QL STRIP.AUTO: NEGATIVE
HYALINE CASTS UR QL AUTO: ABNORMAL /LPF
KETONES UR QL STRIP: NEGATIVE
LEUKOCYTE ESTERASE UR QL STRIP.AUTO: ABNORMAL
MCH RBC QN AUTO: 31.4 PG (ref 26.6–33)
MCHC RBC AUTO-ENTMCNC: 33.5 G/DL (ref 31.5–35.7)
MCV RBC AUTO: 93.8 FL (ref 79–97)
NITRITE UR QL STRIP: NEGATIVE
PH UR STRIP.AUTO: <=5 [PH] (ref 5–9)
PLATELET # BLD AUTO: 177 10*3/MM3 (ref 140–450)
PMV BLD AUTO: 12 FL (ref 6–12)
POTASSIUM SERPL-SCNC: 4.8 MMOL/L (ref 3.5–5.2)
PROT SERPL-MCNC: 6 G/DL (ref 6–8.5)
PROT UR QL STRIP: ABNORMAL
RBC # BLD AUTO: 4.49 10*6/MM3 (ref 4.14–5.8)
RBC # UR STRIP: ABNORMAL /HPF
REF LAB TEST METHOD: ABNORMAL
SODIUM SERPL-SCNC: 135 MMOL/L (ref 136–145)
SODIUM UR-SCNC: 42 MMOL/L
SP GR UR STRIP: 1.02 (ref 1–1.03)
SQUAMOUS #/AREA URNS HPF: ABNORMAL /HPF
UROBILINOGEN UR QL STRIP: ABNORMAL
WBC # UR STRIP: ABNORMAL /HPF
WBC NRBC COR # BLD: 12.55 10*3/MM3 (ref 3.4–10.8)

## 2022-01-04 PROCEDURE — 84156 ASSAY OF PROTEIN URINE: CPT | Performed by: NURSE PRACTITIONER

## 2022-01-04 PROCEDURE — 25010000002 DOBUTAMINE PER 250 MG: Performed by: INTERNAL MEDICINE

## 2022-01-04 PROCEDURE — 99233 SBSQ HOSP IP/OBS HIGH 50: CPT | Performed by: INTERNAL MEDICINE

## 2022-01-04 PROCEDURE — 81003 URINALYSIS AUTO W/O SCOPE: CPT | Performed by: INTERNAL MEDICINE

## 2022-01-04 PROCEDURE — P9047 ALBUMIN (HUMAN), 25%, 50ML: HCPCS | Performed by: INTERNAL MEDICINE

## 2022-01-04 PROCEDURE — 25010000002 FUROSEMIDE PER 20 MG: Performed by: INTERNAL MEDICINE

## 2022-01-04 PROCEDURE — 25010000002 ALBUMIN HUMAN 25% PER 50 ML: Performed by: INTERNAL MEDICINE

## 2022-01-04 PROCEDURE — P9047 ALBUMIN (HUMAN), 25%, 50ML: HCPCS | Performed by: FAMILY MEDICINE

## 2022-01-04 PROCEDURE — 85027 COMPLETE CBC AUTOMATED: CPT | Performed by: FAMILY MEDICINE

## 2022-01-04 PROCEDURE — 25010000002 ALBUMIN HUMAN 25% PER 50 ML: Performed by: FAMILY MEDICINE

## 2022-01-04 PROCEDURE — 82570 ASSAY OF URINE CREATININE: CPT | Performed by: NURSE PRACTITIONER

## 2022-01-04 PROCEDURE — 76775 US EXAM ABDO BACK WALL LIM: CPT

## 2022-01-04 PROCEDURE — 80053 COMPREHEN METABOLIC PANEL: CPT | Performed by: FAMILY MEDICINE

## 2022-01-04 PROCEDURE — 25010000002 AMIODARONE IN DEXTROSE 5% 360-4.14 MG/200ML-% SOLUTION: Performed by: INTERNAL MEDICINE

## 2022-01-04 PROCEDURE — 84300 ASSAY OF URINE SODIUM: CPT | Performed by: NURSE PRACTITIONER

## 2022-01-04 PROCEDURE — 25010000002 ONDANSETRON PER 1 MG: Performed by: INTERNAL MEDICINE

## 2022-01-04 PROCEDURE — 81015 MICROSCOPIC EXAM OF URINE: CPT | Performed by: INTERNAL MEDICINE

## 2022-01-04 RX ORDER — FUROSEMIDE 10 MG/ML
40 INJECTION INTRAMUSCULAR; INTRAVENOUS ONCE
Status: COMPLETED | OUTPATIENT
Start: 2022-01-04 | End: 2022-01-04

## 2022-01-04 RX ORDER — ALBUMIN (HUMAN) 12.5 G/50ML
25 SOLUTION INTRAVENOUS 2 TIMES DAILY
Status: DISCONTINUED | OUTPATIENT
Start: 2022-01-04 | End: 2022-01-06

## 2022-01-04 RX ORDER — DOBUTAMINE HYDROCHLORIDE 100 MG/100ML
2.5 INJECTION INTRAVENOUS
Status: DISCONTINUED | OUTPATIENT
Start: 2022-01-04 | End: 2022-01-06

## 2022-01-04 RX ORDER — SODIUM CHLORIDE, SODIUM LACTATE, POTASSIUM CHLORIDE, CALCIUM CHLORIDE 600; 310; 30; 20 MG/100ML; MG/100ML; MG/100ML; MG/100ML
50 INJECTION, SOLUTION INTRAVENOUS CONTINUOUS
Status: DISCONTINUED | OUTPATIENT
Start: 2022-01-04 | End: 2022-01-04

## 2022-01-04 RX ADMIN — ONDANSETRON 4 MG: 2 INJECTION INTRAMUSCULAR; INTRAVENOUS at 09:29

## 2022-01-04 RX ADMIN — VENLAFAXINE 75 MG: 75 TABLET ORAL at 08:21

## 2022-01-04 RX ADMIN — SODIUM CHLORIDE, PRESERVATIVE FREE 10 ML: 5 INJECTION INTRAVENOUS at 08:21

## 2022-01-04 RX ADMIN — APIXABAN 5 MG: 5 TABLET, FILM COATED ORAL at 08:21

## 2022-01-04 RX ADMIN — VENLAFAXINE 75 MG: 75 TABLET ORAL at 20:28

## 2022-01-04 RX ADMIN — ALBUMIN HUMAN 25 G: 0.25 SOLUTION INTRAVENOUS at 08:23

## 2022-01-04 RX ADMIN — AMIODARONE HYDROCHLORIDE 0.5 MG/MIN: 1.8 INJECTION, SOLUTION INTRAVENOUS at 02:05

## 2022-01-04 RX ADMIN — DOBUTAMINE HYDROCHLORIDE 5 MCG/KG/MIN: 100 INJECTION INTRAVENOUS at 18:08

## 2022-01-04 RX ADMIN — Medication 12.5 MG: at 08:21

## 2022-01-04 RX ADMIN — APIXABAN 5 MG: 5 TABLET, FILM COATED ORAL at 20:28

## 2022-01-04 RX ADMIN — METOPROLOL SUCCINATE 25 MG: 25 TABLET, FILM COATED, EXTENDED RELEASE ORAL at 08:21

## 2022-01-04 RX ADMIN — FUROSEMIDE 40 MG: 10 INJECTION, SOLUTION INTRAVENOUS at 14:38

## 2022-01-04 RX ADMIN — FUROSEMIDE 20 MG: 20 TABLET ORAL at 08:20

## 2022-01-04 RX ADMIN — PANTOPRAZOLE SODIUM 40 MG: 40 INJECTION, POWDER, FOR SOLUTION INTRAVENOUS at 05:09

## 2022-01-04 RX ADMIN — SODIUM CHLORIDE, PRESERVATIVE FREE 10 ML: 5 INJECTION INTRAVENOUS at 20:28

## 2022-01-04 RX ADMIN — ALBUMIN HUMAN 25 G: 0.25 SOLUTION INTRAVENOUS at 20:28

## 2022-01-04 RX ADMIN — DOBUTAMINE HYDROCHLORIDE 5 MCG/KG/MIN: 100 INJECTION INTRAVENOUS at 09:30

## 2022-01-04 RX ADMIN — AMIODARONE HYDROCHLORIDE 200 MG: 200 TABLET ORAL at 08:22

## 2022-01-04 NOTE — PLAN OF CARE
Goal Outcome Evaluation:  Plan of Care Reviewed With: patient, daughter        Progress: improving  Outcome Summary: Pt is on dobutamine gtt this shift per cardiology et tolerating well. One time dose of 40mg IV lasix given this shift. Nephrology consult placed et orders have been put in. VSS. Will continue to monitor.

## 2022-01-04 NOTE — PROGRESS NOTES
[facility] Medicine Services  INPATIENT PROGRESS NOTE    Length of Stay: 4  Date of Admission: 12/31/2021  Primary Care Physician: Estella Gutierrez MD    Subjective   Chief Complaint: Palpitations/dyspnea  HPI: Doing well currently but has had difficulties with SOA requiring further diuresis.  Patient denies any current concerns. BP has been intermittently low.     Review of Systems   Constitutional: Negative for chills and fever.   HENT: Negative for congestion.    Respiratory: Positive for shortness of breath. Negative for cough and wheezing.    Cardiovascular: Negative for chest pain and palpitations.   Gastrointestinal: Negative for abdominal pain, constipation, diarrhea and nausea.   Genitourinary: Negative.    Musculoskeletal: Negative.    Neurological: Negative.    Psychiatric/Behavioral: Negative.    All other systems reviewed and are negative.     All pertinent negatives and positives are as above. All other systems have been reviewed and are negative unless otherwise stated.     Objective    Temp:  [96.7 °F (35.9 °C)-97.8 °F (36.6 °C)] 96.7 °F (35.9 °C)  Heart Rate:  [] 68  Resp:  [15-22] 17  BP: ()/(54-84) 102/70    Physical Exam  Constitutional:       General: He is not in acute distress.     Appearance: He is not toxic-appearing.   HENT:      Head: Normocephalic and atraumatic.      Right Ear: External ear normal.      Left Ear: External ear normal.      Nose: Nose normal.      Mouth/Throat:      Mouth: Mucous membranes are moist.      Pharynx: Oropharynx is clear.   Eyes:      Conjunctiva/sclera: Conjunctivae normal.   Cardiovascular:      Rate and Rhythm: Normal rate and regular rhythm.      Pulses: Normal pulses.      Heart sounds: Normal heart sounds.   Pulmonary:      Effort: Pulmonary effort is normal. No respiratory distress.      Breath sounds: Normal breath sounds.   Abdominal:      General: Bowel sounds are normal.      Palpations: Abdomen  is soft.      Tenderness: There is no abdominal tenderness.   Musculoskeletal:         General: No swelling.   Skin:     General: Skin is warm and dry.      Capillary Refill: Capillary refill takes less than 2 seconds.   Neurological:      General: No focal deficit present.      Mental Status: He is alert and oriented to person, place, and time. Mental status is at baseline.   Psychiatric:         Behavior: Behavior normal.         Thought Content: Thought content normal.         Results Review:  I have reviewed the labs, radiology results, and diagnostic studies.    Laboratory Data:   Results from last 7 days   Lab Units 01/04/22  0725 01/03/22  0612 01/02/22  1348 01/02/22  0313 12/31/21  0849 12/30/21  1100 12/30/21  1100   SODIUM mmol/L 135* 140 139   < > 139   < > 135*   POTASSIUM mmol/L 4.8 3.5 3.7   < > 3.8   < > 3.8   CHLORIDE mmol/L 98 101 101   < > 103   < > 101   CO2 mmol/L 24.0 26.0 22.0   < > 24.0   < > 20.0*   BUN mg/dL 36* 30* 29*   < > 22   < > 26*   CREATININE mg/dL 2.61* 1.60* 1.84*   < > 1.12   < > 1.01   GLUCOSE mg/dL 154* 103* 202*   < > 142*   < > 151*   CALCIUM mg/dL 9.3 8.9 9.1   < > 9.1   < > 9.1   BILIRUBIN mg/dL 1.3*  --   --   --  1.3*  --  0.9   ALK PHOS U/L 66  --   --   --  87  --  85   ALT (SGPT) U/L 437*  --   --   --  16  --  17   AST (SGOT) U/L 842*  --   --   --  23  --  22   ANION GAP mmol/L 13.0 13.0 16.0*   < > 12.0   < > 14.0    < > = values in this interval not displayed.     Estimated Creatinine Clearance: 39.7 mL/min (A) (by C-G formula based on SCr of 2.61 mg/dL (H)).  Results from last 7 days   Lab Units 01/02/22  0313   MAGNESIUM mg/dL 1.7         Results from last 7 days   Lab Units 01/04/22  0726 01/02/22  1348 12/31/21  0849 12/30/21  1101   WBC 10*3/mm3 12.55* 10.04 7.43 7.38   HEMOGLOBIN g/dL 14.1 15.1 15.1 15.3   HEMATOCRIT % 42.1 47.8 44.9 45.6   PLATELETS 10*3/mm3 177 206 191 192     Results from last 7 days   Lab Units 01/03/22  0612   INR  1.79*       Culture  Data:   No results found for: BLOODCX  No results found for: URINECX  No results found for: RESPCX  No results found for: WOUNDCX  No results found for: STOOLCX  No components found for: BODYFLD    Radiology Data:   Imaging Results (Last 24 Hours)     ** No results found for the last 24 hours. **          I have reviewed the patient's current medications.     Assessment/Plan     Principal Problem:    Paroxysmal atrial fibrillation with rapid ventricular response ([facility])  Active Problems:    Acute on chronic systolic CHF (congestive heart failure) ([facility])    Moderate malnutrition (CMS/[facility])    Hypotension    Plan:  -Appreciate cardiology's assistance, s/p cardioversion on 1/3/2022.   -Continue with amiodarone   -Continue fluid restriction  -Discussed with Cardiology, started on Dobutamine to help with BP and diuresis.    -Xanax available as well for anxiety as this is likely contributing to his shortness of breath.   -Renal function appears to be gradually worsening, likely related to intermittent low BP and possible diuresis. Will check renal US and UA.  Nephrology consult also placed.    -DVT prophylaxis with Eliquis  -CODE STATUS: Full    I confirmed that the patient's Advance Care Plan is present, code status is documented, or surrogate decision maker is listed in the patient's medical record.     Discharge Planning: In process, continue CCU care for now.     Efrain Arevalo MD

## 2022-01-04 NOTE — PROGRESS NOTES
LOS: 4 days   Patient Care Team:  Estella Gutierrez MD as PCP - General (Family Medicine)    Chief Complaint:  atrial fibrillation with rapid ventricular rate and congestive heart failure due to reduced left ventricular systolic function  Patient is s/p electrical cardioversion maintained sinus rhythm.  Will discontinue IV amiodarone.  Blood pressures are marginal.  Patient clinically heart failure.  We will start the patient dobutamine received the diuretics.  IV albumin can be utilized and if the blood pressures more than 140 mg Lasix can be content contemplated discussed with the nurse  61 years old patient with background history of paroxysmal atrial fibrillation s/p EP study and A. fib ablation in the [place] area more than 3 years ago and a history of congestive heart failure have some weak muscle was on Lasix and blood pressure medicine discontinued.  The patient presented for evaluation increasing shortness of breath functional class III no limited orthopnea or PND noted in atrial fibrillation with rapid ventricular rate and clinically patient was in congestive heart failure with elevated BNP mild lower extremity edema and chest x-ray bilateral pleural effusion no evidence of pulmonary embolism on CT pulmonary angiogram.  Troponin is normal.  Patient received 80 mg Lasix yesterday with a significant provement in shortness of breath and good diuresis.  He has marginal hemodynamic and we are unable to uptitrate beta-blocker and to start Aldactone or ACE/ARB at this stage.      Review of Systems:     Constitution:  Denies any fatigue, fever or chills.  Positive for activity change positive for fatigue    HENT:  Denies any headache, hearing impairment.    Eyes:  Denies any blurring of vision     Cardiovascular:  As per history of present illness.     Respiratory: Exertional dyspnea functional class III       Gastrointestinal:  No nausea, vomiting, or melena.    Extremity: No edema, positive  pulses    Objective     Current Facility-Administered Medications   Medication Dose Route Frequency Provider Last Rate Last Admin   • ALPRAZolam (XANAX) tablet 0.5 mg  0.5 mg Oral TID PRN Arie Lockhart MD   0.5 mg at 01/03/22 2335   • amiodarone 360 mg in 200 mL D5W infusion  0.5 mg/min Intravenous Continuous Altagracia Mantilla MD 16.67 mL/hr at 01/04/22 0205 0.5 mg/min at 01/04/22 0205   • apixaban (ELIQUIS) tablet 5 mg  5 mg Oral Q12H Celso Méndez MD   5 mg at 01/04/22 0821   • DOBUTamine (DOBUTREX) 1 mg/mL infusion  5 mcg/kg/min Intravenous Titrated Altagracia Mantilla MD 29.1 mL/hr at 01/04/22 0930 5 mcg/kg/min at 01/04/22 0930   • furosemide (LASIX) injection 40 mg  40 mg Intravenous Once Altagracia Mantilla MD       • lactated ringers bolus 250 mL  250 mL Intravenous Once Arie Lockhart MD   Held at 01/04/22 0739   • losartan (COZAAR) tablet 25 mg  25 mg Oral Q24H Altagracia Mantilla MD   25 mg at 01/03/22 1336   • Magnesium Sulfate 2 gram Bolus, followed by 8 gram infusion (total Mg dose 10 grams)- Mg less than or equal to 1mg/dL  2 g Intravenous PRN Efrain Arevalo MD        Or   • Magnesium Sulfate 2 gram / 50mL Infusion (GIVE X 3 BAGS TO EQUAL 6GM TOTAL DOSE) - Mg 1.1 - 1.5 mg/dl  2 g Intravenous PRN Efrain Arevalo MD        Or   • Magnesium Sulfate 4 gram infusion- Mg 1.6-1.9 mg/dL  4 g Intravenous PRN Efrain Arevalo MD       • metoprolol succinate XL (TOPROL-XL) 24 hr tablet 25 mg  25 mg Oral Q24H Altagracia Mantilla MD   25 mg at 01/04/22 0821   • morphine injection 2 mg  2 mg Intravenous Q2H PRN Celso Méndez MD   2 mg at 01/03/22 2225   • ondansetron (ZOFRAN) injection 4 mg  4 mg Intravenous Q6H PRN Celso Méndez MD   4 mg at 01/04/22 0929   • pantoprazole (PROTONIX) injection 40 mg  40 mg Intravenous Q AM Celso Méndez MD   40 mg at 01/04/22 0509   • potassium chloride (KLOR-CON) packet 40 mEq  40 mEq Oral PRN Efrain Arevalo MD       • potassium chloride (MICRO-K) CR capsule 40 mEq  40  "mEq Oral PRN Efrain Arevalo MD   40 mEq at 01/03/22 2046   • prochlorperazine (COMPAZINE) injection 10 mg  10 mg Intravenous Q4H PRN Arik Haines MD   10 mg at 01/03/22 2202    Or   • prochlorperazine (COMPAZINE) tablet 5 mg  5 mg Oral Q6H PRN Arik Haines MD        Or   • prochlorperazine (COMPAZINE) suppository 25 mg  25 mg Rectal Q12H PRN Arik Haines MD       • sodium chloride 0.9 % flush 10 mL  10 mL Intravenous PRN Celso Méndez MD       • sodium chloride 0.9 % flush 10 mL  10 mL Intravenous Q12H Celso Méndez MD   10 mL at 01/04/22 0821   • sodium chloride 0.9 % flush 10 mL  10 mL Intravenous PRN Celso Méndez MD   10 mL at 01/03/22 1805   • spironolactone (ALDACTONE) half tablet 12.5 mg  12.5 mg Oral Daily Altagracia Mantilla MD   12.5 mg at 01/04/22 0821   • tamsulosin (FLOMAX) 24 hr capsule 0.4 mg  0.4 mg Oral Nightly Celso Méndez MD   0.4 mg at 01/03/22 2046   • venlafaxine (EFFEXOR) tablet 75 mg  75 mg Oral BID Celso Méndez MD   75 mg at 01/04/22 0821       Vital Sign Min/Max for last 24 hours  Temp  Min: 96.7 °F (35.9 °C)  Max: 97.8 °F (36.6 °C)   BP  Min: 83/54  Max: 136/79   Pulse  Min: 67  Max: 106   Resp  Min: 15  Max: 22   SpO2  Min: 93 %  Max: 100 %   Flow (L/min)  Min: 2  Max: 3   Weight  Min: 96.9 kg (213 lb 10 oz)  Max: 96.9 kg (213 lb 10 oz)     Flowsheet Rows      First Filed Value   Admission Height 190.5 cm (75\") Documented at 12/31/2021 1602   Admission Weight 87.1 kg (192 lb 0.3 oz) Documented at 12/31/2021 1559            Intake/Output Summary (Last 24 hours) at 1/4/2022 1049  Last data filed at 1/4/2022 0400  Gross per 24 hour   Intake 1095 ml   Output 450 ml   Net 645 ml       Physical Exam:  Neck: Supple.  Positive mild JVD  Chest: Creased bilateral air entry with mild rales  Cardiovascular system:  Regular rate and rhythm, no murmurs.  Abdomen: Soft, no tenderness, bowel sounds present, no hepatosplenomegaly.  CNS: Alert, oriented to place and time.  No " motor or sensory deficit.  Cranial nerves intact.  Musculoskeletal: No deformity of the back or spine.  Extremities:  No edema.  Pulses equal on both sides.     Results Review:   I reviewed the patient's new clinical results.  Lab Results   Component Value Date    WBC 12.55 (H) 01/04/2022    HGB 14.1 01/04/2022    HCT 42.1 01/04/2022    MCV 93.8 01/04/2022     01/04/2022     Lab Results   Component Value Date    GLUCOSE 154 (H) 01/04/2022    BUN 36 (H) 01/04/2022    CREATININE 2.61 (H) 01/04/2022    EGFRIFNONA 25 (L) 01/04/2022    BCR 13.8 01/04/2022    CO2 24.0 01/04/2022    CALCIUM 9.3 01/04/2022    ALBUMIN 3.80 01/04/2022     (H) 01/04/2022     (H) 01/04/2022     No results found for: TSH  Lab Results   Component Value Date    INR 1.79 (H) 01/03/2022    PROTIME 20.4 (H) 01/03/2022     No results found for: PTT    EKG:     Telemetry:    Ejection Fraction  No results found for: EF    Echo EF Estimated  Lab Results   Component Value Date    ECHOEFEST 22 01/03/2022         Present on Admission:  • Paroxysmal atrial fibrillation with rapid ventricular response (HCC)  • Acute on chronic systolic CHF (congestive heart failure) ([facility])  • Moderate malnutrition (CMS/HCC)  • Hypotension    Assessment/Plan   #1 atrial fibrillation with rapid ventricular      Continue oral amiodarone IV amiodarone was discontinued patient remained in sinus rhythm after electrical cardio     #2 congestive heart failure possible acute on chronic     Marginal hemodynamic clinically is in heart failure.  We will start the patient dobutamine he received low-dose diuretics.  Based on the blood pressure and if needed albumin can be contemplated.  We will give 40 mg Lasix for the blood pressures is more than 100 systolic.  Started on very low-dose of beta-blocker losartan and Aldactone.  Will get congestive heart failure consultation       Restrict 1000 fluid     Sodium 2 g to  Patient was counseled educated avoid nonsteroid and  drink        #3 moderate mitral tricuspid regurgitations will reassess after electrical cardioversions as an outpatient    Altagracia Mantilla MD  01/04/22  10:49 CST      Part of this note may be an electronic transcription/translation of spoken language to printed text using the Dragon Dictation system.

## 2022-01-04 NOTE — PROGRESS NOTES
Adult Nutrition  Assessment    Patient Name:  Kd Campa  YOB: 1960  MRN: [id number]  Admit Date:  12/31/2021    Assessment Date:  1/4/2022    Comments: Pt continues w/ Afib w/ RVR and hx CHF- amio gtt continues and is s/p cardioversion. MD notes A/C CHF and diuretics continues. Cardiac diet w/ 1000ml fluid restrcition. 1/1 wt 216# and # w/ BMI 26.7. Pt met criteria for moderate, chronic malnutrition  1/1, see RD note/MSA. Pt asking for Boost- will send as beverage at meals d/t fluid restrction. RD to follow hospital course.        Anthropometrics     Row Name 01/04/22 0627          Anthropometrics    Weight 96.9 kg (213 lb 10 oz)                 Electronically signed by:  Gabrielle Lane RD  01/04/22 12:33 CST

## 2022-01-04 NOTE — CONSULTS
[facility]  [address] [place] [address]. [id number]   - [phone]  F  [phone]     Consultation         PATIENT  DEMOGRAPHICS   PATIENT NAME: Kd Campa                      PHYSICIAN: GERALD Bryant  : 1960  MRN: [id number]    Subjective   SUBJECTIVE   Referring Provider: Dr. Arevalo  Reason for Consultation: CAROLYN  History of present illness: This is a 61-year-old male with a past medical history significant for GERD, depression, CHF, BPH, atrial fibrillation who presented to the hospital on  with complaints of shortness of breath and palpitations.  He was found to have atrial fibrillation with RVR as well as symptoms of systolic heart failure and was admitted for further treatment.  Cardiology was consulted and the patient was diuresed effectively with improvement in symptoms.  He has also been treated with dobutamine infusion.  He has had intermittent low blood pressure as well as work-up for possible PE which was negative.  Unfortunately he now has worsening renal function and nephrology is consulted to help manage CAROLYN.    Past Medical History:   Diagnosis Date    Atrial fibrillation (HCC)     BPH (benign prostatic hyperplasia)     Chronic bronchitis (HCC)     Chronic systolic heart failure (HCC)     Depression     GERD (gastroesophageal reflux disease)      Past Surgical History:   Procedure Laterality Date    CARDIAC ELECTROPHYSIOLOGY STUDY AND ABLATION      CARDIOVERSION       Family History   Problem Relation Age of Onset    Hypertension Mother      Social History     Tobacco Use    Smoking status: Current Some Day Smoker    Smokeless tobacco: Never Used   Substance Use Topics    Alcohol use: Not Currently    Drug use: Not Currently     Allergies:  Patient has no known allergies.     REVIEW OF SYSTEMS    Review of Systems   All other systems reviewed and are negative.      Objective   OBJECTIVE   Vital Signs  Temp:  [96.7 °F (35.9 °C)-97.8 °F  "(36.6 °C)] 96.7 °F (35.9 °C)  Heart Rate:  [] 68  Resp:  [15-22] 17  BP: ()/(54-84) 102/70    Flowsheet Rows        First Filed Value   Admission Height 190.5 cm (75\") Documented at 12/31/2021 1602   Admission Weight 87.1 kg (192 lb 0.3 oz) Documented at 12/31/2021 1559             I/O last 3 completed shifts:  In: 1749.3 [P.O.:840; I.V.:709.3; IV Piggyback:200]  Out: 725 [Urine:725]    PHYSICAL EXAM    Physical Exam  Constitutional:       Appearance: He is well-developed.   HENT:      Head: Normocephalic and atraumatic.   Eyes:      Conjunctiva/sclera: Conjunctivae normal.      Pupils: Pupils are equal, round, and reactive to light.   Cardiovascular:      Rate and Rhythm: Normal rate and regular rhythm.   Pulmonary:      Effort: Pulmonary effort is normal.      Breath sounds: Normal breath sounds.   Abdominal:      Palpations: Abdomen is soft.   Musculoskeletal:      Cervical back: Neck supple.      Right lower leg: Edema present.      Left lower leg: Edema present.   Skin:     General: Skin is warm and dry.   Neurological:      Mental Status: He is alert and oriented to person, place, and time.   Psychiatric:         Mood and Affect: Mood normal.         Behavior: Behavior normal.         RESULTS   Results Review:    Results from last 7 days   Lab Units 01/04/22  0725 01/03/22  0612 01/02/22  1348 01/02/22  0313 12/31/21  0849 12/30/21  1100 12/30/21  1100   SODIUM mmol/L 135* 140 139   < > 139   < > 135*   POTASSIUM mmol/L 4.8 3.5 3.7   < > 3.8   < > 3.8   CHLORIDE mmol/L 98 101 101   < > 103   < > 101   CO2 mmol/L 24.0 26.0 22.0   < > 24.0   < > 20.0*   BUN mg/dL 36* 30* 29*   < > 22   < > 26*   CREATININE mg/dL 2.61* 1.60* 1.84*   < > 1.12   < > 1.01   CALCIUM mg/dL 9.3 8.9 9.1   < > 9.1   < > 9.1   BILIRUBIN mg/dL 1.3*  --   --   --  1.3*  --  0.9   ALK PHOS U/L 66  --   --   --  87  --  85   ALT (SGPT) U/L 437*  --   --   --  16  --  17   AST (SGOT) U/L 842*  --   --   --  23  --  22   GLUCOSE " mg/dL 154* 103* 202*   < > 142*   < > 151*    < > = values in this interval not displayed.       Estimated Creatinine Clearance: 39.7 mL/min (A) (by C-G formula based on SCr of 2.61 mg/dL (H)).    Results from last 7 days   Lab Units 01/02/22  0313   MAGNESIUM mg/dL 1.7             Results from last 7 days   Lab Units 01/04/22  0726 01/02/22  1348 12/31/21  0849 12/30/21  1101   WBC 10*3/mm3 12.55* 10.04 7.43 7.38   HEMOGLOBIN g/dL 14.1 15.1 15.1 15.3   PLATELETS 10*3/mm3 177 206 191 192       Results from last 7 days   Lab Units 01/03/22  0612   INR  1.79*        MEDICATIONS    albumin human, 25 g, Intravenous, BID  apixaban, 5 mg, Oral, Q12H  furosemide, 40 mg, Intravenous, Once  lactated ringers, 250 mL, Intravenous, Once  losartan, 25 mg, Oral, Q24H  metoprolol succinate XL, 25 mg, Oral, Q24H  pantoprazole, 40 mg, Intravenous, Q AM  sodium chloride, 10 mL, Intravenous, Q12H  spironolactone, 12.5 mg, Oral, Daily  tamsulosin, 0.4 mg, Oral, Nightly  venlafaxine, 75 mg, Oral, BID      amiodarone, 0.5 mg/min, Last Rate: 0.5 mg/min (01/04/22 0205)  DOBUTamine, 5 mcg/kg/min, Last Rate: 5 mcg/kg/min (01/04/22 0930)      Medications Prior to Admission   Medication Sig Dispense Refill Last Dose    aspirin 81 MG chewable tablet Chew 325 mg Daily.   Past Week at Unknown time    omeprazole (priLOSEC) 40 MG capsule Take 40 mg by mouth Daily.   12/31/2021 at Unknown time    tamsulosin (FLOMAX) 0.4 MG capsule 24 hr capsule Take 1 capsule by mouth Daily.   12/31/2021 at Unknown time    venlafaxine (EFFEXOR) 75 MG tablet Take 75 mg by mouth 2 (Two) Times a Day.   12/31/2021 at Unknown time    dilTIAZem XR (DILACOR XR) 180 MG 24 hr capsule Take 1 capsule by mouth Daily for 14 days. 14 capsule 0      Assessment/Plan   ASSESSMENT / PLAN      Paroxysmal atrial fibrillation with rapid ventricular response (HCC)    Acute on chronic systolic CHF (congestive heart failure) (HCC)    Moderate malnutrition (CMS/HCC)    Hypotension    1.   CAROLYN- Baseline creatinine around 1.1, creatinine now up to 2.6.  Etiology is likely multifactorial secondary to dye exposure from CTA, diuretics, as well as intermittent hypotension.  At present we will hold his diuretics, continue albumin 3 times daily.  Hold lisinopril, Flomax, spironolactone (due to low bp , carolyn).   Agree with checking renal ultrasound and urine studies.    2.  A. fib with RVR- cardiology following, improved following cardioversion on 1/3/2022. Has prior ablation procedure    3.  Acute on chronic CHF- cardiology following, improved, continue dobutamine    4.  Intermittent hypotension- med changes as above      Thank you for the consult, we will continue to follow the patient.         I discussed the patients findings and my recommendations with patient      This document has been electronically signed by GERALD Bryant on January 4, 2022 12:43 CST      For this patient encounter, I have reviewed the Nurse Practitioner's documentation, medical decision making, and treatment plan and personally spent time with the patient.

## 2022-01-04 NOTE — NURSING NOTE
Notified Dr. Lockhart of low UOP and x2 bladder scan w/ latest result.  MD ordered fluid bolus and gentle rehydration.  Updated MD that patient has hx CHF and crackles have been noted.  MD acknowledged and orders placed.  Jonathon Ferrer RN updated on plan.

## 2022-01-04 NOTE — PLAN OF CARE
Patient alert and oriented, up w/ standby assist. HR NSR w/ PAC's HR 70's-90's, BP soft but MAP WNL. O2 3L nasal cannula. Patient reports continual SOA, even after lasix, MD notified after patient states hx of anxiety, anxiolytic started.  Patient reports continual nausea, meds administered as seen on MAR. Inadequate UOP, patient voiding via urinal, bladder scan performed and documented.  Fall and safety precautions remain intact.       Problem: Adult Inpatient Plan of Care  Goal: Plan of Care Review  Outcome: Ongoing, Progressing  Flowsheets (Taken 1/3/2022 2211)  Progress: no change  Plan of Care Reviewed With: patient  Goal: Patient-Specific Goal (Individualized)  Outcome: Ongoing, Progressing  Goal: Absence of Hospital-Acquired Illness or Injury  Outcome: Ongoing, Progressing  Intervention: Identify and Manage Fall Risk  Recent Flowsheet Documentation  Taken 1/3/2022 2200 by Becky Kaba RN  Safety Promotion/Fall Prevention:   fall prevention program maintained   assistive device/personal items within reach   lighting adjusted   nonskid shoes/slippers when out of bed   safety round/check completed   room organization consistent  Taken 1/3/2022 2000 by Becky Kaba RN  Safety Promotion/Fall Prevention:   activity supervised   clutter free environment maintained   fall prevention program maintained   lighting adjusted   nonskid shoes/slippers when out of bed   room organization consistent   safety round/check completed  Intervention: Prevent Skin Injury  Recent Flowsheet Documentation  Taken 1/3/2022 2200 by Becky Kaba RN  Body Position: position changed independently  Taken 1/3/2022 2000 by Becky Kaba RN  Body Position: (back to bed) position changed independently  Skin Protection:   adhesive use limited   incontinence pads utilized   pulse oximeter probe site changed  Intervention: Prevent and Manage VTE (venous thromboembolism) Risk  Recent Flowsheet Documentation  Taken 1/3/2022 2000 by  Marsee, Becky, RN  VTE Prevention/Management: (see MAR) other (see comments)  Intervention: Prevent Infection  Recent Flowsheet Documentation  Taken 1/3/2022 2200 by Becky Kaba RN  Infection Prevention:   environmental surveillance performed   rest/sleep promoted  Taken 1/3/2022 2000 by Becky Kaba RN  Infection Prevention:   cohorting utilized   environmental surveillance performed   personal protective equipment utilized   rest/sleep promoted  Goal: Optimal Comfort and Wellbeing  Outcome: Ongoing, Progressing  Goal: Readiness for Transition of Care  Outcome: Ongoing, Progressing     Problem: Heart Failure Comorbidity  Goal: Maintenance of Heart Failure Symptom Control  Outcome: Ongoing, Progressing  Intervention: Maintain Heart Failure-Management Strategies  Recent Flowsheet Documentation  Taken 1/3/2022 2000 by Becky Kaba RN  Medication Review/Management: medications reviewed     Problem: Fall Injury Risk  Goal: Absence of Fall and Fall-Related Injury  Outcome: Ongoing, Progressing  Intervention: Identify and Manage Contributors to Fall Injury Risk  Recent Flowsheet Documentation  Taken 1/3/2022 2000 by Becky Kaba RN  Medication Review/Management: medications reviewed  Intervention: Promote Injury-Free Environment  Recent Flowsheet Documentation  Taken 1/3/2022 2200 by Becky Kaba RN  Safety Promotion/Fall Prevention:   fall prevention program maintained   assistive device/personal items within reach   lighting adjusted   nonskid shoes/slippers when out of bed   safety round/check completed   room organization consistent  Taken 1/3/2022 2000 by Becky Kaba RN  Safety Promotion/Fall Prevention:   activity supervised   clutter free environment maintained   fall prevention program maintained   lighting adjusted   nonskid shoes/slippers when out of bed   room organization consistent   safety round/check completed   Goal Outcome Evaluation:  Plan of Care Reviewed With:  patient        Progress: no change

## 2022-01-04 NOTE — ACP (ADVANCE CARE PLANNING)
Followed up with patient regarding ACP consult.  Mr. Campa completed a living will directive.  He also signed a durable power of  (financial) brought in by his daughter.  A copy of the living will was sent to HIM to scan into his record.

## 2022-01-05 LAB
ALBUMIN SERPL-MCNC: 3.9 G/DL (ref 3.5–5.2)
ALBUMIN/GLOB SERPL: 2.1 G/DL
ALP SERPL-CCNC: 59 U/L (ref 39–117)
ALT SERPL W P-5'-P-CCNC: 576 U/L (ref 1–41)
ANION GAP SERPL CALCULATED.3IONS-SCNC: 11 MMOL/L (ref 5–15)
AST SERPL-CCNC: 647 U/L (ref 1–40)
BASOPHILS # BLD AUTO: 0.02 10*3/MM3 (ref 0–0.2)
BASOPHILS NFR BLD AUTO: 0.2 % (ref 0–1.5)
BILIRUB SERPL-MCNC: 1.4 MG/DL (ref 0–1.2)
BUN SERPL-MCNC: 38 MG/DL (ref 8–23)
BUN/CREAT SERPL: 21.8 (ref 7–25)
CALCIUM SPEC-SCNC: 8.8 MG/DL (ref 8.6–10.5)
CHLORIDE SERPL-SCNC: 100 MMOL/L (ref 98–107)
CO2 SERPL-SCNC: 25 MMOL/L (ref 22–29)
CREAT SERPL-MCNC: 1.74 MG/DL (ref 0.76–1.27)
CREAT UR-MCNC: 127.9 MG/DL
CREAT UR-MCNC: 132.7 MG/DL
DEPRECATED RDW RBC AUTO: 47.3 FL (ref 37–54)
EOSINOPHIL # BLD AUTO: 0.01 10*3/MM3 (ref 0–0.4)
EOSINOPHIL NFR BLD AUTO: 0.1 % (ref 0.3–6.2)
ERYTHROCYTE [DISTWIDTH] IN BLOOD BY AUTOMATED COUNT: 14 % (ref 12.3–15.4)
GFR SERPL CREATININE-BSD FRML MDRD: 40 ML/MIN/1.73
GLOBULIN UR ELPH-MCNC: 1.9 GM/DL
GLUCOSE SERPL-MCNC: 143 MG/DL (ref 65–99)
HCT VFR BLD AUTO: 38.2 % (ref 37.5–51)
HGB BLD-MCNC: 13 G/DL (ref 13–17.7)
IMM GRANULOCYTES # BLD AUTO: 0.03 10*3/MM3 (ref 0–0.05)
IMM GRANULOCYTES NFR BLD AUTO: 0.4 % (ref 0–0.5)
LYMPHOCYTES # BLD AUTO: 0.64 10*3/MM3 (ref 0.7–3.1)
LYMPHOCYTES NFR BLD AUTO: 7.6 % (ref 19.6–45.3)
MCH RBC QN AUTO: 31.6 PG (ref 26.6–33)
MCHC RBC AUTO-ENTMCNC: 34 G/DL (ref 31.5–35.7)
MCV RBC AUTO: 92.9 FL (ref 79–97)
MONOCYTES # BLD AUTO: 0.52 10*3/MM3 (ref 0.1–0.9)
MONOCYTES NFR BLD AUTO: 6.2 % (ref 5–12)
NEUTROPHILS NFR BLD AUTO: 7.19 10*3/MM3 (ref 1.7–7)
NEUTROPHILS NFR BLD AUTO: 85.5 % (ref 42.7–76)
NRBC BLD AUTO-RTO: 0 /100 WBC (ref 0–0.2)
PLATELET # BLD AUTO: 139 10*3/MM3 (ref 140–450)
PMV BLD AUTO: 11.8 FL (ref 6–12)
POTASSIUM SERPL-SCNC: 3.8 MMOL/L (ref 3.5–5.2)
PROT ?TM UR-MCNC: 37 MG/DL
PROT SERPL-MCNC: 5.8 G/DL (ref 6–8.5)
PROT/CREAT UR: 278.8 MG/G CREA (ref 0–200)
RBC # BLD AUTO: 4.11 10*6/MM3 (ref 4.14–5.8)
SODIUM SERPL-SCNC: 136 MMOL/L (ref 136–145)
WBC NRBC COR # BLD: 8.41 10*3/MM3 (ref 3.4–10.8)

## 2022-01-05 PROCEDURE — 25010000002 ALBUMIN HUMAN 25% PER 50 ML: Performed by: FAMILY MEDICINE

## 2022-01-05 PROCEDURE — 85025 COMPLETE CBC W/AUTO DIFF WBC: CPT | Performed by: FAMILY MEDICINE

## 2022-01-05 PROCEDURE — P9047 ALBUMIN (HUMAN), 25%, 50ML: HCPCS | Performed by: FAMILY MEDICINE

## 2022-01-05 PROCEDURE — 80053 COMPREHEN METABOLIC PANEL: CPT | Performed by: FAMILY MEDICINE

## 2022-01-05 PROCEDURE — 25010000002 DOBUTAMINE PER 250 MG: Performed by: NURSE PRACTITIONER

## 2022-01-05 PROCEDURE — 25010000002 DOBUTAMINE PER 250 MG: Performed by: INTERNAL MEDICINE

## 2022-01-05 PROCEDURE — 99232 SBSQ HOSP IP/OBS MODERATE 35: CPT | Performed by: INTERNAL MEDICINE

## 2022-01-05 RX ORDER — AMIODARONE HYDROCHLORIDE 200 MG/1
200 TABLET ORAL 2 TIMES DAILY WITH MEALS
Status: DISCONTINUED | OUTPATIENT
Start: 2022-01-05 | End: 2022-01-08 | Stop reason: HOSPADM

## 2022-01-05 RX ADMIN — AMIODARONE HYDROCHLORIDE 200 MG: 200 TABLET ORAL at 18:00

## 2022-01-05 RX ADMIN — ALBUMIN HUMAN 25 G: 0.25 SOLUTION INTRAVENOUS at 10:06

## 2022-01-05 RX ADMIN — LOSARTAN POTASSIUM 25 MG: 25 TABLET, FILM COATED ORAL at 08:12

## 2022-01-05 RX ADMIN — ALBUMIN HUMAN 25 G: 0.25 SOLUTION INTRAVENOUS at 20:33

## 2022-01-05 RX ADMIN — APIXABAN 5 MG: 5 TABLET, FILM COATED ORAL at 08:12

## 2022-01-05 RX ADMIN — SODIUM CHLORIDE, PRESERVATIVE FREE 10 ML: 5 INJECTION INTRAVENOUS at 20:33

## 2022-01-05 RX ADMIN — SODIUM CHLORIDE, PRESERVATIVE FREE 10 ML: 5 INJECTION INTRAVENOUS at 08:12

## 2022-01-05 RX ADMIN — AMIODARONE HYDROCHLORIDE 200 MG: 200 TABLET ORAL at 10:06

## 2022-01-05 RX ADMIN — METOPROLOL SUCCINATE 25 MG: 25 TABLET, FILM COATED, EXTENDED RELEASE ORAL at 08:12

## 2022-01-05 RX ADMIN — APIXABAN 5 MG: 5 TABLET, FILM COATED ORAL at 20:33

## 2022-01-05 RX ADMIN — PANTOPRAZOLE SODIUM 40 MG: 40 INJECTION, POWDER, FOR SOLUTION INTRAVENOUS at 05:46

## 2022-01-05 RX ADMIN — DOBUTAMINE HYDROCHLORIDE 5 MCG/KG/MIN: 100 INJECTION INTRAVENOUS at 02:14

## 2022-01-05 RX ADMIN — DOBUTAMINE HYDROCHLORIDE 2.5 MCG/KG/MIN: 100 INJECTION INTRAVENOUS at 16:42

## 2022-01-05 RX ADMIN — VENLAFAXINE 75 MG: 75 TABLET ORAL at 08:12

## 2022-01-05 RX ADMIN — VENLAFAXINE 75 MG: 75 TABLET ORAL at 20:33

## 2022-01-05 NOTE — PROGRESS NOTES
[facility] Medicine Services  INPATIENT PROGRESS NOTE    Length of Stay: 5  Date of Admission: 12/31/2021  Primary Care Physician: Estella Gutierrez MD    Subjective   Chief Complaint: Palpitations/dyspnea  HPI: Seen and examined and reports feeling some better today.  No other new concerns reported.    Review of Systems   Constitutional: Negative for chills and fever.   HENT: Negative for congestion.    Respiratory: Positive for shortness of breath. Negative for cough and wheezing.    Cardiovascular: Negative for chest pain and palpitations.   Gastrointestinal: Negative for abdominal pain, constipation, diarrhea and nausea.   Genitourinary: Negative.    Musculoskeletal: Negative.    Neurological: Negative.    Psychiatric/Behavioral: Negative.    All other systems reviewed and are negative.     All pertinent negatives and positives are as above. All other systems have been reviewed and are negative unless otherwise stated.     Objective    Temp:  [97.4 °F (36.3 °C)-97.6 °F (36.4 °C)] 97.5 °F (36.4 °C)  Heart Rate:  [65-78] 68  Resp:  [14-21] 17  BP: (100-143)/(57-93) 111/66    Physical Exam  Constitutional:       General: He is not in acute distress.     Appearance: He is not toxic-appearing.   HENT:      Head: Normocephalic and atraumatic.      Right Ear: External ear normal.      Left Ear: External ear normal.      Nose: Nose normal.      Mouth/Throat:      Mouth: Mucous membranes are moist.      Pharynx: Oropharynx is clear.   Eyes:      Conjunctiva/sclera: Conjunctivae normal.   Cardiovascular:      Rate and Rhythm: Normal rate and regular rhythm.      Pulses: Normal pulses.      Heart sounds: Normal heart sounds.   Pulmonary:      Effort: Pulmonary effort is normal. No respiratory distress.      Breath sounds: Normal breath sounds.   Abdominal:      General: Bowel sounds are normal.      Palpations: Abdomen is soft.      Tenderness: There is no abdominal tenderness.    Musculoskeletal:         General: No swelling.   Skin:     General: Skin is warm and dry.      Capillary Refill: Capillary refill takes less than 2 seconds.   Neurological:      General: No focal deficit present.      Mental Status: He is alert and oriented to person, place, and time. Mental status is at baseline.   Psychiatric:         Behavior: Behavior normal.         Thought Content: Thought content normal.         Results Review:  I have reviewed the labs, radiology results, and diagnostic studies.    Laboratory Data:   Results from last 7 days   Lab Units 01/05/22  0956 01/04/22  0725 01/03/22  0612 01/02/22  0313 12/31/21  0849   SODIUM mmol/L 136 135* 140   < > 139   POTASSIUM mmol/L 3.8 4.8 3.5   < > 3.8   CHLORIDE mmol/L 100 98 101   < > 103   CO2 mmol/L 25.0 24.0 26.0   < > 24.0   BUN mg/dL 38* 36* 30*   < > 22   CREATININE mg/dL 1.74* 2.61* 1.60*   < > 1.12   GLUCOSE mg/dL 143* 154* 103*   < > 142*   CALCIUM mg/dL 8.8 9.3 8.9   < > 9.1   BILIRUBIN mg/dL 1.4* 1.3*  --   --  1.3*   ALK PHOS U/L 59 66  --   --  87   ALT (SGPT) U/L 576* 437*  --   --  16   AST (SGOT) U/L 647* 842*  --   --  23   ANION GAP mmol/L 11.0 13.0 13.0   < > 12.0    < > = values in this interval not displayed.     Estimated Creatinine Clearance: 54.6 mL/min (A) (by C-G formula based on SCr of 1.74 mg/dL (H)).  Results from last 7 days   Lab Units 01/02/22  0313   MAGNESIUM mg/dL 1.7         Results from last 7 days   Lab Units 01/05/22  0956 01/04/22  0726 01/02/22  1348 12/31/21  0849 12/30/21  1101   WBC 10*3/mm3 8.41 12.55* 10.04 7.43 7.38   HEMOGLOBIN g/dL 13.0 14.1 15.1 15.1 15.3   HEMATOCRIT % 38.2 42.1 47.8 44.9 45.6   PLATELETS 10*3/mm3 139* 177 206 191 192     Results from last 7 days   Lab Units 01/03/22  0612   INR  1.79*       Culture Data:   No results found for: BLOODCX  No results found for: URINECX  No results found for: RESPCX  No results found for: WOUNDCX  No results found for: STOOLCX  No components found for:  BODYFLD    Radiology Data:   Imaging Results (Last 24 Hours)     ** No results found for the last 24 hours. **          I have reviewed the patient's current medications.     Assessment/Plan     Principal Problem:    Paroxysmal atrial fibrillation with rapid ventricular response ([facility])  Active Problems:    Acute on chronic systolic CHF (congestive heart failure) ([facility])    Moderate malnutrition (CMS/[facility])    Hypotension    Plan:  -Appreciate cardiology's assistance, s/p cardioversion on 1/3/2022.   -Continue with amiodarone   -Continue fluid restriction  -Continue dobutamine per current recommendations  -Xanax available as well for anxiety as this is likely contributing to his shortness of breath.   -Appreciate nephrology's assistance  -DVT prophylaxis with Eliquis  -CODE STATUS: Full    I confirmed that the patient's Advance Care Plan is present, code status is documented, or surrogate decision maker is listed in the patient's medical record.     Discharge Planning: In process, continue CCU care for now.     Efrain Arevalo MD

## 2022-01-05 NOTE — PLAN OF CARE
Patient alert and oriented, resting comfortably, wakes up w/o issue. HR NSR w/ occasional ectopic beats, BP WNL. O2 titrated to 2L as tolerated.  Meds administered as seen on MAR. Improved UOP, patient continues to void via urinal.  Patient denies any nausea or pain. Fall and safety precautions remain intact.       Problem: Adult Inpatient Plan of Care  Goal: Plan of Care Review  Outcome: Ongoing, Progressing  Flowsheets (Taken 1/5/2022 0129)  Progress: improving  Plan of Care Reviewed With:   patient   son  Goal: Patient-Specific Goal (Individualized)  Outcome: Ongoing, Progressing  Goal: Absence of Hospital-Acquired Illness or Injury  Outcome: Ongoing, Progressing  Intervention: Identify and Manage Fall Risk  Recent Flowsheet Documentation  Taken 1/4/2022 2200 by Becky Kaba RN  Safety Promotion/Fall Prevention:   fall prevention program maintained   clutter free environment maintained   activity supervised   nonskid shoes/slippers when out of bed   room organization consistent   safety round/check completed  Taken 1/4/2022 2030 by Becky Kaba RN  Safety Promotion/Fall Prevention:   activity supervised   clutter free environment maintained   fall prevention program maintained   lighting adjusted   nonskid shoes/slippers when out of bed   room organization consistent   safety round/check completed  Intervention: Prevent Skin Injury  Recent Flowsheet Documentation  Taken 1/4/2022 2200 by Becky Kaba RN  Body Position: position changed independently  Taken 1/4/2022 2030 by Becky Kaba RN  Body Position: position changed independently  Skin Protection:   adhesive use limited   incontinence pads utilized  Intervention: Prevent and Manage VTE (venous thromboembolism) Risk  Recent Flowsheet Documentation  Taken 1/4/2022 2030 by Becky Kaba RN  VTE Prevention/Management: (see MAR) other (see comments)  Intervention: Prevent Infection  Recent Flowsheet Documentation  Taken 1/4/2022 2200 by  Becky Kaba RN  Infection Prevention:   environmental surveillance performed   rest/sleep promoted  Taken 1/4/2022 2030 by Becky Kaba RN  Infection Prevention:   environmental surveillance performed   personal protective equipment utilized   rest/sleep promoted  Goal: Optimal Comfort and Wellbeing  Outcome: Ongoing, Progressing  Goal: Readiness for Transition of Care  Outcome: Ongoing, Progressing     Problem: Heart Failure Comorbidity  Goal: Maintenance of Heart Failure Symptom Control  Outcome: Ongoing, Progressing  Intervention: Maintain Heart Failure-Management Strategies  Recent Flowsheet Documentation  Taken 1/4/2022 2030 by Becky Kaba RN  Medication Review/Management: medications reviewed     Problem: Fall Injury Risk  Goal: Absence of Fall and Fall-Related Injury  Outcome: Ongoing, Progressing  Intervention: Identify and Manage Contributors to Fall Injury Risk  Recent Flowsheet Documentation  Taken 1/4/2022 2030 by Becky Kaba RN  Medication Review/Management: medications reviewed  Intervention: Promote Injury-Free Environment  Recent Flowsheet Documentation  Taken 1/4/2022 2200 by Becky Kaba RN  Safety Promotion/Fall Prevention:   fall prevention program maintained   clutter free environment maintained   activity supervised   nonskid shoes/slippers when out of bed   room organization consistent   safety round/check completed  Taken 1/4/2022 2030 by Becky Kaba RN  Safety Promotion/Fall Prevention:   activity supervised   clutter free environment maintained   fall prevention program maintained   lighting adjusted   nonskid shoes/slippers when out of bed   room organization consistent   safety round/check completed   Goal Outcome Evaluation:  Plan of Care Reviewed With: patient, son        Progress: improving

## 2022-01-05 NOTE — PAYOR COMM NOTE
"      Misti Iqbal RN [facility]  [phone]     Phone  [phone]      Fax  Cont. Stay rview      CampaAnnamarie (61 y.o. Male)             Date of Birth Social Security Number Address Home Phone MRN    1960  [id number] [facility] [phone] [id number]    Sabianist Marital Status             Hindu        Admission Date Admission Type Admitting Provider Attending Provider Department, Room/Bed    12/31/21 Emergency Celso Méndez MD Popescu, Tudor, MD [facility] CRITICAL CARE STEPDOWN, 15/A    Discharge Date Discharge Disposition Discharge Destination                         Attending Provider: Celso Méndez MD    Allergies: No Known Allergies    Isolation: None   Infection: None   Code Status: CPR   Advance Care Planning Activity    Ht: 190.5 cm (75\")   Wt: 88.8 kg (195 lb 12.3 oz)    Admission Cmt: None   Principal Problem: Paroxysmal atrial fibrillation with rapid ventricular response (HCC) [I48.0]                 Active Insurance as of 12/31/2021     Primary Coverage     Payor Plan Insurance Group Employer/Plan Group    AETNA MEDICARE REPLACEMENT [place] MEDIC[place]AC[place] [id number]     Payor Plan Address Payor Plan Phone Number Payor Plan Fax Number Effective Dates    [address] [phone]  9/1/2021 - 12/31/2021    [facility] [address]       Subscriber Name Subscriber Birth Date Member ID       ANNAMARIE CAMPA DHRUV 1960 [id number]                 Emergency Contacts      (Rel.) Home Phone Work Phone Mobile Phone    Vla Doshi (Daughter) [phone] -- [phone]    Jd Campa (Son) -- -- [phone]    lissy leyva (Sister) -- -- [phone]            Vital Signs (last day)     Date/Time Temp Temp src Pulse Resp BP Patient Position SpO2    01/05/22 0600 -- -- 69 17 123/71 -- 94    01/05/22 0500 -- -- 67 18 126/68 -- 94    01/05/22 0400 -- -- 71 -- -- -- 95    " 01/05/22 0330 97.5 (36.4) Temporal 72 20 110/67 Lying 95    01/05/22 0300 -- -- 65 17 105/68 -- 93    01/05/22 0200 -- -- 67 20 113/72 -- 95    01/05/22 0100 -- -- 68 17 101/66 -- 95    01/05/22 0000 -- -- 71 15 112/75 -- 95    01/04/22 2343 97.6 (36.4) Oral -- -- -- -- --    01/04/22 2300 -- -- 71 21 103/76 -- 95    01/04/22 2200 -- -- 66 19 104/64 -- 97    01/04/22 2100 -- -- 68 14 103/63 -- 97    01/04/22 2017 97.4 (36.3) Oral -- -- -- -- --    01/04/22 2000 -- -- 74 15 143/77 -- 95    01/04/22 1900 -- -- 68 -- 101/67 -- 97    01/04/22 1800 -- -- 66 -- 111/72 -- 98    01/04/22 1630 -- -- 69 -- 113/85 -- --    01/04/22 1600 -- -- 66 -- 107/73 -- 97    01/04/22 1500 -- -- 67 -- 118/67 -- 97    01/04/22 1430 -- -- 66 -- 113/71 -- 97    01/04/22 1400 -- -- 72 -- 110/74 -- 97    01/04/22 1330 -- -- 64 -- 90/60 -- 97    01/04/22 1300 -- -- 68 -- 111/75 -- 97    01/04/22 1200 -- -- 65 -- 110/77 -- 97    01/04/22 1130 -- -- 67 -- 106/75 -- 96    01/04/22 1100 -- -- 66 -- 102/67 -- 96    01/04/22 1000 -- -- 68 -- 102/70 -- 97    01/04/22 0900 -- -- 67 -- 95/68 -- 97    01/04/22 0800 97 (36.1) Oral 71 -- 94/63 -- 97    01/04/22 0627 -- -- 72 17 95/70 -- 97    01/04/22 0500 -- -- 69 15 86/71 -- 98    01/04/22 0400 96.7 (35.9) Temporal 68 19 87/66 Lying 100    01/04/22 0300 -- -- 68 18 90/67 -- 94    01/04/22 0200 -- -- 69 18 83/54 -- 98    01/04/22 0100 -- -- 76 22 105/59 -- 93    01/04/22 0000 97.8 (36.6) Temporal 74 17 83/59 Lying 98          Oxygen Therapy (last day)     Date/Time SpO2 Device (Oxygen Therapy) Flow (L/min) Oxygen Concentration (%) ETCO2 (mmHg)    01/05/22 0815 -- nasal cannula 2 -- --    01/05/22 0600 94 -- -- -- --    01/05/22 0500 94 -- -- -- --    01/05/22 0400 95 -- -- -- --    01/05/22 0330 95 nasal cannula 2 -- --    01/05/22 0300 93 -- -- -- --    01/05/22 0200 95 nasal cannula 2 -- --    01/05/22 0100 95 -- -- -- --    01/05/22 0000 95 -- -- -- --    01/04/22 2300 95 -- -- -- --    01/04/22 2200  97 -- -- -- --    01/04/22 2100 97 -- -- -- --    01/04/22 2030 -- nasal cannula 2 -- --    01/04/22 2000 95 -- -- -- --    01/04/22 1900 97 -- -- -- --    01/04/22 1800 98 -- -- -- --    01/04/22 1600 97 -- -- -- --    01/04/22 1500 97 -- -- -- --    01/04/22 1430 97 -- -- -- --    01/04/22 1400 97 -- -- -- --    01/04/22 1330 97 -- -- -- --    01/04/22 1300 97 -- -- -- --    01/04/22 1200 97 -- -- -- --    01/04/22 1130 96 -- -- -- --    01/04/22 1100 96 -- -- -- --    01/04/22 1000 97 -- -- -- --    01/04/22 0900 97 -- -- -- --    01/04/22 0815 -- nasal cannula 3 -- --    01/04/22 0800 97 -- -- -- --    01/04/22 0627 97 -- -- -- --    01/04/22 0500 98 -- -- -- --    01/04/22 0400 100 nasal cannula 3 -- --    01/04/22 0300 94 -- -- -- --    01/04/22 0200 98 nasal cannula 3 -- --    01/04/22 0100 93 -- -- -- --    01/04/22 0000 98 nasal cannula 3 -- --          Current Facility-Administered Medications   Medication Dose Route Frequency Provider Last Rate Last Admin   • albumin human 25 % IV SOLN 25 g  25 g Intravenous BID Efrain Arevalo MD   25 g at 01/05/22 1006   • ALPRAZolam (XANAX) tablet 0.5 mg  0.5 mg Oral TID PRN Arie Lockhart MD   0.5 mg at 01/03/22 0115   • amiodarone (PACERONE) tablet 200 mg  200 mg Oral BID With Meals Griselda Cummins APRN   200 mg at 01/05/22 1006   • apixaban (ELIQUIS) tablet 5 mg  5 mg Oral Q12H Celso Méndez MD   5 mg at 01/05/22 0812   • DOBUTamine (DOBUTREX) 1 mg/mL infusion  2.5 mcg/kg/min Intravenous Titrated Griselda Cummins APRN 14.54 mL/hr at 01/05/22 1004 2.5 mcg/kg/min at 01/05/22 1004   • lactated ringers bolus 250 mL  250 mL Intravenous Once Arie Lockhart MD   Held at 01/04/22 0739   • Magnesium Sulfate 2 gram Bolus, followed by 8 gram infusion (total Mg dose 10 grams)- Mg less than or equal to 1mg/dL  2 g Intravenous PRN Efrain Arevalo MD        Or   • Magnesium Sulfate 2 gram / 50mL Infusion (GIVE X 3 BAGS TO EQUAL 6GM TOTAL DOSE) - Mg 1.1 -  1.5 mg/dl  2 g Intravenous PRN Efrain Arevalo MD        Or   • Magnesium Sulfate 4 gram infusion- Mg 1.6-1.9 mg/dL  4 g Intravenous PRN Efrain Arevalo MD       • metoprolol succinate XL (TOPROL-XL) 24 hr tablet 25 mg  25 mg Oral Q24H Altagracia Mantilla MD   25 mg at 22 08   • morphine injection 2 mg  2 mg Intravenous Q2H PRN Celso Méndez MD   2 mg at 225   • ondansetron (ZOFRAN) injection 4 mg  4 mg Intravenous Q6H PRN Celso Méndez MD   4 mg at 22 0929   • pantoprazole (PROTONIX) injection 40 mg  40 mg Intravenous Q AM Celso Méndez MD   40 mg at 22 0546   • potassium chloride (KLOR-CON) packet 40 mEq  40 mEq Oral PRN Efrain Arevalo MD       • potassium chloride (MICRO-K) CR capsule 40 mEq  40 mEq Oral PRN Efrain Arevalo MD   40 mEq at 22   • prochlorperazine (COMPAZINE) injection 10 mg  10 mg Intravenous Q4H PRN Arik Haines MD   10 mg at 22    Or   • prochlorperazine (COMPAZINE) tablet 5 mg  5 mg Oral Q6H PRN Arik Haines MD        Or   • prochlorperazine (COMPAZINE) suppository 25 mg  25 mg Rectal Q12H PRN Arik Haines MD       • sodium chloride 0.9 % flush 10 mL  10 mL Intravenous PRN Celso Méndez MD       • sodium chloride 0.9 % flush 10 mL  10 mL Intravenous Q12H Celso Méndez MD   10 mL at 22 0812   • sodium chloride 0.9 % flush 10 mL  10 mL Intravenous PRN Celso Méndez MD   10 mL at 22 1805   • venlafaxine (EFFEXOR) tablet 75 mg  75 mg Oral BID Celso Méndez MD   75 mg at 22 0812        Physician Progress Notes (last 24 hours)      Becka Aparicio MD at 22 1028          [facility]  [facility] [place]. [address]  T - [phone]  F - [phone]     Progress Note          PATIENT  DEMOGRAPHICS   PATIENT NAME: Kd Lingurmeet Campa                      PHYSICIAN: Becka Aparicio MD  : 1960  MRN: [id number]   LOS: 5 days    Patient Care Team:  Estella Gutierrez  "MD MARJ as PCP - General (Family Medicine)  Subjective   SUBJECTIVE   Comfortable. No orthopnea         Objective   OBJECTIVE   Vital Signs  Temp:  [97.4 °F (36.3 °C)-97.6 °F (36.4 °C)] 97.5 °F (36.4 °C)  Heart Rate:  [64-74] 69  Resp:  [14-21] 17  BP: ()/(60-85) 123/71    Flowsheet Rows      First Filed Value   Admission Height 190.5 cm (75\") Documented at 12/31/2021 1602   Admission Weight 87.1 kg (192 lb 0.3 oz) Documented at 12/31/2021 1559           I/O last 3 completed shifts:  In: 1556 [P.O.:420; I.V.:936; IV Piggyback:200]  Out: 1225 [Urine:1225]    PHYSICAL EXAM    Physical Exam  Constitutional:       Appearance: He is well-developed.   HENT:      Head: Normocephalic.   Eyes:      Pupils: Pupils are equal, round, and reactive to light.   Cardiovascular:      Rate and Rhythm: Normal rate and regular rhythm.      Heart sounds: Normal heart sounds.   Pulmonary:      Effort: Pulmonary effort is normal.      Breath sounds: Normal breath sounds.   Abdominal:      General: Bowel sounds are normal.      Palpations: Abdomen is soft.   Musculoskeletal:         General: No swelling.   Skin:     Coloration: Skin is not jaundiced.   Neurological:      General: No focal deficit present.      Mental Status: He is alert and oriented to person, place, and time.         RESULTS   Results Review:    Results from last 7 days   Lab Units 01/04/22  0725 01/03/22  0612 01/02/22  1348 01/02/22  0313 12/31/21  0849 12/30/21  1100 12/30/21  1100   SODIUM mmol/L 135* 140 139   < > 139   < > 135*   POTASSIUM mmol/L 4.8 3.5 3.7   < > 3.8   < > 3.8   CHLORIDE mmol/L 98 101 101   < > 103   < > 101   CO2 mmol/L 24.0 26.0 22.0   < > 24.0   < > 20.0*   BUN mg/dL 36* 30* 29*   < > 22   < > 26*   CREATININE mg/dL 2.61* 1.60* 1.84*   < > 1.12   < > 1.01   CALCIUM mg/dL 9.3 8.9 9.1   < > 9.1   < > 9.1   BILIRUBIN mg/dL 1.3*  --   --   --  1.3*  --  0.9   ALK PHOS U/L 66  --   --   --  87  --  85   ALT (SGPT) U/L 437*  --   --   --  16  --  " 17   AST (SGOT) U/L 842*  --   --   --  23  --  22   GLUCOSE mg/dL 154* 103* 202*   < > 142*   < > 151*    < > = values in this interval not displayed.       Estimated Creatinine Clearance: 36.4 mL/min (A) (by C-G formula based on SCr of 2.61 mg/dL (H)).    Results from last 7 days   Lab Units 01/02/22  0313   MAGNESIUM mg/dL 1.7             Results from last 7 days   Lab Units 01/05/22  0956 01/04/22  0726 01/02/22  1348 12/31/21  0849 12/30/21  1101   WBC 10*3/mm3 8.41 12.55* 10.04 7.43 7.38   HEMOGLOBIN g/dL 13.0 14.1 15.1 15.1 15.3   PLATELETS 10*3/mm3 139* 177 206 191 192       Results from last 7 days   Lab Units 01/03/22  0612   INR  1.79*         Imaging Results (Last 24 Hours)     Procedure Component Value Units Date/Time    US Renal Bilateral [[id number]] Collected: 01/04/22 1600     Updated: 01/04/22 1651    Narrative:      Ultrasound renal complete    HISTORY:  Worsening renal function. Edema.    Ultrasound examination of the kidneys and urinary bladder was  performed.    COMPARISON: None.     FINDINGS:  The right kidney measures 11.2 cm in length by 5.10 cm x 4.78 cm  transverse.  The left kidney measures 10.4 cm in length by 5.00 cm x 4.34 cm  transverse.  The kidneys are of normal echotexture.  No hydronephrosis.  No solid or cystic renal mass.    Images of the urinary bladder are unremarkable.    Minimal ascites.  Left pleural effusion.      Impression:      CONCLUSION:  Normal kidneys.  Minimal ascites.  Left pleural effusion.    [id number]    Electronically signed by:  Lester Blackburn MD  1/4/2022 4:50 PM [employer]  Workstation: [employer]           MEDICATIONS    albumin human, 25 g, Intravenous, BID  amiodarone, 200 mg, Oral, BID With Meals  apixaban, 5 mg, Oral, Q12H  lactated ringers, 250 mL, Intravenous, Once  losartan, 25 mg, Oral, Q24H  metoprolol succinate XL, 25 mg, Oral, Q24H  pantoprazole, 40 mg, Intravenous, Q AM  sodium chloride, 10 mL, Intravenous, Q12H  spironolactone, 12.5 mg, Oral,  Daily  tamsulosin, 0.4 mg, Oral, Nightly  venlafaxine, 75 mg, Oral, BID      DOBUTamine, 2.5 mcg/kg/min, Last Rate: 2.5 mcg/kg/min (01/05/22 1004)        Assessment/Plan   ASSESSMENT / PLAN      Paroxysmal atrial fibrillation with rapid ventricular response (HCC)    Acute on chronic systolic CHF (congestive heart failure) ([facility])    Moderate malnutrition (CMS/HCC)    Hypotension       1.  CAROLYN- Baseline creatinine around 1.1, creatinine now up to 2.6.  Etiology is likely multifactorial secondary to dye exposure from CTA, diuretics, as well as intermittent hypotension.  At present we will hold his diuretics, continue albumin 3 times daily.  Hold lisinopril, Flomax, spironolactone (due to low bp , carolyn).   u/s renal no hydro . fena is consistent with pre renal pictures. He has elevated jvd and will need diuretic soon . Check lab today    2.  A. fib with RVR- cardiology following, improved following cardioversion on 1/3/2022. Has prior ablation procedure     3.  Acute on chronic CHF- cardiology following, improved, continue dobutamine     4.  Intermittent hypotension- med changes as above                   This document has been electronically signed by Becka Aparicio MD on January 5, 2022 10:28 CST           Electronically signed by Becka Aparicio MD at 01/05/22 1036     Efrain Arevalo MD at 01/04/22 1235              [facility] Medicine Services  INPATIENT PROGRESS NOTE    Length of Stay: 4  Date of Admission: 12/31/2021  Primary Care Physician: Estella Gutierrez MD    Subjective   Chief Complaint: Palpitations/dyspnea  HPI: Doing well currently but has had difficulties with SOA requiring further diuresis.  Patient denies any current concerns. BP has been intermittently low.     Review of Systems   Constitutional: Negative for chills and fever.   HENT: Negative for congestion.    Respiratory: Positive for shortness of breath. Negative for cough and wheezing.    Cardiovascular:  Negative for chest pain and palpitations.   Gastrointestinal: Negative for abdominal pain, constipation, diarrhea and nausea.   Genitourinary: Negative.    Musculoskeletal: Negative.    Neurological: Negative.    Psychiatric/Behavioral: Negative.    All other systems reviewed and are negative.     All pertinent negatives and positives are as above. All other systems have been reviewed and are negative unless otherwise stated.     Objective    Temp:  [96.7 °F (35.9 °C)-97.8 °F (36.6 °C)] 96.7 °F (35.9 °C)  Heart Rate:  [] 68  Resp:  [15-22] 17  BP: ()/(54-84) 102/70    Physical Exam  Constitutional:       General: He is not in acute distress.     Appearance: He is not toxic-appearing.   HENT:      Head: Normocephalic and atraumatic.      Right Ear: External ear normal.      Left Ear: External ear normal.      Nose: Nose normal.      Mouth/Throat:      Mouth: Mucous membranes are moist.      Pharynx: Oropharynx is clear.   Eyes:      Conjunctiva/sclera: Conjunctivae normal.   Cardiovascular:      Rate and Rhythm: Normal rate and regular rhythm.      Pulses: Normal pulses.      Heart sounds: Normal heart sounds.   Pulmonary:      Effort: Pulmonary effort is normal. No respiratory distress.      Breath sounds: Normal breath sounds.   Abdominal:      General: Bowel sounds are normal.      Palpations: Abdomen is soft.      Tenderness: There is no abdominal tenderness.   Musculoskeletal:         General: No swelling.   Skin:     General: Skin is warm and dry.      Capillary Refill: Capillary refill takes less than 2 seconds.   Neurological:      General: No focal deficit present.      Mental Status: He is alert and oriented to person, place, and time. Mental status is at baseline.   Psychiatric:         Behavior: Behavior normal.         Thought Content: Thought content normal.         Results Review:  I have reviewed the labs, radiology results, and diagnostic studies.    Laboratory Data:   Results from last 7  days   Lab Units 01/04/22  0725 01/03/22  0612 01/02/22  1348 01/02/22  0313 12/31/21  0849 12/30/21  1100 12/30/21  1100   SODIUM mmol/L 135* 140 139   < > 139   < > 135*   POTASSIUM mmol/L 4.8 3.5 3.7   < > 3.8   < > 3.8   CHLORIDE mmol/L 98 101 101   < > 103   < > 101   CO2 mmol/L 24.0 26.0 22.0   < > 24.0   < > 20.0*   BUN mg/dL 36* 30* 29*   < > 22   < > 26*   CREATININE mg/dL 2.61* 1.60* 1.84*   < > 1.12   < > 1.01   GLUCOSE mg/dL 154* 103* 202*   < > 142*   < > 151*   CALCIUM mg/dL 9.3 8.9 9.1   < > 9.1   < > 9.1   BILIRUBIN mg/dL 1.3*  --   --   --  1.3*  --  0.9   ALK PHOS U/L 66  --   --   --  87  --  85   ALT (SGPT) U/L 437*  --   --   --  16  --  17   AST (SGOT) U/L 842*  --   --   --  23  --  22   ANION GAP mmol/L 13.0 13.0 16.0*   < > 12.0   < > 14.0    < > = values in this interval not displayed.     Estimated Creatinine Clearance: 39.7 mL/min (A) (by C-G formula based on SCr of 2.61 mg/dL (H)).  Results from last 7 days   Lab Units 01/02/22  0313   MAGNESIUM mg/dL 1.7         Results from last 7 days   Lab Units 01/04/22  0726 01/02/22  1348 12/31/21  0849 12/30/21  1101   WBC 10*3/mm3 12.55* 10.04 7.43 7.38   HEMOGLOBIN g/dL 14.1 15.1 15.1 15.3   HEMATOCRIT % 42.1 47.8 44.9 45.6   PLATELETS 10*3/mm3 177 206 191 192     Results from last 7 days   Lab Units 01/03/22  0612   INR  1.79*       Culture Data:   No results found for: BLOODCX  No results found for: URINECX  No results found for: RESPCX  No results found for: WOUNDCX  No results found for: STOOLCX  No components found for: BODYFLD    Radiology Data:   Imaging Results (Last 24 Hours)     ** No results found for the last 24 hours. **          I have reviewed the patient's current medications.     Assessment/Plan     Principal Problem:    Paroxysmal atrial fibrillation with rapid ventricular response (HCC)  Active Problems:    Acute on chronic systolic CHF (congestive heart failure) (HCC)    Moderate malnutrition (CMS/HCC)     Hypotension    Plan:  -Appreciate cardiology's assistance, s/p cardioversion on 1/3/2022.   -Continue with amiodarone   -Continue fluid restriction  -Discussed with Cardiology, started on Dobutamine to help with BP and diuresis.    -Xanax available as well for anxiety as this is likely contributing to his shortness of breath.   -Renal function appears to be gradually worsening, likely related to intermittent low BP and possible diuresis. Will check renal US and UA.  Nephrology consult also placed.    -DVT prophylaxis with Eliquis  -CODE STATUS: Full    I confirmed that the patient's Advance Care Plan is present, code status is documented, or surrogate decision maker is listed in the patient's medical record.     Discharge Planning: In process, continue CCU care for now.     Efrain Arevalo MD      Electronically signed by Efrain Arevalo MD at 01/04/22 1239     Altagracia Mantilla MD at 01/04/22 1049           LOS: 4 days   Patient Care Team:  Estella Gutierrez MD as PCP - General (Family Medicine)    Chief Complaint:  atrial fibrillation with rapid ventricular rate and congestive heart failure due to reduced left ventricular systolic function  Patient is s/p electrical cardioversion maintained sinus rhythm.  Will discontinue IV amiodarone.  Blood pressures are marginal.  Patient clinically heart failure.  We will start the patient dobutamine received the diuretics.  IV albumin can be utilized and if the blood pressures more than 140 mg Lasix can be content contemplated discussed with the nurse  61 years old patient with background history of paroxysmal atrial fibrillation s/p EP study and A. fib ablation in the [place] area more than 3 years ago and a history of congestive heart failure have some weak muscle was on Lasix and blood pressure medicine discontinued.  The patient presented for evaluation increasing shortness of breath functional class III no limited orthopnea or PND noted in atrial fibrillation with rapid  ventricular rate and clinically patient was in congestive heart failure with elevated BNP mild lower extremity edema and chest x-ray bilateral pleural effusion no evidence of pulmonary embolism on CT pulmonary angiogram.  Troponin is normal.  Patient received 80 mg Lasix yesterday with a significant provement in shortness of breath and good diuresis.  He has marginal hemodynamic and we are unable to uptitrate beta-blocker and to start Aldactone or ACE/ARB at this stage.      Review of Systems:     Constitution:  Denies any fatigue, fever or chills.  Positive for activity change positive for fatigue    HENT:  Denies any headache, hearing impairment.    Eyes:  Denies any blurring of vision     Cardiovascular:  As per history of present illness.     Respiratory: Exertional dyspnea functional class III       Gastrointestinal:  No nausea, vomiting, or melena.    Extremity: No edema, positive pulses    Objective     Current Facility-Administered Medications   Medication Dose Route Frequency Provider Last Rate Last Admin   • ALPRAZolam (XANAX) tablet 0.5 mg  0.5 mg Oral TID PRN Arie Lockhart MD   0.5 mg at 01/03/22 2335   • amiodarone 360 mg in 200 mL D5W infusion  0.5 mg/min Intravenous Continuous Altagracia Mantilla MD 16.67 mL/hr at 01/04/22 0205 0.5 mg/min at 01/04/22 0205   • apixaban (ELIQUIS) tablet 5 mg  5 mg Oral Q12H Celso Méndez MD   5 mg at 01/04/22 0821   • DOBUTamine (DOBUTREX) 1 mg/mL infusion  5 mcg/kg/min Intravenous Titrated Altagracia Mantilla MD 29.1 mL/hr at 01/04/22 0930 5 mcg/kg/min at 01/04/22 0930   • furosemide (LASIX) injection 40 mg  40 mg Intravenous Once Altagracia Mantilla MD       • lactated ringers bolus 250 mL  250 mL Intravenous Once Arie Lockhart MD   Held at 01/04/22 0739   • losartan (COZAAR) tablet 25 mg  25 mg Oral Q24H Altagracia Mantilla MD   25 mg at 01/03/22 1336   • Magnesium Sulfate 2 gram Bolus, followed by 8 gram infusion (total Mg dose 10 grams)- Mg less than or  equal to 1mg/dL  2 g Intravenous PRN Efrain Arevalo MD        Or   • Magnesium Sulfate 2 gram / 50mL Infusion (GIVE X 3 BAGS TO EQUAL 6GM TOTAL DOSE) - Mg 1.1 - 1.5 mg/dl  2 g Intravenous PRN Efrain Arevalo MD        Or   • Magnesium Sulfate 4 gram infusion- Mg 1.6-1.9 mg/dL  4 g Intravenous PRN Efrain Arevalo MD       • metoprolol succinate XL (TOPROL-XL) 24 hr tablet 25 mg  25 mg Oral Q24H Altagracia Mantilla MD   25 mg at 01/04/22 0821   • morphine injection 2 mg  2 mg Intravenous Q2H PRN Celso Méndez MD   2 mg at 01/03/22 2225   • ondansetron (ZOFRAN) injection 4 mg  4 mg Intravenous Q6H PRN Celso Méndez MD   4 mg at 01/04/22 0929   • pantoprazole (PROTONIX) injection 40 mg  40 mg Intravenous Q AM Celso Méndez MD   40 mg at 01/04/22 0509   • potassium chloride (KLOR-CON) packet 40 mEq  40 mEq Oral PRN Efrain Arevalo MD       • potassium chloride (MICRO-K) CR capsule 40 mEq  40 mEq Oral PRN Efrain Arevalo MD   40 mEq at 01/03/22 2046   • prochlorperazine (COMPAZINE) injection 10 mg  10 mg Intravenous Q4H PRN Arik Haines MD   10 mg at 01/03/22 2202    Or   • prochlorperazine (COMPAZINE) tablet 5 mg  5 mg Oral Q6H PRN Arik Haines MD        Or   • prochlorperazine (COMPAZINE) suppository 25 mg  25 mg Rectal Q12H PRN Arik Haines MD       • sodium chloride 0.9 % flush 10 mL  10 mL Intravenous PRN Celso Méndez MD       • sodium chloride 0.9 % flush 10 mL  10 mL Intravenous Q12H Celso Méndez MD   10 mL at 01/04/22 0821   • sodium chloride 0.9 % flush 10 mL  10 mL Intravenous PRN Celso Méndez MD   10 mL at 01/03/22 1805   • spironolactone (ALDACTONE) half tablet 12.5 mg  12.5 mg Oral Daily Altagracia Mantilla MD   12.5 mg at 01/04/22 0821   • tamsulosin (FLOMAX) 24 hr capsule 0.4 mg  0.4 mg Oral Nightly Celso Méndez MD   0.4 mg at 01/03/22 2046   • venlafaxine (EFFEXOR) tablet 75 mg  75 mg Oral BID Celso Méndez MD   75 mg at 01/04/22 0821       Vital Sign Min/Max for last 24  "hours  Temp  Min: 96.7 °F (35.9 °C)  Max: 97.8 °F (36.6 °C)   BP  Min: 83/54  Max: 136/79   Pulse  Min: 67  Max: 106   Resp  Min: 15  Max: 22   SpO2  Min: 93 %  Max: 100 %   Flow (L/min)  Min: 2  Max: 3   Weight  Min: 96.9 kg (213 lb 10 oz)  Max: 96.9 kg (213 lb 10 oz)     Flowsheet Rows      First Filed Value   Admission Height 190.5 cm (75\") Documented at 12/31/2021 1602   Admission Weight 87.1 kg (192 lb 0.3 oz) Documented at 12/31/2021 1559            Intake/Output Summary (Last 24 hours) at 1/4/2022 1049  Last data filed at 1/4/2022 0400  Gross per 24 hour   Intake 1095 ml   Output 450 ml   Net 645 ml       Physical Exam:  Neck: Supple.  Positive mild JVD  Chest: Creased bilateral air entry with mild rales  Cardiovascular system:  Regular rate and rhythm, no murmurs.  Abdomen: Soft, no tenderness, bowel sounds present, no hepatosplenomegaly.  CNS: Alert, oriented to place and time.  No motor or sensory deficit.  Cranial nerves intact.  Musculoskeletal: No deformity of the back or spine.  Extremities:  No edema.  Pulses equal on both sides.     Results Review:   I reviewed the patient's new clinical results.  Lab Results   Component Value Date    WBC 12.55 (H) 01/04/2022    HGB 14.1 01/04/2022    HCT 42.1 01/04/2022    MCV 93.8 01/04/2022     01/04/2022     Lab Results   Component Value Date    GLUCOSE 154 (H) 01/04/2022    BUN 36 (H) 01/04/2022    CREATININE 2.61 (H) 01/04/2022    EGFRIFNONA 25 (L) 01/04/2022    BCR 13.8 01/04/2022    CO2 24.0 01/04/2022    CALCIUM 9.3 01/04/2022    ALBUMIN 3.80 01/04/2022     (H) 01/04/2022     (H) 01/04/2022     No results found for: TSH  Lab Results   Component Value Date    INR 1.79 (H) 01/03/2022    PROTIME 20.4 (H) 01/03/2022     No results found for: PTT    EKG:     Telemetry:    Ejection Fraction  No results found for: EF    Echo EF Estimated  Lab Results   Component Value Date    ECHOEFEST 22 01/03/2022         Present on Admission:  • Paroxysmal " atrial fibrillation with rapid ventricular response (HCC)  • Acute on chronic systolic CHF (congestive heart failure) (HCC)  • Moderate malnutrition (CMS/HCC)  • Hypotension    Assessment/Plan   #1 atrial fibrillation with rapid ventricular      Continue oral amiodarone IV amiodarone was discontinued patient remained in sinus rhythm after electrical cardio     #2 congestive heart failure possible acute on chronic     Marginal hemodynamic clinically is in heart failure.  We will start the patient dobutamine he received low-dose diuretics.  Based on the blood pressure and if needed albumin can be contemplated.  We will give 40 mg Lasix for the blood pressures is more than 100 systolic.  Started on very low-dose of beta-blocker losartan and Aldactone.  Will get congestive heart failure consultation       Restrict 1000 fluid     Sodium 2 g to  Patient was counseled educated avoid nonsteroid and drink        #3 moderate mitral tricuspid regurgitations will reassess after electrical cardioversions as an outpatient    Altagracia Mantilla MD  22  10:49 CST      Part of this note may be an electronic transcription/translation of spoken language to printed text using the Dragon Dictation system.        Electronically signed by Altagracia Mantilla MD at 22 1056       Medical Student Notes (last 24 hours)  Notes from 22 1036 through 22 1036   No notes of this type exist for this encounter.            Consult Notes (last 24 hours)      Becka Aparicio MD at 22 1243      Consult Orders    1. Inpatient Nephrology Consult [[id number]] ordered by Efrain Arevalo MD at 22 1126               [facility]  [facility] [place]. [address]  T - [phone]  F - [phone]     Consultation         PATIENT  DEMOGRAPHICS   PATIENT NAME: Kd Lingurmeet Campa                      PHYSICIAN: GERALD Bryant  : 1960  MRN: [id number]    Subjective   SUBJECTIVE   Referring  "Provider: Dr. Arevalo  Reason for Consultation: CAROLYN  History of present illness: This is a 61-year-old male with a past medical history significant for GERD, depression, CHF, BPH, atrial fibrillation who presented to the hospital on 31 December with complaints of shortness of breath and palpitations.  He was found to have atrial fibrillation with RVR as well as symptoms of systolic heart failure and was admitted for further treatment.  Cardiology was consulted and the patient was diuresed effectively with improvement in symptoms.  He has also been treated with dobutamine infusion.  He has had intermittent low blood pressure as well as work-up for possible PE which was negative.  Unfortunately he now has worsening renal function and nephrology is consulted to help manage CAROLYN.    Past Medical History:   Diagnosis Date   • Atrial fibrillation (HCC)    • BPH (benign prostatic hyperplasia)    • Chronic bronchitis (HCC)    • Chronic systolic heart failure (HCC)    • Depression    • GERD (gastroesophageal reflux disease)      Past Surgical History:   Procedure Laterality Date   • CARDIAC ELECTROPHYSIOLOGY STUDY AND ABLATION     • CARDIOVERSION       Family History   Problem Relation Age of Onset   • Hypertension Mother      Social History     Tobacco Use   • Smoking status: Current Some Day Smoker   • Smokeless tobacco: Never Used   Substance Use Topics   • Alcohol use: Not Currently   • Drug use: Not Currently     Allergies:  Patient has no known allergies.     REVIEW OF SYSTEMS    Review of Systems   All other systems reviewed and are negative.      Objective   OBJECTIVE   Vital Signs  Temp:  [96.7 °F (35.9 °C)-97.8 °F (36.6 °C)] 96.7 °F (35.9 °C)  Heart Rate:  [] 68  Resp:  [15-22] 17  BP: ()/(54-84) 102/70    Flowsheet Rows        First Filed Value   Admission Height 190.5 cm (75\") Documented at 12/31/2021 1602   Admission Weight 87.1 kg (192 lb 0.3 oz) Documented at 12/31/2021 1559             I/O last 3 " completed shifts:  In: 1749.3 [P.O.:840; I.V.:709.3; IV Piggyback:200]  Out: 725 [Urine:725]    PHYSICAL EXAM    Physical Exam  Constitutional:       Appearance: He is well-developed.   HENT:      Head: Normocephalic and atraumatic.   Eyes:      Conjunctiva/sclera: Conjunctivae normal.      Pupils: Pupils are equal, round, and reactive to light.   Cardiovascular:      Rate and Rhythm: Normal rate and regular rhythm.   Pulmonary:      Effort: Pulmonary effort is normal.      Breath sounds: Normal breath sounds.   Abdominal:      Palpations: Abdomen is soft.   Musculoskeletal:      Cervical back: Neck supple.      Right lower leg: Edema present.      Left lower leg: Edema present.   Skin:     General: Skin is warm and dry.   Neurological:      Mental Status: He is alert and oriented to person, place, and time.   Psychiatric:         Mood and Affect: Mood normal.         Behavior: Behavior normal.         RESULTS   Results Review:    Results from last 7 days   Lab Units 01/04/22  0725 01/03/22  0612 01/02/22  1348 01/02/22  0313 12/31/21  0849 12/30/21  1100 12/30/21  1100   SODIUM mmol/L 135* 140 139   < > 139   < > 135*   POTASSIUM mmol/L 4.8 3.5 3.7   < > 3.8   < > 3.8   CHLORIDE mmol/L 98 101 101   < > 103   < > 101   CO2 mmol/L 24.0 26.0 22.0   < > 24.0   < > 20.0*   BUN mg/dL 36* 30* 29*   < > 22   < > 26*   CREATININE mg/dL 2.61* 1.60* 1.84*   < > 1.12   < > 1.01   CALCIUM mg/dL 9.3 8.9 9.1   < > 9.1   < > 9.1   BILIRUBIN mg/dL 1.3*  --   --   --  1.3*  --  0.9   ALK PHOS U/L 66  --   --   --  87  --  85   ALT (SGPT) U/L 437*  --   --   --  16  --  17   AST (SGOT) U/L 842*  --   --   --  23  --  22   GLUCOSE mg/dL 154* 103* 202*   < > 142*   < > 151*    < > = values in this interval not displayed.       Estimated Creatinine Clearance: 39.7 mL/min (A) (by C-G formula based on SCr of 2.61 mg/dL (H)).    Results from last 7 days   Lab Units 01/02/22  0313   MAGNESIUM mg/dL 1.7             Results from last 7 days    Lab Units 01/04/22  0726 01/02/22  1348 12/31/21  0849 12/30/21  1101   WBC 10*3/mm3 12.55* 10.04 7.43 7.38   HEMOGLOBIN g/dL 14.1 15.1 15.1 15.3   PLATELETS 10*3/mm3 177 206 191 192       Results from last 7 days   Lab Units 01/03/22  0612   INR  1.79*        MEDICATIONS    albumin human, 25 g, Intravenous, BID  apixaban, 5 mg, Oral, Q12H  furosemide, 40 mg, Intravenous, Once  lactated ringers, 250 mL, Intravenous, Once  losartan, 25 mg, Oral, Q24H  metoprolol succinate XL, 25 mg, Oral, Q24H  pantoprazole, 40 mg, Intravenous, Q AM  sodium chloride, 10 mL, Intravenous, Q12H  spironolactone, 12.5 mg, Oral, Daily  tamsulosin, 0.4 mg, Oral, Nightly  venlafaxine, 75 mg, Oral, BID      amiodarone, 0.5 mg/min, Last Rate: 0.5 mg/min (01/04/22 0205)  DOBUTamine, 5 mcg/kg/min, Last Rate: 5 mcg/kg/min (01/04/22 0930)      Medications Prior to Admission   Medication Sig Dispense Refill Last Dose   • aspirin 81 MG chewable tablet Chew 325 mg Daily.   Past Week at Unknown time   • omeprazole (priLOSEC) 40 MG capsule Take 40 mg by mouth Daily.   12/31/2021 at Unknown time   • tamsulosin (FLOMAX) 0.4 MG capsule 24 hr capsule Take 1 capsule by mouth Daily.   12/31/2021 at Unknown time   • venlafaxine (EFFEXOR) 75 MG tablet Take 75 mg by mouth 2 (Two) Times a Day.   12/31/2021 at Unknown time   • dilTIAZem XR (DILACOR XR) 180 MG 24 hr capsule Take 1 capsule by mouth Daily for 14 days. 14 capsule 0      Assessment/Plan   ASSESSMENT / PLAN      Paroxysmal atrial fibrillation with rapid ventricular response (HCC)    Acute on chronic systolic CHF (congestive heart failure) (HCC)    Moderate malnutrition (CMS/HCC)    Hypotension    1.  CAROLYN- Baseline creatinine around 1.1, creatinine now up to 2.6.  Etiology is likely multifactorial secondary to dye exposure from CTA, diuretics, as well as intermittent hypotension.  At present we will hold his diuretics, continue albumin 3 times daily.  Hold lisinopril, Flomax, spironolactone (due to  low bp , rosalva).   Agree with checking renal ultrasound and urine studies.    2.  A. fib with RVR- cardiology following, improved following cardioversion on 1/3/2022. Has prior ablation procedure    3.  Acute on chronic CHF- cardiology following, improved, continue dobutamine    4.  Intermittent hypotension- med changes as above      Thank you for the consult, we will continue to follow the patient.         I discussed the patients findings and my recommendations with patient      This document has been electronically signed by GERALD Bryant on January 4, 2022 12:43 CST      For this patient encounter, I have reviewed the Nurse Practitioner's documentation, medical decision making, and treatment plan and personally spent time with the patient.              Electronically signed by Becka Aparicio MD at 01/04/22 2369

## 2022-01-05 NOTE — PROGRESS NOTES
"[facility]  [address] [place] [address]. [address]  T - [phone]  F - [phone]     Progress Note          PATIENT  DEMOGRAPHICS   PATIENT NAME: Kd Campa                      PHYSICIAN: Becka Aparicio MD  : 1960  MRN: [id number]   LOS: 5 days    Patient Care Team:  Estella Gutierrez MD as PCP - General (Family Medicine)  Subjective   SUBJECTIVE   Comfortable. No orthopnea         Objective   OBJECTIVE   Vital Signs  Temp:  [97.4 °F (36.3 °C)-97.6 °F (36.4 °C)] 97.5 °F (36.4 °C)  Heart Rate:  [64-74] 69  Resp:  [14-21] 17  BP: ()/(60-85) 123/71    Flowsheet Rows      First Filed Value   Admission Height 190.5 cm (75\") Documented at 2021 1602   Admission Weight 87.1 kg (192 lb 0.3 oz) Documented at 2021 1559           I/O last 3 completed shifts:  In: 1556 [P.O.:420; I.V.:936; IV Piggyback:200]  Out: 1225 [Urine:1225]    PHYSICAL EXAM    Physical Exam  Constitutional:       Appearance: He is well-developed.   HENT:      Head: Normocephalic.   Eyes:      Pupils: Pupils are equal, round, and reactive to light.   Cardiovascular:      Rate and Rhythm: Normal rate and regular rhythm.      Heart sounds: Normal heart sounds.   Pulmonary:      Effort: Pulmonary effort is normal.      Breath sounds: Normal breath sounds.   Abdominal:      General: Bowel sounds are normal.      Palpations: Abdomen is soft.   Musculoskeletal:         General: No swelling.   Skin:     Coloration: Skin is not jaundiced.   Neurological:      General: No focal deficit present.      Mental Status: He is alert and oriented to person, place, and time.         RESULTS   Results Review:    Results from last 7 days   Lab Units 22  0725 22  0612 22  1348 22  0313 21  0849 21  1100 21  1100   SODIUM mmol/L 135* 140 139   < > 139   < > 135*   POTASSIUM mmol/L 4.8 3.5 3.7   < > 3.8   < > 3.8   CHLORIDE mmol/L 98 101 101   < > 103   < > 101   CO2 " mmol/L 24.0 26.0 22.0   < > 24.0   < > 20.0*   BUN mg/dL 36* 30* 29*   < > 22   < > 26*   CREATININE mg/dL 2.61* 1.60* 1.84*   < > 1.12   < > 1.01   CALCIUM mg/dL 9.3 8.9 9.1   < > 9.1   < > 9.1   BILIRUBIN mg/dL 1.3*  --   --   --  1.3*  --  0.9   ALK PHOS U/L 66  --   --   --  87  --  85   ALT (SGPT) U/L 437*  --   --   --  16  --  17   AST (SGOT) U/L 842*  --   --   --  23  --  22   GLUCOSE mg/dL 154* 103* 202*   < > 142*   < > 151*    < > = values in this interval not displayed.       Estimated Creatinine Clearance: 36.4 mL/min (A) (by C-G formula based on SCr of 2.61 mg/dL (H)).    Results from last 7 days   Lab Units 01/02/22  0313   MAGNESIUM mg/dL 1.7             Results from last 7 days   Lab Units 01/05/22  0956 01/04/22  0726 01/02/22  1348 12/31/21  0849 12/30/21  1101   WBC 10*3/mm3 8.41 12.55* 10.04 7.43 7.38   HEMOGLOBIN g/dL 13.0 14.1 15.1 15.1 15.3   PLATELETS 10*3/mm3 139* 177 206 191 192       Results from last 7 days   Lab Units 01/03/22  0612   INR  1.79*         Imaging Results (Last 24 Hours)     Procedure Component Value Units Date/Time    US Renal Bilateral [[id number]] Collected: 01/04/22 1600     Updated: 01/04/22 1651    Narrative:      Ultrasound renal complete    HISTORY:  Worsening renal function. Edema.    Ultrasound examination of the kidneys and urinary bladder was  performed.    COMPARISON: None.     FINDINGS:  The right kidney measures 11.2 cm in length by 5.10 cm x 4.78 cm  transverse.  The left kidney measures 10.4 cm in length by 5.00 cm x 4.34 cm  transverse.  The kidneys are of normal echotexture.  No hydronephrosis.  No solid or cystic renal mass.    Images of the urinary bladder are unremarkable.    Minimal ascites.  Left pleural effusion.      Impression:      CONCLUSION:  Normal kidneys.  Minimal ascites.  Left pleural effusion.    [id number]    Electronically signed by:  Lester Blackburn MD  1/4/2022 4:50 PM CST  Workstation: [id number]           MEDICATIONS    albumin human,  25 g, Intravenous, BID  amiodarone, 200 mg, Oral, BID With Meals  apixaban, 5 mg, Oral, Q12H  lactated ringers, 250 mL, Intravenous, Once  losartan, 25 mg, Oral, Q24H  metoprolol succinate XL, 25 mg, Oral, Q24H  pantoprazole, 40 mg, Intravenous, Q AM  sodium chloride, 10 mL, Intravenous, Q12H  spironolactone, 12.5 mg, Oral, Daily  tamsulosin, 0.4 mg, Oral, Nightly  venlafaxine, 75 mg, Oral, BID      DOBUTamine, 2.5 mcg/kg/min, Last Rate: 2.5 mcg/kg/min (01/05/22 1004)        Assessment/Plan   ASSESSMENT / PLAN      Paroxysmal atrial fibrillation with rapid ventricular response (HCC)    Acute on chronic systolic CHF (congestive heart failure) ([facility])    Moderate malnutrition (CMS/HCC)    Hypotension       1.  CAROLYN- Baseline creatinine around 1.1, creatinine now up to 2.6.  Etiology is likely multifactorial secondary to dye exposure from CTA, diuretics, as well as intermittent hypotension.  At present we will hold his diuretics, continue albumin 3 times daily.  Hold lisinopril, Flomax, spironolactone (due to low bp , carolyn).   u/s renal no hydro . fena is consistent with pre renal pictures. He has elevated jvd and will need diuretic soon . Check lab today    2.  A. fib with RVR- cardiology following, improved following cardioversion on 1/3/2022. Has prior ablation procedure     3.  Acute on chronic CHF- cardiology following, improved, continue dobutamine     4.  Intermittent hypotension- med changes as above                   This document has been electronically signed by Becka Aparicio MD on January 5, 2022 10:28 CST

## 2022-01-05 NOTE — PROGRESS NOTES
LOS: 5 days   Patient Care Team:  Estella Gutierrez MD as PCP - General (Family Medicine)    Chief Complaint: History of electrical cardioversion maintaining sinus rhythm.  Patient had episode of hypotension that was managed with albumin dobutamine.  Creatinine went up may be multifactorial renal output is appreciated recommend to hold Lasix and ACE/ARB  61 years old patient with background history of paroxysmal atrial fibrillation s/p EP study and A. fib ablation in the [place] area more than 3 years ago and a history of congestive heart failure have some weak muscle was on Lasix and blood pressure medicine discontinued.  The patient presented for evaluation increasing shortness of breath functional class III no limited orthopnea or PND noted in atrial fibrillation with rapid ventricular rate and clinically patient was in congestive heart failure with elevated BNP mild lower extremity edema and chest x-ray bilateral pleural effusion no evidence of pulmonary embolism on CT pulmonary angiogram.  Troponin is normal.  Patient received 80 mg Lasix yesterday with a significant provement in shortness of breath and good diuresis.  He has marginal hemodynamic and we are unable to uptitrate beta-blocker and to start Aldactone or ACE/ARB at this stage.      Review of Systems:     Constitution:  Denies any fatigue, fever or chills.  Positive for activity change positive for fatigue    HENT:  Denies any headache, hearing impairment.    Eyes:  Denies any blurring of vision     Cardiovascular:  As per history of present illness.     Respiratory: Exertional dyspnea functional class III       Gastrointestinal:  No nausea, vomiting, or melena.    Extremity: No edema, positive pulses    Objective     Current Facility-Administered Medications   Medication Dose Route Frequency Provider Last Rate Last Admin   • albumin human 25 % IV SOLN 25 g  25 g Intravenous BID Efrain Arevalo MD   25 g at 01/05/22 1006   • ALPRAZolam (XANAX)  tablet 0.5 mg  0.5 mg Oral TID PRN Arie Lockhart MD   0.5 mg at 01/03/22 2335   • amiodarone (PACERONE) tablet 200 mg  200 mg Oral BID With Meals Griselda Cummins APRN   200 mg at 01/05/22 1006   • apixaban (ELIQUIS) tablet 5 mg  5 mg Oral Q12H Celso Méndez MD   5 mg at 01/05/22 0812   • DOBUTamine (DOBUTREX) 1 mg/mL infusion  2.5 mcg/kg/min Intravenous Titrated Griselda Cummins APRN 14.54 mL/hr at 01/05/22 1004 2.5 mcg/kg/min at 01/05/22 1004   • lactated ringers bolus 250 mL  250 mL Intravenous Once Arie Lockhart MD   Held at 01/04/22 0739   • Magnesium Sulfate 2 gram Bolus, followed by 8 gram infusion (total Mg dose 10 grams)- Mg less than or equal to 1mg/dL  2 g Intravenous PRN Efrain Arevalo MD        Or   • Magnesium Sulfate 2 gram / 50mL Infusion (GIVE X 3 BAGS TO EQUAL 6GM TOTAL DOSE) - Mg 1.1 - 1.5 mg/dl  2 g Intravenous PRN Efrain Arevalo MD        Or   • Magnesium Sulfate 4 gram infusion- Mg 1.6-1.9 mg/dL  4 g Intravenous PRN Efrain Arevalo MD       • metoprolol succinate XL (TOPROL-XL) 24 hr tablet 25 mg  25 mg Oral Q24H Altagracia Mantilla MD   25 mg at 01/05/22 0812   • morphine injection 2 mg  2 mg Intravenous Q2H PRN Celso Méndez MD   2 mg at 01/03/22 2225   • ondansetron (ZOFRAN) injection 4 mg  4 mg Intravenous Q6H PRN Celso Méndez MD   4 mg at 01/04/22 0929   • pantoprazole (PROTONIX) injection 40 mg  40 mg Intravenous Q AM Celso Méndez MD   40 mg at 01/05/22 0546   • potassium chloride (KLOR-CON) packet 40 mEq  40 mEq Oral PRN Efrain Arevalo MD       • potassium chloride (MICRO-K) CR capsule 40 mEq  40 mEq Oral PRN Efrain Arevalo MD   40 mEq at 01/03/22 2046   • prochlorperazine (COMPAZINE) injection 10 mg  10 mg Intravenous Q4H PRN Arik Haines MD   10 mg at 01/03/22 2202    Or   • prochlorperazine (COMPAZINE) tablet 5 mg  5 mg Oral Q6H PRN Arik Haines MD        Or   • prochlorperazine (COMPAZINE) suppository 25 mg  25 mg Rectal Q12H PRN Zaki  "Arik Amador MD       • sodium chloride 0.9 % flush 10 mL  10 mL Intravenous PRN Celso Méndez MD       • sodium chloride 0.9 % flush 10 mL  10 mL Intravenous Q12H Celso Méndez MD   10 mL at 01/05/22 0812   • sodium chloride 0.9 % flush 10 mL  10 mL Intravenous PRN Celso Méndez MD   10 mL at 01/03/22 1805   • venlafaxine (EFFEXOR) tablet 75 mg  75 mg Oral BID Celso Méndez MD   75 mg at 01/05/22 0812       Vital Sign Min/Max for last 24 hours  Temp  Min: 97.4 °F (36.3 °C)  Max: 97.6 °F (36.4 °C)   BP  Min: 90/60  Max: 143/77   Pulse  Min: 64  Max: 74   Resp  Min: 14  Max: 21   SpO2  Min: 92 %  Max: 98 %   Flow (L/min)  Min: 2  Max: 2   Weight  Min: 88.8 kg (195 lb 12.3 oz)  Max: 88.8 kg (195 lb 12.3 oz)     Flowsheet Rows      First Filed Value   Admission Height 190.5 cm (75\") Documented at 12/31/2021 1602   Admission Weight 87.1 kg (192 lb 0.3 oz) Documented at 12/31/2021 1559            Intake/Output Summary (Last 24 hours) at 1/5/2022 1249  Last data filed at 1/5/2022 0815  Gross per 24 hour   Intake 801 ml   Output 1300 ml   Net -499 ml       Physical Exam:  Neck: Supple.  Positive mild JVD  Chest: Creased bilateral air entry with mild rales  Cardiovascular system:  Regular rate and rhythm, no murmurs.  Abdomen: Soft, no tenderness, bowel sounds present, no hepatosplenomegaly.  CNS: Alert, oriented to place and time.  No motor or sensory deficit.  Cranial nerves intact.  Musculoskeletal: No deformity of the back or spine.  Extremities:  No edema.  Pulses equal on both sides.     Results Review:   I reviewed the patient's new clinical results.  Lab Results   Component Value Date    WBC 8.41 01/05/2022    HGB 13.0 01/05/2022    HCT 38.2 01/05/2022    MCV 92.9 01/05/2022     (L) 01/05/2022     Lab Results   Component Value Date    GLUCOSE 143 (H) 01/05/2022    BUN 38 (H) 01/05/2022    CREATININE 1.74 (H) 01/05/2022    EGFRIFNONA 40 (L) 01/05/2022    BCR 21.8 01/05/2022    CO2 25.0 01/05/2022    " CALCIUM 8.8 01/05/2022    ALBUMIN 3.90 01/05/2022     (H) 01/05/2022     (H) 01/05/2022     No results found for: TSH  Lab Results   Component Value Date    INR 1.79 (H) 01/03/2022    PROTIME 20.4 (H) 01/03/2022     No results found for: PTT    EKG:     Telemetry:    Ejection Fraction  No results found for: EF    Echo EF Estimated  Lab Results   Component Value Date    ECHOEFEST 22 01/03/2022         Present on Admission:  • Paroxysmal atrial fibrillation with rapid ventricular response (HCC)  • Acute on chronic systolic CHF (congestive heart failure) ([facility])  • Moderate malnutrition (CMS/HCC)  • Hypotension    Assessment/Plan   #1 atrial fibrillation with rapid ventricular      Status post electrical cardioversion maintained sinus rhythm with a continue oral amiodarone continue oral anticoagulation     #2 congestive heart failure possible acute on chronic     Should have marginal hemodynamic increasing creatinine renal output is appreciated.  Will hold diuretics ACE/ARB as per renal recommendation recommend to continue albumin on next 2 to 3 days       Restrict 1000 fluid     Sodium 2 g to  Patient was counseled educated avoid nonsteroid and drink        #3 moderate mitral tricuspid regurgitations will reassess after electrical cardioversions as an outpatient    Altagracia Mantilla MD  01/05/22  12:49 CST      Part of this note may be an electronic transcription/translation of spoken language to printed text using the Dragon Dictation system.

## 2022-01-06 LAB
ALBUMIN SERPL-MCNC: 4 G/DL (ref 3.5–5.2)
ALBUMIN/GLOB SERPL: 2 G/DL
ALP SERPL-CCNC: 61 U/L (ref 39–117)
ALT SERPL W P-5'-P-CCNC: 454 U/L (ref 1–41)
ANION GAP SERPL CALCULATED.3IONS-SCNC: 10 MMOL/L (ref 5–15)
AST SERPL-CCNC: 339 U/L (ref 1–40)
BASOPHILS # BLD AUTO: 0.02 10*3/MM3 (ref 0–0.2)
BASOPHILS NFR BLD AUTO: 0.2 % (ref 0–1.5)
BILIRUB SERPL-MCNC: 1.8 MG/DL (ref 0–1.2)
BUN SERPL-MCNC: 31 MG/DL (ref 8–23)
BUN/CREAT SERPL: 24.6 (ref 7–25)
CALCIUM SPEC-SCNC: 9.2 MG/DL (ref 8.6–10.5)
CHLORIDE SERPL-SCNC: 101 MMOL/L (ref 98–107)
CO2 SERPL-SCNC: 27 MMOL/L (ref 22–29)
CREAT SERPL-MCNC: 1.26 MG/DL (ref 0.76–1.27)
DEPRECATED RDW RBC AUTO: 47 FL (ref 37–54)
EOSINOPHIL # BLD AUTO: 0.02 10*3/MM3 (ref 0–0.4)
EOSINOPHIL NFR BLD AUTO: 0.2 % (ref 0.3–6.2)
ERYTHROCYTE [DISTWIDTH] IN BLOOD BY AUTOMATED COUNT: 13.8 % (ref 12.3–15.4)
GFR SERPL CREATININE-BSD FRML MDRD: 58 ML/MIN/1.73
GLOBULIN UR ELPH-MCNC: 2 GM/DL
GLUCOSE SERPL-MCNC: 137 MG/DL (ref 65–99)
HCT VFR BLD AUTO: 38.4 % (ref 37.5–51)
HGB BLD-MCNC: 12.9 G/DL (ref 13–17.7)
IMM GRANULOCYTES # BLD AUTO: 0.04 10*3/MM3 (ref 0–0.05)
IMM GRANULOCYTES NFR BLD AUTO: 0.5 % (ref 0–0.5)
LYMPHOCYTES # BLD AUTO: 0.69 10*3/MM3 (ref 0.7–3.1)
LYMPHOCYTES NFR BLD AUTO: 8.4 % (ref 19.6–45.3)
MCH RBC QN AUTO: 31.3 PG (ref 26.6–33)
MCHC RBC AUTO-ENTMCNC: 33.6 G/DL (ref 31.5–35.7)
MCV RBC AUTO: 93.2 FL (ref 79–97)
MONOCYTES # BLD AUTO: 0.72 10*3/MM3 (ref 0.1–0.9)
MONOCYTES NFR BLD AUTO: 8.7 % (ref 5–12)
NEUTROPHILS NFR BLD AUTO: 6.76 10*3/MM3 (ref 1.7–7)
NEUTROPHILS NFR BLD AUTO: 82 % (ref 42.7–76)
NRBC BLD AUTO-RTO: 0 /100 WBC (ref 0–0.2)
PLATELET # BLD AUTO: 132 10*3/MM3 (ref 140–450)
PMV BLD AUTO: 11.7 FL (ref 6–12)
POTASSIUM SERPL-SCNC: 3.6 MMOL/L (ref 3.5–5.2)
PROT SERPL-MCNC: 6 G/DL (ref 6–8.5)
RBC # BLD AUTO: 4.12 10*6/MM3 (ref 4.14–5.8)
SODIUM SERPL-SCNC: 138 MMOL/L (ref 136–145)
WBC NRBC COR # BLD: 8.25 10*3/MM3 (ref 3.4–10.8)

## 2022-01-06 PROCEDURE — 25010000002 ALBUMIN HUMAN 25% PER 50 ML: Performed by: FAMILY MEDICINE

## 2022-01-06 PROCEDURE — 85025 COMPLETE CBC W/AUTO DIFF WBC: CPT | Performed by: FAMILY MEDICINE

## 2022-01-06 PROCEDURE — 25010000002 DOBUTAMINE PER 250 MG: Performed by: NURSE PRACTITIONER

## 2022-01-06 PROCEDURE — 99232 SBSQ HOSP IP/OBS MODERATE 35: CPT | Performed by: INTERNAL MEDICINE

## 2022-01-06 PROCEDURE — 80053 COMPREHEN METABOLIC PANEL: CPT | Performed by: FAMILY MEDICINE

## 2022-01-06 PROCEDURE — P9047 ALBUMIN (HUMAN), 25%, 50ML: HCPCS | Performed by: FAMILY MEDICINE

## 2022-01-06 PROCEDURE — 25010000002 MORPHINE PER 10 MG: Performed by: INTERNAL MEDICINE

## 2022-01-06 RX ORDER — FUROSEMIDE 20 MG/1
20 TABLET ORAL
Status: DISCONTINUED | OUTPATIENT
Start: 2022-01-06 | End: 2022-01-08 | Stop reason: HOSPADM

## 2022-01-06 RX ORDER — LISINOPRIL 5 MG/1
5 TABLET ORAL
Status: DISCONTINUED | OUTPATIENT
Start: 2022-01-06 | End: 2022-01-08 | Stop reason: HOSPADM

## 2022-01-06 RX ORDER — CALCIUM CARBONATE 200(500)MG
2 TABLET,CHEWABLE ORAL 3 TIMES DAILY PRN
Status: DISCONTINUED | OUTPATIENT
Start: 2022-01-06 | End: 2022-01-08 | Stop reason: HOSPADM

## 2022-01-06 RX ORDER — TAMSULOSIN HYDROCHLORIDE 0.4 MG/1
0.4 CAPSULE ORAL NIGHTLY
Status: DISCONTINUED | OUTPATIENT
Start: 2022-01-06 | End: 2022-01-08 | Stop reason: HOSPADM

## 2022-01-06 RX ADMIN — PANTOPRAZOLE SODIUM 40 MG: 40 INJECTION, POWDER, FOR SOLUTION INTRAVENOUS at 05:07

## 2022-01-06 RX ADMIN — SODIUM CHLORIDE, PRESERVATIVE FREE 10 ML: 5 INJECTION INTRAVENOUS at 20:02

## 2022-01-06 RX ADMIN — MORPHINE SULFATE 2 MG: 2 INJECTION, SOLUTION INTRAMUSCULAR; INTRAVENOUS at 22:05

## 2022-01-06 RX ADMIN — FUROSEMIDE 20 MG: 20 TABLET ORAL at 17:15

## 2022-01-06 RX ADMIN — FUROSEMIDE 20 MG: 20 TABLET ORAL at 11:25

## 2022-01-06 RX ADMIN — LISINOPRIL 5 MG: 5 TABLET ORAL at 11:25

## 2022-01-06 RX ADMIN — ALBUMIN HUMAN 25 G: 0.25 SOLUTION INTRAVENOUS at 09:10

## 2022-01-06 RX ADMIN — AMIODARONE HYDROCHLORIDE 200 MG: 200 TABLET ORAL at 17:15

## 2022-01-06 RX ADMIN — CALCIUM CARBONATE (ANTACID) CHEW TAB 500 MG 2 TABLET: 500 CHEW TAB at 16:46

## 2022-01-06 RX ADMIN — VENLAFAXINE 75 MG: 75 TABLET ORAL at 20:02

## 2022-01-06 RX ADMIN — APIXABAN 5 MG: 5 TABLET, FILM COATED ORAL at 20:02

## 2022-01-06 RX ADMIN — METOPROLOL SUCCINATE 25 MG: 25 TABLET, FILM COATED, EXTENDED RELEASE ORAL at 09:07

## 2022-01-06 RX ADMIN — MORPHINE SULFATE 2 MG: 2 INJECTION, SOLUTION INTRAMUSCULAR; INTRAVENOUS at 20:00

## 2022-01-06 RX ADMIN — TAMSULOSIN HYDROCHLORIDE 0.4 MG: 0.4 CAPSULE ORAL at 20:02

## 2022-01-06 RX ADMIN — AMIODARONE HYDROCHLORIDE 200 MG: 200 TABLET ORAL at 09:07

## 2022-01-06 RX ADMIN — VENLAFAXINE 75 MG: 75 TABLET ORAL at 09:07

## 2022-01-06 RX ADMIN — DOBUTAMINE HYDROCHLORIDE 2.5 MCG/KG/MIN: 100 INJECTION INTRAVENOUS at 05:07

## 2022-01-06 RX ADMIN — APIXABAN 5 MG: 5 TABLET, FILM COATED ORAL at 11:25

## 2022-01-06 NOTE — PROGRESS NOTES
[facility] Medicine Services  INPATIENT PROGRESS NOTE    Length of Stay: 6  Date of Admission: 12/31/2021  Primary Care Physician: Estella Gutierrez MD    Subjective   Chief Complaint: Palpitations/dyspnea  HPI: Doing well and feels like his breathing is improved.  Off of dobutamine now.  Renal function improving.    Review of Systems   Constitutional: Negative for chills and fever.   HENT: Negative for congestion.    Respiratory: Positive for shortness of breath. Negative for cough and wheezing.    Cardiovascular: Negative for chest pain and palpitations.   Gastrointestinal: Negative for abdominal pain, constipation, diarrhea and nausea.   Genitourinary: Negative.    Musculoskeletal: Negative.    Neurological: Negative.    Psychiatric/Behavioral: Negative.    All other systems reviewed and are negative.     All pertinent negatives and positives are as above. All other systems have been reviewed and are negative unless otherwise stated.     Objective    Temp:  [97.3 °F (36.3 °C)-97.6 °F (36.4 °C)] 97.6 °F (36.4 °C)  Heart Rate:  [68-80] 75  Resp:  [14-22] 16  BP: (106-159)/(57-99) 150/86    Physical Exam  Constitutional:       General: He is not in acute distress.     Appearance: He is not toxic-appearing.   HENT:      Head: Normocephalic and atraumatic.      Right Ear: External ear normal.      Left Ear: External ear normal.      Nose: Nose normal.      Mouth/Throat:      Mouth: Mucous membranes are moist.      Pharynx: Oropharynx is clear.   Eyes:      Conjunctiva/sclera: Conjunctivae normal.   Cardiovascular:      Rate and Rhythm: Normal rate and regular rhythm.      Pulses: Normal pulses.      Heart sounds: Normal heart sounds.   Pulmonary:      Effort: Pulmonary effort is normal. No respiratory distress.      Breath sounds: Normal breath sounds.   Abdominal:      General: Bowel sounds are normal.      Palpations: Abdomen is soft.      Tenderness: There is no abdominal  tenderness.   Musculoskeletal:         General: No swelling.   Skin:     General: Skin is warm and dry.      Capillary Refill: Capillary refill takes less than 2 seconds.   Neurological:      General: No focal deficit present.      Mental Status: He is alert and oriented to person, place, and time. Mental status is at baseline.   Psychiatric:         Behavior: Behavior normal.         Thought Content: Thought content normal.         Results Review:  I have reviewed the labs, radiology results, and diagnostic studies.    Laboratory Data:   Results from last 7 days   Lab Units 01/06/22  0332 01/05/22  0956 01/04/22  0725   SODIUM mmol/L 138 136 135*   POTASSIUM mmol/L 3.6 3.8 4.8   CHLORIDE mmol/L 101 100 98   CO2 mmol/L 27.0 25.0 24.0   BUN mg/dL 31* 38* 36*   CREATININE mg/dL 1.26 1.74* 2.61*   GLUCOSE mg/dL 137* 143* 154*   CALCIUM mg/dL 9.2 8.8 9.3   BILIRUBIN mg/dL 1.8* 1.4* 1.3*   ALK PHOS U/L 61 59 66   ALT (SGPT) U/L 454* 576* 437*   AST (SGOT) U/L 339* 647* 842*   ANION GAP mmol/L 10.0 11.0 13.0     Estimated Creatinine Clearance: 75.1 mL/min (by C-G formula based on SCr of 1.26 mg/dL).  Results from last 7 days   Lab Units 01/02/22  0313   MAGNESIUM mg/dL 1.7         Results from last 7 days   Lab Units 01/06/22  0332 01/05/22  0956 01/04/22  0726 01/02/22  1348 12/31/21  0849   WBC 10*3/mm3 8.25 8.41 12.55* 10.04 7.43   HEMOGLOBIN g/dL 12.9* 13.0 14.1 15.1 15.1   HEMATOCRIT % 38.4 38.2 42.1 47.8 44.9   PLATELETS 10*3/mm3 132* 139* 177 206 191     Results from last 7 days   Lab Units 01/03/22  0612   INR  1.79*       Culture Data:   No results found for: BLOODCX  No results found for: URINECX  No results found for: RESPCX  No results found for: WOUNDCX  No results found for: STOOLCX  No components found for: BODYFLD    Radiology Data:   Imaging Results (Last 24 Hours)     ** No results found for the last 24 hours. **          I have reviewed the patient's current medications.     Assessment/Plan      Principal Problem:    Paroxysmal atrial fibrillation with rapid ventricular response (HCC)  Active Problems:    Acute on chronic systolic CHF (congestive heart failure) ([facility])    Moderate malnutrition (CMS/HCC)    Hypotension    Plan:  -Appreciate cardiology's assistance, s/p cardioversion on 1/3/2022.   -Continue with amiodarone along with lisinopril and Toprol-XL  -Continue fluid restriction  -Off of dobutamine and low-dose Lasix has been managed as well.  -Xanax available as well for anxiety as this is likely contributing to his shortness of breath.   -Appreciate nephrology's assistance  -DVT prophylaxis with Eliquis  -CODE STATUS: Full    I confirmed that the patient's Advance Care Plan is present, code status is documented, or surrogate decision maker is listed in the patient's medical record.     Discharge Planning: In process, okay to transfer to the floor from my perspective.  May be able to discharge tomorrow.    Efrain Arevalo MD

## 2022-01-06 NOTE — PROGRESS NOTES
Adult Nutrition  Assessment    Patient Name:  Kd Campa  YOB: 1960  MRN: [id number]  Admit Date:  12/31/2021    Assessment Date:  1/6/2022    Comments:  Pt continues w/ Afib w/ RVR and hx CHF-  is s/p cardioversion. MD notes A/C CHF. Pt now w/ CAROLYN ?r/t CTA dye exposure and nephrology consulted- diuretics on hold. Cardiac diet w/ 1000ml fluid restriction w/ Boost Plus TID per request. 100% breakfast this am. 1/1 wt 216# and 1/4 213#. CBW  195# w/ BMI 24.3. Pt met criteria for moderate, chronic malnutrition  1/1, see RD note/MSA. Nurse notes possible t/f out of CCU today as pt is off drips. RD to follow hospital course.         Anthropometrics     Row Name 01/06/22 0600          Anthropometrics    Weight 88.5 kg (195 lb 1.7 oz)                 Electronically signed by:  Gabrielle Lane RD  01/06/22 09:36 CST

## 2022-01-06 NOTE — PLAN OF CARE
Goal Outcome Evaluation:  Plan of Care Reviewed With: patient, family        Progress: improving

## 2022-01-06 NOTE — PROGRESS NOTES
LOS: 6 days   Patient Care Team:  Estella Gutierrez MD as PCP - General (Family Medicine)    Chief Complaint: States electrical cardioversion maintained sinus rhythm significant improvement in hemodynamic.  Continue amiodarone 200 mg patient started on low-dose lisinopril and metoprolol tolerating very well.  61 years old patient with background history of paroxysmal atrial fibrillation s/p EP study and A. fib ablation in the [place] area more than 3 years ago and a history of congestive heart failure have some weak muscle was on Lasix and blood pressure medicine discontinued.  The patient presented for evaluation increasing shortness of breath functional class III no limited orthopnea or PND noted in atrial fibrillation with rapid ventricular rate and clinically patient was in congestive heart failure with elevated BNP mild lower extremity edema and chest x-ray bilateral pleural effusion no evidence of pulmonary embolism on CT pulmonary angiogram.        Review of Systems:     Constitution:  Denies any fatigue, fever or chills.  Positive for activity change positive for fatigue    HENT:  Denies any headache, hearing impairment.    Eyes:  Denies any blurring of vision     Cardiovascular:  As per history of present illness.     Respiratory: Exertional dyspnea functional class III       Gastrointestinal:  No nausea, vomiting, or melena.    Extremity: No edema, positive pulses    Objective     Current Facility-Administered Medications   Medication Dose Route Frequency Provider Last Rate Last Admin   • ALPRAZolam (XANAX) tablet 0.5 mg  0.5 mg Oral TID PRN Arie Lockhart MD   0.5 mg at 01/03/22 2335   • amiodarone (PACERONE) tablet 200 mg  200 mg Oral BID With Meals Griselda Cummins APRN   200 mg at 01/06/22 0907   • apixaban (ELIQUIS) tablet 5 mg  5 mg Oral Q12H Griselda Cummins APRN       • furosemide (LASIX) tablet 20 mg  20 mg Oral BID Becka Aparicio MD       • lactated ringers bolus 250 mL  250 mL  Intravenous Once Arie Lockhart MD   Held at 01/04/22 0739   • lisinopril (PRINIVIL,ZESTRIL) tablet 5 mg  5 mg Oral Q24H Becka Aparicio MD       • Magnesium Sulfate 2 gram Bolus, followed by 8 gram infusion (total Mg dose 10 grams)- Mg less than or equal to 1mg/dL  2 g Intravenous PRN Efrain Arevalo MD        Or   • Magnesium Sulfate 2 gram / 50mL Infusion (GIVE X 3 BAGS TO EQUAL 6GM TOTAL DOSE) - Mg 1.1 - 1.5 mg/dl  2 g Intravenous PRN Efrain Arevalo MD        Or   • Magnesium Sulfate 4 gram infusion- Mg 1.6-1.9 mg/dL  4 g Intravenous PRN Efrain Arevalo MD       • metoprolol succinate XL (TOPROL-XL) 24 hr tablet 25 mg  25 mg Oral Q24H Altagracia Mantilla MD   25 mg at 01/06/22 0907   • morphine injection 2 mg  2 mg Intravenous Q2H PRN Celso Méndez MD   2 mg at 01/03/22 2225   • ondansetron (ZOFRAN) injection 4 mg  4 mg Intravenous Q6H PRN Celso Méndez MD   4 mg at 01/04/22 0929   • pantoprazole (PROTONIX) injection 40 mg  40 mg Intravenous Q AM Celso Méndez MD   40 mg at 01/06/22 0507   • potassium chloride (KLOR-CON) packet 40 mEq  40 mEq Oral PRN Efrain Arevalo MD       • potassium chloride (MICRO-K) CR capsule 40 mEq  40 mEq Oral PRN Efrain Arevalo MD   40 mEq at 01/03/22 2046   • prochlorperazine (COMPAZINE) injection 10 mg  10 mg Intravenous Q4H PRN Arik Haines MD   10 mg at 01/03/22 2202    Or   • prochlorperazine (COMPAZINE) tablet 5 mg  5 mg Oral Q6H PRN Arik Haines MD        Or   • prochlorperazine (COMPAZINE) suppository 25 mg  25 mg Rectal Q12H PRN Arik Haines MD       • sodium chloride 0.9 % flush 10 mL  10 mL Intravenous PRN Celso Méndez MD       • sodium chloride 0.9 % flush 10 mL  10 mL Intravenous Q12H Celso Méndez MD   10 mL at 01/05/22 2033   • sodium chloride 0.9 % flush 10 mL  10 mL Intravenous PRN Celso Méndez MD   10 mL at 01/03/22 1805   • tamsulosin (FLOMAX) 24 hr capsule 0.4 mg  0.4 mg Oral Nightly Becka Aparicio MD       • venlafaxine  "(EFFEXOR) tablet 75 mg  75 mg Oral BID Celso Méndez MD   75 mg at 01/06/22 0907       Vital Sign Min/Max for last 24 hours  Temp  Min: 97.3 °F (36.3 °C)  Max: 97.6 °F (36.4 °C)   BP  Min: 106/73  Max: 159/86   Pulse  Min: 68  Max: 80   Resp  Min: 14  Max: 22   SpO2  Min: 90 %  Max: 97 %   Flow (L/min)  Min: 1  Max: 2   Weight  Min: 88.5 kg (195 lb 1.7 oz)  Max: 88.5 kg (195 lb 1.7 oz)     Flowsheet Rows      First Filed Value   Admission Height 190.5 cm (75\") Documented at 12/31/2021 1602   Admission Weight 87.1 kg (192 lb 0.3 oz) Documented at 12/31/2021 1559            Intake/Output Summary (Last 24 hours) at 1/6/2022 1111  Last data filed at 1/6/2022 0507  Gross per 24 hour   Intake 655.7 ml   Output 775 ml   Net -119.3 ml       Physical Exam:  Neck: Supple.  Positive mild JVD  Chest: Creased bilateral air entry with mild rales  Cardiovascular system:  Regular rate and rhythm, no murmurs.  Abdomen: Soft, no tenderness, bowel sounds present, no hepatosplenomegaly.  CNS: Alert, oriented to place and time.  No motor or sensory deficit.  Cranial nerves intact.  Musculoskeletal: No deformity of the back or spine.  Extremities:  No edema.  Pulses equal on both sides.     Results Review:   I reviewed the patient's new clinical results.  Lab Results   Component Value Date    WBC 8.25 01/06/2022    HGB 12.9 (L) 01/06/2022    HCT 38.4 01/06/2022    MCV 93.2 01/06/2022     (L) 01/06/2022     Lab Results   Component Value Date    GLUCOSE 137 (H) 01/06/2022    BUN 31 (H) 01/06/2022    CREATININE 1.26 01/06/2022    EGFRIFNONA 58 (L) 01/06/2022    BCR 24.6 01/06/2022    CO2 27.0 01/06/2022    CALCIUM 9.2 01/06/2022    ALBUMIN 4.00 01/06/2022     (H) 01/06/2022     (H) 01/06/2022     No results found for: TSH  Lab Results   Component Value Date    INR 1.79 (H) 01/03/2022    PROTIME 20.4 (H) 01/03/2022     No results found for: PTT    EKG:     Telemetry:    Ejection Fraction  No results found for: " EF    Echo EF Estimated  Lab Results   Component Value Date    ECHOEFEST 22 01/03/2022         Present on Admission:  • Paroxysmal atrial fibrillation with rapid ventricular response ([facility])  • Acute on chronic systolic CHF (congestive heart failure) ([facility])  • Moderate malnutrition (CMS/HCC)  • Hypotension    Assessment/Plan   #1 atrial fibrillation with rapid ventricular      Status post electrical cardioversion maintained sinus rhythm with a continue oral amiodarone continue oral anticoagulation     #2 congestive heart failure possible acute on chronic  Maintain hemodynamic symptoms well-perfused no sign of cardiac decompensation.   off dobutamine.  Tolerating lisinopril will continue 5 mg continue Toprol-XL 25 mg and low-dose diuretics.  Will consider initiations of Aldactone based on patient clinical conditions.     s       Restrict 1000 fluid     Sodium 2 g to  Patient was counseled educated avoid nonsteroid and drink        #3 moderate mitral tricuspid regurgitations will reassess after electrical cardioversions as an outpatient    Altagracia Mantilla MD  01/06/22  11:11 CST      Part of this note may be an electronic transcription/translation of spoken language to printed text using the Dragon Dictation system.

## 2022-01-06 NOTE — PLAN OF CARE
Patient alert and oriented, resting comfortably, wakes up w/o issue. HR NSR w/ occasional ectopic beats, BP WNL. O2 titrated as tolerated.  Meds administered as seen on MAR. Improved UOP, patient continues to void via urinal.  Patient denies any nausea or pain. Fall and safety precautions remain intact.       Problem: Adult Inpatient Plan of Care  Goal: Plan of Care Review  Outcome: Ongoing, Progressing  Flowsheets (Taken 1/6/2022 0001)  Progress: improving  Plan of Care Reviewed With: patient  Goal: Patient-Specific Goal (Individualized)  Outcome: Ongoing, Progressing  Goal: Absence of Hospital-Acquired Illness or Injury  Outcome: Ongoing, Progressing  Intervention: Identify and Manage Fall Risk  Recent Flowsheet Documentation  Taken 1/5/2022 2200 by Becky Kaba RN  Safety Promotion/Fall Prevention:   activity supervised   assistive device/personal items within reach   clutter free environment maintained   fall prevention program maintained   lighting adjusted   nonskid shoes/slippers when out of bed   room organization consistent   safety round/check completed  Taken 1/5/2022 2030 by Becky Kaba RN  Safety Promotion/Fall Prevention:   activity supervised   assistive device/personal items within reach   clutter free environment maintained   fall prevention program maintained   lighting adjusted   nonskid shoes/slippers when out of bed   room organization consistent   safety round/check completed  Intervention: Prevent Skin Injury  Recent Flowsheet Documentation  Taken 1/5/2022 2200 by Becky Kaba RN  Body Position: position changed independently  Taken 1/5/2022 2030 by Becky Kaba RN  Body Position: position changed independently  Skin Protection:   adhesive use limited   incontinence pads utilized  Intervention: Prevent and Manage VTE (venous thromboembolism) Risk  Recent Flowsheet Documentation  Taken 1/5/2022 2030 by Becky Kaba RN  VTE Prevention/Management: (see MAR) other (see  comments)  Intervention: Prevent Infection  Recent Flowsheet Documentation  Taken 1/5/2022 2200 by Becky Kaba RN  Infection Prevention:   environmental surveillance performed   rest/sleep promoted  Taken 1/5/2022 2030 by Becky Kaba RN  Infection Prevention:   environmental surveillance performed   rest/sleep promoted  Goal: Optimal Comfort and Wellbeing  Outcome: Ongoing, Progressing  Goal: Readiness for Transition of Care  Outcome: Ongoing, Progressing     Problem: Heart Failure Comorbidity  Goal: Maintenance of Heart Failure Symptom Control  Outcome: Ongoing, Progressing  Intervention: Maintain Heart Failure-Management Strategies  Recent Flowsheet Documentation  Taken 1/5/2022 2030 by Becky Kaba RN  Medication Review/Management: medications reviewed     Problem: Heart Failure Comorbidity  Goal: Maintenance of Heart Failure Symptom Control  Outcome: Ongoing, Progressing  Intervention: Maintain Heart Failure-Management Strategies  Recent Flowsheet Documentation  Taken 1/5/2022 2030 by Becky Kaba RN  Medication Review/Management: medications reviewed     Problem: Fall Injury Risk  Goal: Absence of Fall and Fall-Related Injury  Outcome: Ongoing, Progressing  Intervention: Identify and Manage Contributors to Fall Injury Risk  Recent Flowsheet Documentation  Taken 1/5/2022 2030 by Becky Kaba RN  Medication Review/Management: medications reviewed  Intervention: Promote Injury-Free Environment  Recent Flowsheet Documentation  Taken 1/5/2022 2200 by Becky Kaba RN  Safety Promotion/Fall Prevention:   activity supervised   assistive device/personal items within reach   clutter free environment maintained   fall prevention program maintained   lighting adjusted   nonskid shoes/slippers when out of bed   room organization consistent   safety round/check completed  Taken 1/5/2022 2030 by Becky Kaba RN  Safety Promotion/Fall Prevention:   activity supervised   assistive  device/personal items within reach   clutter free environment maintained   fall prevention program maintained   lighting adjusted   nonskid shoes/slippers when out of bed   room organization consistent   safety round/check completed   Goal Outcome Evaluation:  Plan of Care Reviewed With: patient        Progress: improving

## 2022-01-06 NOTE — PROGRESS NOTES
"[facility]  [address] [place] [address]. [address]  T - [phone]  F - [phone]     Progress Note          PATIENT  DEMOGRAPHICS   PATIENT NAME: Kd Campa                      PHYSICIAN: Becka Aparicio MD  : 1960  MRN: [id number]   LOS: 6 days    Patient Care Team:  Estella Gutierrez MD as PCP - General (Family Medicine)  Subjective   SUBJECTIVE   Comfortable. No orthopnea         Objective   OBJECTIVE   Vital Signs  Temp:  [97.3 °F (36.3 °C)-97.6 °F (36.4 °C)] 97.6 °F (36.4 °C)  Heart Rate:  [68-80] 74  Resp:  [14-22] 16  BP: (106-159)/(57-99) 124/78    Flowsheet Rows      First Filed Value   Admission Height 190.5 cm (75\") Documented at 2021 1602   Admission Weight 87.1 kg (192 lb 0.3 oz) Documented at 2021 1559           I/O last 3 completed shifts:  In: 1456.7 [P.O.:120; I.V.:1036.7; IV Piggyback:300]  Out: 1600 [Urine:1600]    PHYSICAL EXAM    Physical Exam  Constitutional:       Appearance: He is well-developed.   HENT:      Head: Normocephalic.   Eyes:      Pupils: Pupils are equal, round, and reactive to light.   Cardiovascular:      Rate and Rhythm: Normal rate and regular rhythm.      Heart sounds: Normal heart sounds.   Pulmonary:      Effort: Pulmonary effort is normal.      Breath sounds: Normal breath sounds.   Abdominal:      General: Bowel sounds are normal.      Palpations: Abdomen is soft.   Musculoskeletal:         General: No swelling.   Skin:     Coloration: Skin is not jaundiced.   Neurological:      General: No focal deficit present.      Mental Status: He is alert and oriented to person, place, and time.         RESULTS   Results Review:    Results from last 7 days   Lab Units 22  0332 22  0956 22  0725   SODIUM mmol/L 138 136 135*   POTASSIUM mmol/L 3.6 3.8 4.8   CHLORIDE mmol/L 101 100 98   CO2 mmol/L 27.0 25.0 24.0   BUN mg/dL 31* 38* 36*   CREATININE mg/dL 1.26 1.74* 2.61*   CALCIUM mg/dL 9.2 8.8 9.3 "   BILIRUBIN mg/dL 1.8* 1.4* 1.3*   ALK PHOS U/L 61 59 66   ALT (SGPT) U/L 454* 576* 437*   AST (SGOT) U/L 339* 647* 842*   GLUCOSE mg/dL 137* 143* 154*       Estimated Creatinine Clearance: 75.1 mL/min (by C-G formula based on SCr of 1.26 mg/dL).    Results from last 7 days   Lab Units 01/02/22  0313   MAGNESIUM mg/dL 1.7             Results from last 7 days   Lab Units 01/06/22  0332 01/05/22  0956 01/04/22  0726 01/02/22  1348 12/31/21  0849   WBC 10*3/mm3 8.25 8.41 12.55* 10.04 7.43   HEMOGLOBIN g/dL 12.9* 13.0 14.1 15.1 15.1   PLATELETS 10*3/mm3 132* 139* 177 206 191       Results from last 7 days   Lab Units 01/03/22  0612   INR  1.79*         Imaging Results (Last 24 Hours)     ** No results found for the last 24 hours. **           MEDICATIONS    albumin human, 25 g, Intravenous, BID  amiodarone, 200 mg, Oral, BID With Meals  apixaban, 5 mg, Oral, Q12H  lactated ringers, 250 mL, Intravenous, Once  metoprolol succinate XL, 25 mg, Oral, Q24H  pantoprazole, 40 mg, Intravenous, Q AM  sodium chloride, 10 mL, Intravenous, Q12H  venlafaxine, 75 mg, Oral, BID           Assessment/Plan   ASSESSMENT / PLAN      Paroxysmal atrial fibrillation with rapid ventricular response (HCC)    Acute on chronic systolic CHF (congestive heart failure) ([facility])    Moderate malnutrition (CMS/HCC)    Hypotension       1.  CAROLYN- Baseline creatinine around 1.1, creatinine peak up to 2.6.  Etiology is likely multifactorial secondary to dye exposure from CTA, diuretics, as well as intermittent hypotension.  cr better and dc albumin.     Add oral lasix. Low dose lisinopril. Hold Flomax, spironolactone (due to low bp , carolyn).   u/s renal no hydro . fena is consistent with pre renal pictures.     2.  A. fib with RVR- cardiology following, improved following cardioversion on 1/3/2022. Has prior ablation procedure     3.  Acute on chronic CHF- EF 22% cardiology following, improved, off dobutamine     4.  Intermittent hypotension-now better      Will  review lab in am and if all stable will see him in the office              This document has been electronically signed by Becka Aparicio MD on January 6, 2022 11:02 CST

## 2022-01-06 NOTE — PLAN OF CARE
Goal Outcome Evaluation:           Progress: improving  Outcome Summary: vss, doubtamine d/c this shift, doing well, PO lasix and lisinopril started

## 2022-01-07 ENCOUNTER — APPOINTMENT (OUTPATIENT)
Dept: CARDIOLOGY | Facility: HOSPITAL | Age: 62
End: 2022-01-07

## 2022-01-07 LAB
ALBUMIN SERPL-MCNC: 3.9 G/DL (ref 3.5–5.2)
ALBUMIN/GLOB SERPL: 1.9 G/DL
ALP SERPL-CCNC: 61 U/L (ref 39–117)
ALT SERPL W P-5'-P-CCNC: 358 U/L (ref 1–41)
ANION GAP SERPL CALCULATED.3IONS-SCNC: 12 MMOL/L (ref 5–15)
AST SERPL-CCNC: 202 U/L (ref 1–40)
BASOPHILS # BLD AUTO: 0.02 10*3/MM3 (ref 0–0.2)
BASOPHILS NFR BLD AUTO: 0.2 % (ref 0–1.5)
BH CV ECHO MEAS - AO MAX PG: 4.1 MMHG
BH CV ECHO MEAS - AO V2 MAX: 101 CM/SEC
BH CV ECHO MEAS - BSA(HAYCOCK): 2.2 M^2
BH CV ECHO MEAS - BSA: 2.2 M^2
BH CV ECHO MEAS - BZI_BMI: 24.4 KILOGRAMS/M^2
BH CV ECHO MEAS - BZI_METRIC_HEIGHT: 190.5 CM
BH CV ECHO MEAS - BZI_METRIC_WEIGHT: 88.5 KG
BH CV ECHO MEAS - EDV(CUBED): 175.6 ML
BH CV ECHO MEAS - EDV(MOD-SP2): 150 ML
BH CV ECHO MEAS - EDV(MOD-SP4): 123 ML
BH CV ECHO MEAS - EDV(TEICH): 153.7 ML
BH CV ECHO MEAS - EF(CUBED): 27.1 %
BH CV ECHO MEAS - EF(MOD-BP): 28 %
BH CV ECHO MEAS - EF(MOD-SP2): 28 %
BH CV ECHO MEAS - EF(MOD-SP4): 29.9 %
BH CV ECHO MEAS - EF(TEICH): 21.6 %
BH CV ECHO MEAS - ESV(CUBED): 128 ML
BH CV ECHO MEAS - ESV(MOD-SP2): 108 ML
BH CV ECHO MEAS - ESV(MOD-SP4): 86.2 ML
BH CV ECHO MEAS - ESV(TEICH): 120.5 ML
BH CV ECHO MEAS - FS: 10 %
BH CV ECHO MEAS - IVS/LVPW: 1
BH CV ECHO MEAS - IVSD: 0.93 CM
BH CV ECHO MEAS - LA DIMENSION: 4.6 CM
BH CV ECHO MEAS - LV DIASTOLIC VOL/BSA (35-75): 56.7 ML/M^2
BH CV ECHO MEAS - LV MASS(C)D: 197.7 GRAMS
BH CV ECHO MEAS - LV MASS(C)DI: 91.1 GRAMS/M^2
BH CV ECHO MEAS - LV SYSTOLIC VOL/BSA (12-30): 39.7 ML/M^2
BH CV ECHO MEAS - LVIDD: 5.6 CM
BH CV ECHO MEAS - LVIDS: 5 CM
BH CV ECHO MEAS - LVLD AP2: 8.1 CM
BH CV ECHO MEAS - LVLD AP4: 8.5 CM
BH CV ECHO MEAS - LVLS AP2: 7.9 CM
BH CV ECHO MEAS - LVLS AP4: 7.6 CM
BH CV ECHO MEAS - LVPWD: 0.92 CM
BH CV ECHO MEAS - MR MAX PG: 90.6 MMHG
BH CV ECHO MEAS - MR MAX VEL: 476 CM/SEC
BH CV ECHO MEAS - RAP SYSTOLE: 20 MMHG
BH CV ECHO MEAS - RVSP: 52.7 MMHG
BH CV ECHO MEAS - SI(CUBED): 21.9 ML/M^2
BH CV ECHO MEAS - SI(MOD-SP2): 19.3 ML/M^2
BH CV ECHO MEAS - SI(MOD-SP4): 17 ML/M^2
BH CV ECHO MEAS - SI(TEICH): 15.3 ML/M^2
BH CV ECHO MEAS - SV(CUBED): 47.6 ML
BH CV ECHO MEAS - SV(MOD-SP2): 42 ML
BH CV ECHO MEAS - SV(MOD-SP4): 36.8 ML
BH CV ECHO MEAS - SV(TEICH): 33.2 ML
BH CV ECHO MEAS - TR MAX VEL: 286 CM/SEC
BILIRUB SERPL-MCNC: 2.3 MG/DL (ref 0–1.2)
BUN SERPL-MCNC: 25 MG/DL (ref 8–23)
BUN/CREAT SERPL: 22.7 (ref 7–25)
CALCIUM SPEC-SCNC: 9.3 MG/DL (ref 8.6–10.5)
CHLORIDE SERPL-SCNC: 100 MMOL/L (ref 98–107)
CO2 SERPL-SCNC: 26 MMOL/L (ref 22–29)
CREAT SERPL-MCNC: 1.1 MG/DL (ref 0.76–1.27)
DEPRECATED RDW RBC AUTO: 47.2 FL (ref 37–54)
EOSINOPHIL # BLD AUTO: 0.06 10*3/MM3 (ref 0–0.4)
EOSINOPHIL NFR BLD AUTO: 0.7 % (ref 0.3–6.2)
ERYTHROCYTE [DISTWIDTH] IN BLOOD BY AUTOMATED COUNT: 13.9 % (ref 12.3–15.4)
GFR SERPL CREATININE-BSD FRML MDRD: 68 ML/MIN/1.73
GLOBULIN UR ELPH-MCNC: 2.1 GM/DL
GLUCOSE SERPL-MCNC: 122 MG/DL (ref 65–99)
HCT VFR BLD AUTO: 38.6 % (ref 37.5–51)
HGB BLD-MCNC: 13.2 G/DL (ref 13–17.7)
IMM GRANULOCYTES # BLD AUTO: 0.03 10*3/MM3 (ref 0–0.05)
IMM GRANULOCYTES NFR BLD AUTO: 0.4 % (ref 0–0.5)
LYMPHOCYTES # BLD AUTO: 1.22 10*3/MM3 (ref 0.7–3.1)
LYMPHOCYTES NFR BLD AUTO: 14.4 % (ref 19.6–45.3)
MCH RBC QN AUTO: 31.7 PG (ref 26.6–33)
MCHC RBC AUTO-ENTMCNC: 34.2 G/DL (ref 31.5–35.7)
MCV RBC AUTO: 92.6 FL (ref 79–97)
MONOCYTES # BLD AUTO: 0.96 10*3/MM3 (ref 0.1–0.9)
MONOCYTES NFR BLD AUTO: 11.4 % (ref 5–12)
NEUTROPHILS NFR BLD AUTO: 6.16 10*3/MM3 (ref 1.7–7)
NEUTROPHILS NFR BLD AUTO: 72.9 % (ref 42.7–76)
NRBC BLD AUTO-RTO: 0 /100 WBC (ref 0–0.2)
PLATELET # BLD AUTO: 131 10*3/MM3 (ref 140–450)
PMV BLD AUTO: 11.4 FL (ref 6–12)
POTASSIUM SERPL-SCNC: 3.9 MMOL/L (ref 3.5–5.2)
PROT SERPL-MCNC: 6 G/DL (ref 6–8.5)
RBC # BLD AUTO: 4.17 10*6/MM3 (ref 4.14–5.8)
SODIUM SERPL-SCNC: 138 MMOL/L (ref 136–145)
WBC NRBC COR # BLD: 8.45 10*3/MM3 (ref 3.4–10.8)

## 2022-01-07 PROCEDURE — 93321 DOPPLER ECHO F-UP/LMTD STD: CPT | Performed by: INTERNAL MEDICINE

## 2022-01-07 PROCEDURE — 80053 COMPREHEN METABOLIC PANEL: CPT | Performed by: FAMILY MEDICINE

## 2022-01-07 PROCEDURE — 93325 DOPPLER ECHO COLOR FLOW MAPG: CPT | Performed by: INTERNAL MEDICINE

## 2022-01-07 PROCEDURE — 25010000002 ENOXAPARIN PER 10 MG: Performed by: NURSE PRACTITIONER

## 2022-01-07 PROCEDURE — 93325 DOPPLER ECHO COLOR FLOW MAPG: CPT

## 2022-01-07 PROCEDURE — 93308 TTE F-UP OR LMTD: CPT | Performed by: INTERNAL MEDICINE

## 2022-01-07 PROCEDURE — 93321 DOPPLER ECHO F-UP/LMTD STD: CPT

## 2022-01-07 PROCEDURE — 93308 TTE F-UP OR LMTD: CPT

## 2022-01-07 PROCEDURE — 85025 COMPLETE CBC W/AUTO DIFF WBC: CPT | Performed by: FAMILY MEDICINE

## 2022-01-07 RX ADMIN — PANTOPRAZOLE SODIUM 40 MG: 40 INJECTION, POWDER, FOR SOLUTION INTRAVENOUS at 06:26

## 2022-01-07 RX ADMIN — APIXABAN 5 MG: 5 TABLET, FILM COATED ORAL at 08:19

## 2022-01-07 RX ADMIN — AMIODARONE HYDROCHLORIDE 200 MG: 200 TABLET ORAL at 17:31

## 2022-01-07 RX ADMIN — TAMSULOSIN HYDROCHLORIDE 0.4 MG: 0.4 CAPSULE ORAL at 20:03

## 2022-01-07 RX ADMIN — SODIUM CHLORIDE, PRESERVATIVE FREE 10 ML: 5 INJECTION INTRAVENOUS at 08:19

## 2022-01-07 RX ADMIN — FUROSEMIDE 20 MG: 20 TABLET ORAL at 17:31

## 2022-01-07 RX ADMIN — ENOXAPARIN SODIUM 90 MG: 100 INJECTION SUBCUTANEOUS at 17:31

## 2022-01-07 RX ADMIN — METOPROLOL SUCCINATE 25 MG: 25 TABLET, FILM COATED, EXTENDED RELEASE ORAL at 08:19

## 2022-01-07 RX ADMIN — SODIUM CHLORIDE, PRESERVATIVE FREE 10 ML: 5 INJECTION INTRAVENOUS at 20:19

## 2022-01-07 RX ADMIN — FUROSEMIDE 20 MG: 20 TABLET ORAL at 08:19

## 2022-01-07 RX ADMIN — LISINOPRIL 5 MG: 5 TABLET ORAL at 08:19

## 2022-01-07 RX ADMIN — VENLAFAXINE 75 MG: 75 TABLET ORAL at 20:03

## 2022-01-07 RX ADMIN — AMIODARONE HYDROCHLORIDE 200 MG: 200 TABLET ORAL at 08:19

## 2022-01-07 RX ADMIN — VENLAFAXINE 75 MG: 75 TABLET ORAL at 08:19

## 2022-01-07 RX ADMIN — ALPRAZOLAM 0.5 MG: 0.5 TABLET ORAL at 20:03

## 2022-01-07 RX ADMIN — Medication 400 MG: at 20:19

## 2022-01-07 RX ADMIN — ALPRAZOLAM 0.5 MG: 0.5 TABLET ORAL at 10:22

## 2022-01-07 NOTE — PAYOR COMM NOTE
"      Misti Iqbal RN [facility]  [phone]       Phone  [phone]        Fax  Cont. Stay review      Kd Campa (61 y.o. Male)             Date of Birth Social Security Number Address Home Phone MRN    1960  [address] [facility] [address] [phone] [id number]    Restorationist Marital Status             Adventism        Admission Date Admission Type Admitting Provider Attending Provider Department, Room/Bed    12/31/21 Emergency Celso Méndez MD Popescu, Tudor, MD [facility] CRITICAL CARE STEPDOWN, 15/A    Discharge Date Discharge Disposition Discharge Destination                         Attending Provider: Celso Méndez MD    Allergies: No Known Allergies    Isolation: None   Infection: None   Code Status: CPR   Advance Care Planning Activity    Ht: 190.5 cm (75\")   Wt: 88.5 kg (195 lb 1.7 oz)    Admission Cmt: None   Principal Problem: Paroxysmal atrial fibrillation with rapid ventricular response (HCC) [I48.0]                 Active Insurance as of 12/31/2021     Patient has no active insurance coverage on file for 12/31/2021.          Emergency Contacts      (Rel.) Home Phone Work Phone Mobile Phone    Val Doshi (Daughter) [phone] -- [phone]    Jd Campa (Son) -- -- [phone]    lissy leyva (Sister) -- -- [phone]            Vital Signs (last day)     Date/Time Temp Temp src Pulse Resp BP Patient Position SpO2    01/07/22 0413 -- -- 69 -- 141/83 -- 87    01/07/22 0300 98.8 (37.1) Oral 71 18 106/68 Lying 88    01/07/22 0200 -- -- 71 -- 94/55 -- 93    01/07/22 0002 98.2 (36.8) Oral 66 18 89/57 -- 94    01/06/22 2202 -- -- 74 -- 94/63 -- 83    01/06/22 2103 -- -- 77 -- 135/76 -- 93    01/06/22 2049 98.1 (36.7) Oral -- -- -- -- --    01/06/22 2000 -- -- 82 -- 156/75 -- 99    01/06/22 1900 -- -- 84 -- 111/83 -- 86    01/06/22 1800 -- -- 79 -- 105/77 -- 97    " 01/06/22 1731 -- -- 75 -- 107/66 -- 95    01/06/22 1719 -- -- 90 -- 118/56 -- 95    01/06/22 1600 97 (36.1) Oral 69 16 102/74 Lying 90    01/06/22 1500 -- -- 70 -- 109/83 -- 92    01/06/22 1400 -- -- 82 -- 147/77 -- 91    01/06/22 1300 -- -- 75 -- 150/86 -- 93    01/06/22 1200 97.6 (36.4) Axillary 80 16 141/90 Lying 94    01/06/22 1100 -- -- 75 -- 114/87 -- 94    01/06/22 1000 -- -- 74 -- 124/78 -- 95    01/06/22 0900 -- -- 77 -- 145/71 -- 94    01/06/22 0800 97.6 (36.4) Oral 78 16 126/82 Lying 93    01/06/22 0700 -- -- 80 20 159/86 -- 94    01/06/22 0600 -- -- 75 22 157/77 -- 94    01/06/22 0500 -- -- 76 21 140/86 -- 91    01/06/22 0400 97.3 (36.3) Temporal 77 17 129/94 Lying 90    01/06/22 0300 -- -- 77 22 139/91 -- 90    01/06/22 0200 -- -- 74 20 140/99 -- 92    01/06/22 0100 -- -- 79 18 129/92 -- 93    01/06/22 0000 97.3 (36.3) Temporal 78 14 127/88 Lying 95          Oxygen Therapy (last day)     Date/Time SpO2 Device (Oxygen Therapy) Flow (L/min) Oxygen Concentration (%) ETCO2 (mmHg)    01/07/22 0413 87 -- -- -- --    01/07/22 0300 88 -- -- -- --    01/07/22 0200 93 -- -- -- --    01/07/22 0002 94 -- -- -- --    01/06/22 2202 83 -- -- -- --    01/06/22 2103 93 -- -- -- --    01/06/22 2000 99 room air -- -- --    01/06/22 1900 86 -- -- -- --    01/06/22 1800 97 -- -- -- --    01/06/22 1731 95 -- -- -- --    01/06/22 1719 95 -- -- -- --    01/06/22 1600 90 room air -- -- --    01/06/22 1500 92 -- -- -- --    01/06/22 1400 91 -- -- -- --    01/06/22 1300 93 -- -- -- --    01/06/22 1200 94 -- -- -- --    01/06/22 1100 94 -- -- -- --    01/06/22 1000 95 -- -- -- --    01/06/22 0900 94 -- -- -- --    01/06/22 0800 93 room air -- -- --    01/06/22 0700 94 -- -- -- --    01/06/22 0600 94 -- -- -- --    01/06/22 0500 91 -- -- -- --    01/06/22 0400 90 room air -- -- --    01/06/22 0300 90 -- -- -- --    01/06/22 0200 92 room air -- -- --    01/06/22 0100 93 -- -- -- --    01/06/22 0000 95 room air -- -- --             Current Facility-Administered Medications   Medication Dose Route Frequency Provider Last Rate Last Admin   • ALPRAZolam (XANAX) tablet 0.5 mg  0.5 mg Oral TID PRN Arie Lockhart MD   0.5 mg at 01/03/22 2335   • amiodarone (PACERONE) tablet 200 mg  200 mg Oral BID With Meals Griselda Cummins, APRN   200 mg at 01/07/22 0819   • apixaban (ELIQUIS) tablet 5 mg  5 mg Oral Q12H Griselda Cummins, APRN   5 mg at 01/07/22 0819   • calcium carbonate (TUMS) chewable tablet 500 mg (200 mg elemental)  2 tablet Oral TID PRN Efrain Arevalo MD   2 tablet at 01/06/22 1646   • furosemide (LASIX) tablet 20 mg  20 mg Oral BID Becka Aparicio MD   20 mg at 01/07/22 0819   • lactated ringers bolus 250 mL  250 mL Intravenous Once Arie Lockhart MD   Held at 01/04/22 0739   • lisinopril (PRINIVIL,ZESTRIL) tablet 5 mg  5 mg Oral Q24H Becka Aparicio MD   5 mg at 01/07/22 0819   • Magnesium Sulfate 2 gram Bolus, followed by 8 gram infusion (total Mg dose 10 grams)- Mg less than or equal to 1mg/dL  2 g Intravenous PRN Efrain Arevalo MD        Or   • Magnesium Sulfate 2 gram / 50mL Infusion (GIVE X 3 BAGS TO EQUAL 6GM TOTAL DOSE) - Mg 1.1 - 1.5 mg/dl  2 g Intravenous PRN Efrain Arevalo MD        Or   • Magnesium Sulfate 4 gram infusion- Mg 1.6-1.9 mg/dL  4 g Intravenous PRN Efrain Arevalo MD       • metoprolol succinate XL (TOPROL-XL) 24 hr tablet 25 mg  25 mg Oral Q24H Altagracia Mantilla MD   25 mg at 01/07/22 0819   • morphine injection 2 mg  2 mg Intravenous Q2H PRN Celso Méndez MD   2 mg at 01/06/22 2205   • ondansetron (ZOFRAN) injection 4 mg  4 mg Intravenous Q6H PRN Celso Méndez MD   4 mg at 01/04/22 0929   • pantoprazole (PROTONIX) injection 40 mg  40 mg Intravenous Q AM Celso Méndez MD   40 mg at 01/07/22 0626   • potassium chloride (KLOR-CON) packet 40 mEq  40 mEq Oral PRN Efrain Arevalo MD       • potassium chloride (MICRO-K) CR capsule 40 mEq  40 mEq Oral PRN Efrain Arevalo MD   40 mEq at  01/03/22 2046   • prochlorperazine (COMPAZINE) injection 10 mg  10 mg Intravenous Q4H PRN Arik Haines MD   10 mg at 01/03/22 2202    Or   • prochlorperazine (COMPAZINE) tablet 5 mg  5 mg Oral Q6H PRN Arik Haines MD        Or   • prochlorperazine (COMPAZINE) suppository 25 mg  25 mg Rectal Q12H PRN Arik Haines MD       • sodium chloride 0.9 % flush 10 mL  10 mL Intravenous PRN Celso Méndez MD       • sodium chloride 0.9 % flush 10 mL  10 mL Intravenous Q12H Celso Méndez MD   10 mL at 01/07/22 0819   • sodium chloride 0.9 % flush 10 mL  10 mL Intravenous PRN Celso Méndez MD   10 mL at 01/03/22 1805   • tamsulosin (FLOMAX) 24 hr capsule 0.4 mg  0.4 mg Oral Nightly Becka Aparicio MD   0.4 mg at 01/06/22 2002   • venlafaxine (EFFEXOR) tablet 75 mg  75 mg Oral BID Celso Méndez MD   75 mg at 01/07/22 0819     Ventilator/Non-Invasive Ventilation Settings (From admission, onward)            None           Physician Progress Notes (last 24 hours)      Efrain Arevalo MD at 01/06/22 1429              [facility]  INPATIENT PROGRESS NOTE    Length of Stay: 6  Date of Admission: 12/31/2021  Primary Care Physician: Estella Gutierrez MD    Subjective   Chief Complaint: Palpitations/dyspnea  HPI: Doing well and feels like his breathing is improved.  Off of dobutamine now.  Renal function improving.    Review of Systems   Constitutional: Negative for chills and fever.   HENT: Negative for congestion.    Respiratory: Positive for shortness of breath. Negative for cough and wheezing.    Cardiovascular: Negative for chest pain and palpitations.   Gastrointestinal: Negative for abdominal pain, constipation, diarrhea and nausea.   Genitourinary: Negative.    Musculoskeletal: Negative.    Neurological: Negative.    Psychiatric/Behavioral: Negative.    All other systems reviewed and are negative.     All pertinent negatives and positives are as above.  All other systems have been reviewed and are negative unless otherwise stated.     Objective    Temp:  [97.3 °F (36.3 °C)-97.6 °F (36.4 °C)] 97.6 °F (36.4 °C)  Heart Rate:  [68-80] 75  Resp:  [14-22] 16  BP: (106-159)/(57-99) 150/86    Physical Exam  Constitutional:       General: He is not in acute distress.     Appearance: He is not toxic-appearing.   HENT:      Head: Normocephalic and atraumatic.      Right Ear: External ear normal.      Left Ear: External ear normal.      Nose: Nose normal.      Mouth/Throat:      Mouth: Mucous membranes are moist.      Pharynx: Oropharynx is clear.   Eyes:      Conjunctiva/sclera: Conjunctivae normal.   Cardiovascular:      Rate and Rhythm: Normal rate and regular rhythm.      Pulses: Normal pulses.      Heart sounds: Normal heart sounds.   Pulmonary:      Effort: Pulmonary effort is normal. No respiratory distress.      Breath sounds: Normal breath sounds.   Abdominal:      General: Bowel sounds are normal.      Palpations: Abdomen is soft.      Tenderness: There is no abdominal tenderness.   Musculoskeletal:         General: No swelling.   Skin:     General: Skin is warm and dry.      Capillary Refill: Capillary refill takes less than 2 seconds.   Neurological:      General: No focal deficit present.      Mental Status: He is alert and oriented to person, place, and time. Mental status is at baseline.   Psychiatric:         Behavior: Behavior normal.         Thought Content: Thought content normal.         Results Review:  I have reviewed the labs, radiology results, and diagnostic studies.    Laboratory Data:   Results from last 7 days   Lab Units 01/06/22  0332 01/05/22  0956 01/04/22  0725   SODIUM mmol/L 138 136 135*   POTASSIUM mmol/L 3.6 3.8 4.8   CHLORIDE mmol/L 101 100 98   CO2 mmol/L 27.0 25.0 24.0   BUN mg/dL 31* 38* 36*   CREATININE mg/dL 1.26 1.74* 2.61*   GLUCOSE mg/dL 137* 143* 154*   CALCIUM mg/dL 9.2 8.8 9.3   BILIRUBIN mg/dL 1.8* 1.4* 1.3*   ALK PHOS U/L 61  59 66   ALT (SGPT) U/L 454* 576* 437*   AST (SGOT) U/L 339* 647* 842*   ANION GAP mmol/L 10.0 11.0 13.0     Estimated Creatinine Clearance: 75.1 mL/min (by C-G formula based on SCr of 1.26 mg/dL).  Results from last 7 days   Lab Units 01/02/22  0313   MAGNESIUM mg/dL 1.7         Results from last 7 days   Lab Units 01/06/22  0332 01/05/22  0956 01/04/22  0726 01/02/22  1348 12/31/21  0849   WBC 10*3/mm3 8.25 8.41 12.55* 10.04 7.43   HEMOGLOBIN g/dL 12.9* 13.0 14.1 15.1 15.1   HEMATOCRIT % 38.4 38.2 42.1 47.8 44.9   PLATELETS 10*3/mm3 132* 139* 177 206 191     Results from last 7 days   Lab Units 01/03/22  0612   INR  1.79*       Culture Data:   No results found for: BLOODCX  No results found for: URINECX  No results found for: RESPCX  No results found for: WOUNDCX  No results found for: STOOLCX  No components found for: BODYFLD    Radiology Data:   Imaging Results (Last 24 Hours)     ** No results found for the last 24 hours. **          I have reviewed the patient's current medications.     Assessment/Plan     Principal Problem:    Paroxysmal atrial fibrillation with rapid ventricular response (HCC)  Active Problems:    Acute on chronic systolic CHF (congestive heart failure) ([facility])    Moderate malnutrition (CMS/HCC)    Hypotension    Plan:  -Appreciate cardiology's assistance, s/p cardioversion on 1/3/2022.   -Continue with amiodarone along with lisinopril and Toprol-XL  -Continue fluid restriction  -Off of dobutamine and low-dose Lasix has been managed as well.  -Xanax available as well for anxiety as this is likely contributing to his shortness of breath.   -Appreciate nephrology's assistance  -DVT prophylaxis with Eliquis  -CODE STATUS: Full    I confirmed that the patient's Advance Care Plan is present, code status is documented, or surrogate decision maker is listed in the patient's medical record.     Discharge Planning: In process, okay to transfer to the floor from my perspective.  May be able to discharge  tomorrow.    Efrain Arevalo MD      Electronically signed by Efrain Arevalo MD at 01/06/22 1430     Altagracia Mantilla MD at 01/06/22 1111           LOS: 6 days   Patient Care Team:  Estella Gutierrez MD as PCP - General (Family Medicine)    Chief Complaint: States electrical cardioversion maintained sinus rhythm significant improvement in hemodynamic.  Continue amiodarone 200 mg patient started on low-dose lisinopril and metoprolol tolerating very well.  61 years old patient with background history of paroxysmal atrial fibrillation s/p EP study and A. fib ablation in the [place] area more than 3 years ago and a history of congestive heart failure have some weak muscle was on Lasix and blood pressure medicine discontinued.  The patient presented for evaluation increasing shortness of breath functional class III no limited orthopnea or PND noted in atrial fibrillation with rapid ventricular rate and clinically patient was in congestive heart failure with elevated BNP mild lower extremity edema and chest x-ray bilateral pleural effusion no evidence of pulmonary embolism on CT pulmonary angiogram.        Review of Systems:     Constitution:  Denies any fatigue, fever or chills.  Positive for activity change positive for fatigue    HENT:  Denies any headache, hearing impairment.    Eyes:  Denies any blurring of vision     Cardiovascular:  As per history of present illness.     Respiratory: Exertional dyspnea functional class III       Gastrointestinal:  No nausea, vomiting, or melena.    Extremity: No edema, positive pulses    Objective     Current Facility-Administered Medications   Medication Dose Route Frequency Provider Last Rate Last Admin   • ALPRAZolam (XANAX) tablet 0.5 mg  0.5 mg Oral TID PRN Arie Lockhart MD   0.5 mg at 01/03/22 2335   • amiodarone (PACERONE) tablet 200 mg  200 mg Oral BID With Meals Griselda Cummins APRN   200 mg at 01/06/22 0907   • apixaban (ELIQUIS) tablet 5 mg  5 mg Oral Q12H  Griselda Cummins, APRN       • furosemide (LASIX) tablet 20 mg  20 mg Oral BID Becka Aparicio MD       • lactated ringers bolus 250 mL  250 mL Intravenous Once Arie Lockhart MD   Held at 01/04/22 0739   • lisinopril (PRINIVIL,ZESTRIL) tablet 5 mg  5 mg Oral Q24H Becka Aparicio MD       • Magnesium Sulfate 2 gram Bolus, followed by 8 gram infusion (total Mg dose 10 grams)- Mg less than or equal to 1mg/dL  2 g Intravenous PRN Efrain Arevalo MD        Or   • Magnesium Sulfate 2 gram / 50mL Infusion (GIVE X 3 BAGS TO EQUAL 6GM TOTAL DOSE) - Mg 1.1 - 1.5 mg/dl  2 g Intravenous PRN Efrain Arevalo MD        Or   • Magnesium Sulfate 4 gram infusion- Mg 1.6-1.9 mg/dL  4 g Intravenous PRN Efrain Arevalo MD       • metoprolol succinate XL (TOPROL-XL) 24 hr tablet 25 mg  25 mg Oral Q24H Altagracia Mantilla MD   25 mg at 01/06/22 0907   • morphine injection 2 mg  2 mg Intravenous Q2H PRN Celso Méndez MD   2 mg at 01/03/22 2225   • ondansetron (ZOFRAN) injection 4 mg  4 mg Intravenous Q6H PRN Celso Méndez MD   4 mg at 01/04/22 0929   • pantoprazole (PROTONIX) injection 40 mg  40 mg Intravenous Q AM Celso Méndez MD   40 mg at 01/06/22 0507   • potassium chloride (KLOR-CON) packet 40 mEq  40 mEq Oral PRN Efrain Arevalo MD       • potassium chloride (MICRO-K) CR capsule 40 mEq  40 mEq Oral PRN Efrain Arevalo MD   40 mEq at 01/03/22 2046   • prochlorperazine (COMPAZINE) injection 10 mg  10 mg Intravenous Q4H PRN Arik Haines MD   10 mg at 01/03/22 2202    Or   • prochlorperazine (COMPAZINE) tablet 5 mg  5 mg Oral Q6H PRN Arik Haines MD        Or   • prochlorperazine (COMPAZINE) suppository 25 mg  25 mg Rectal Q12H PRN Arik Haines MD       • sodium chloride 0.9 % flush 10 mL  10 mL Intravenous PRN Celso Méndez MD       • sodium chloride 0.9 % flush 10 mL  10 mL Intravenous Q12H Celso Méndez MD   10 mL at 01/05/22 2033   • sodium chloride 0.9 % flush 10 mL  10 mL Intravenous PRN Dev  "MD Celso   10 mL at 01/03/22 1805   • tamsulosin (FLOMAX) 24 hr capsule 0.4 mg  0.4 mg Oral Nightly Becka Aparicio MD       • venlafaxine (EFFEXOR) tablet 75 mg  75 mg Oral BID Celso Médnez MD   75 mg at 01/06/22 0907       Vital Sign Min/Max for last 24 hours  Temp  Min: 97.3 °F (36.3 °C)  Max: 97.6 °F (36.4 °C)   BP  Min: 106/73  Max: 159/86   Pulse  Min: 68  Max: 80   Resp  Min: 14  Max: 22   SpO2  Min: 90 %  Max: 97 %   Flow (L/min)  Min: 1  Max: 2   Weight  Min: 88.5 kg (195 lb 1.7 oz)  Max: 88.5 kg (195 lb 1.7 oz)     Flowsheet Rows      First Filed Value   Admission Height 190.5 cm (75\") Documented at 12/31/2021 1602   Admission Weight 87.1 kg (192 lb 0.3 oz) Documented at 12/31/2021 1559            Intake/Output Summary (Last 24 hours) at 1/6/2022 1111  Last data filed at 1/6/2022 0507  Gross per 24 hour   Intake 655.7 ml   Output 775 ml   Net -119.3 ml       Physical Exam:  Neck: Supple.  Positive mild JVD  Chest: Creased bilateral air entry with mild rales  Cardiovascular system:  Regular rate and rhythm, no murmurs.  Abdomen: Soft, no tenderness, bowel sounds present, no hepatosplenomegaly.  CNS: Alert, oriented to place and time.  No motor or sensory deficit.  Cranial nerves intact.  Musculoskeletal: No deformity of the back or spine.  Extremities:  No edema.  Pulses equal on both sides.     Results Review:   I reviewed the patient's new clinical results.  Lab Results   Component Value Date    WBC 8.25 01/06/2022    HGB 12.9 (L) 01/06/2022    HCT 38.4 01/06/2022    MCV 93.2 01/06/2022     (L) 01/06/2022     Lab Results   Component Value Date    GLUCOSE 137 (H) 01/06/2022    BUN 31 (H) 01/06/2022    CREATININE 1.26 01/06/2022    EGFRIFNONA 58 (L) 01/06/2022    BCR 24.6 01/06/2022    CO2 27.0 01/06/2022    CALCIUM 9.2 01/06/2022    ALBUMIN 4.00 01/06/2022     (H) 01/06/2022     (H) 01/06/2022     No results found for: TSH  Lab Results   Component Value Date    INR 1.79 (H) " 2022    PROTIME 20.4 (H) 2022     No results found for: PTT    EKG:     Telemetry:    Ejection Fraction  No results found for: EF    Echo EF Estimated  Lab Results   Component Value Date    ECHOEFEST 22 2022         Present on Admission:  • Paroxysmal atrial fibrillation with rapid ventricular response (HCC)  • Acute on chronic systolic CHF (congestive heart failure) (HCC)  • Moderate malnutrition (CMS/HCC)  • Hypotension    Assessment/Plan   #1 atrial fibrillation with rapid ventricular      Status post electrical cardioversion maintained sinus rhythm with a continue oral amiodarone continue oral anticoagulation     #2 congestive heart failure possible acute on chronic  Maintain hemodynamic symptoms well-perfused no sign of cardiac decompensation.   off dobutamine.  Tolerating lisinopril will continue 5 mg continue Toprol-XL 25 mg and low-dose diuretics.  Will consider initiations of Aldactone based on patient clinical conditions.     s       Restrict 1000 fluid     Sodium 2 g to  Patient was counseled educated avoid nonsteroid and drink        #3 moderate mitral tricuspid regurgitations will reassess after electrical cardioversions as an outpatient    Altagracia Mantilla MD  22  11:11 CST      Part of this note may be an electronic transcription/translation of spoken language to printed text using the Dragon Dictation system.        Electronically signed by Altagracia Mantilla MD at 22 1114     Becka Aparicio MD at 22 1102          [facility]  [address] [place] [address]. [id number]  T - [phone]  F - [phone]     Progress Note          PATIENT  DEMOGRAPHICS   PATIENT NAME: Kd Campa                      PHYSICIAN: Becka Aparicio MD  : 1960  MRN: [id number]   LOS: 6 days    Patient Care Team:  Estella Gutierrez MD as PCP - General (Family Medicine)  Subjective   SUBJECTIVE   Comfortable. No orthopnea         Objective   OBJECTIVE  "  Vital Signs  Temp:  [97.3 °F (36.3 °C)-97.6 °F (36.4 °C)] 97.6 °F (36.4 °C)  Heart Rate:  [68-80] 74  Resp:  [14-22] 16  BP: (106-159)/(57-99) 124/78    Flowsheet Rows      First Filed Value   Admission Height 190.5 cm (75\") Documented at 12/31/2021 1602   Admission Weight 87.1 kg (192 lb 0.3 oz) Documented at 12/31/2021 1559           I/O last 3 completed shifts:  In: 1456.7 [P.O.:120; I.V.:1036.7; IV Piggyback:300]  Out: 1600 [Urine:1600]    PHYSICAL EXAM    Physical Exam  Constitutional:       Appearance: He is well-developed.   HENT:      Head: Normocephalic.   Eyes:      Pupils: Pupils are equal, round, and reactive to light.   Cardiovascular:      Rate and Rhythm: Normal rate and regular rhythm.      Heart sounds: Normal heart sounds.   Pulmonary:      Effort: Pulmonary effort is normal.      Breath sounds: Normal breath sounds.   Abdominal:      General: Bowel sounds are normal.      Palpations: Abdomen is soft.   Musculoskeletal:         General: No swelling.   Skin:     Coloration: Skin is not jaundiced.   Neurological:      General: No focal deficit present.      Mental Status: He is alert and oriented to person, place, and time.         RESULTS   Results Review:    Results from last 7 days   Lab Units 01/06/22  0332 01/05/22  0956 01/04/22  0725   SODIUM mmol/L 138 136 135*   POTASSIUM mmol/L 3.6 3.8 4.8   CHLORIDE mmol/L 101 100 98   CO2 mmol/L 27.0 25.0 24.0   BUN mg/dL 31* 38* 36*   CREATININE mg/dL 1.26 1.74* 2.61*   CALCIUM mg/dL 9.2 8.8 9.3   BILIRUBIN mg/dL 1.8* 1.4* 1.3*   ALK PHOS U/L 61 59 66   ALT (SGPT) U/L 454* 576* 437*   AST (SGOT) U/L 339* 647* 842*   GLUCOSE mg/dL 137* 143* 154*       Estimated Creatinine Clearance: 75.1 mL/min (by C-G formula based on SCr of 1.26 mg/dL).    Results from last 7 days   Lab Units 01/02/22  0313   MAGNESIUM mg/dL 1.7             Results from last 7 days   Lab Units 01/06/22  0332 01/05/22  0956 01/04/22  0726 01/02/22  1348 12/31/21  0849   WBC 10*3/mm3 " 8.25 8.41 12.55* 10.04 7.43   HEMOGLOBIN g/dL 12.9* 13.0 14.1 15.1 15.1   PLATELETS 10*3/mm3 132* 139* 177 206 191       Results from last 7 days   Lab Units 01/03/22  0612   INR  1.79*         Imaging Results (Last 24 Hours)     ** No results found for the last 24 hours. **           MEDICATIONS    albumin human, 25 g, Intravenous, BID  amiodarone, 200 mg, Oral, BID With Meals  apixaban, 5 mg, Oral, Q12H  lactated ringers, 250 mL, Intravenous, Once  metoprolol succinate XL, 25 mg, Oral, Q24H  pantoprazole, 40 mg, Intravenous, Q AM  sodium chloride, 10 mL, Intravenous, Q12H  venlafaxine, 75 mg, Oral, BID           Assessment/Plan   ASSESSMENT / PLAN      Paroxysmal atrial fibrillation with rapid ventricular response (HCC)    Acute on chronic systolic CHF (congestive heart failure) (HCC)    Moderate malnutrition (CMS/HCC)    Hypotension       1.  CAROLYN- Baseline creatinine around 1.1, creatinine peak up to 2.6.  Etiology is likely multifactorial secondary to dye exposure from CTA, diuretics, as well as intermittent hypotension.  cr better and dc albumin.     Add oral lasix. Low dose lisinopril. Hold Flomax, spironolactone (due to low bp , carolyn).   u/s renal no hydro . fena is consistent with pre renal pictures.     2.  A. fib with RVR- cardiology following, improved following cardioversion on 1/3/2022. Has prior ablation procedure     3.  Acute on chronic CHF- EF 22% cardiology following, improved, off dobutamine     4.  Intermittent hypotension-now better      Will review lab in am and if all stable will see him in the office              This document has been electronically signed by Becka Aparicio MD on January 6, 2022 11:02 CST           Electronically signed by Becka Aparicio MD at 01/06/22 1109       Medical Student Notes (last 24 hours)  Notes from 01/06/22 0841 through 01/07/22 0841   No notes of this type exist for this encounter.         Consult Notes (last 24 hours)  Notes from 01/06/22 0841 through 01/07/22  0841   No notes of this type exist for this encounter.

## 2022-01-07 NOTE — PROGRESS NOTES
[facility] Medicine Services  INPATIENT PROGRESS NOTE    Length of Stay: 7  Date of Admission: 12/31/2021  Primary Care Physician: Estella Gutierrez MD    Subjective   Chief Complaint: Palpitations/dyspnea  HPI: Feels well, no SOA or CP/palpitations.     Review of Systems   Constitutional: Negative for chills and fever.   HENT: Negative for congestion.    Respiratory: Negative for cough, shortness of breath and wheezing.    Cardiovascular: Negative for chest pain and palpitations.   Gastrointestinal: Negative for abdominal pain, constipation, diarrhea and nausea.   Genitourinary: Negative.    Musculoskeletal: Negative.    Neurological: Negative.    Psychiatric/Behavioral: Negative.    All other systems reviewed and are negative.     All pertinent negatives and positives are as above. All other systems have been reviewed and are negative unless otherwise stated.     Objective    Temp:  [97 °F (36.1 °C)-98.8 °F (37.1 °C)] 97.7 °F (36.5 °C)  Heart Rate:  [66-90] 73  Resp:  [16-18] 16  BP: ()/(55-92) 134/92    Physical Exam  Constitutional:       General: He is not in acute distress.     Appearance: He is not toxic-appearing.   HENT:      Head: Normocephalic and atraumatic.      Right Ear: External ear normal.      Left Ear: External ear normal.      Nose: Nose normal.      Mouth/Throat:      Mouth: Mucous membranes are moist.      Pharynx: Oropharynx is clear.   Eyes:      Conjunctiva/sclera: Conjunctivae normal.   Cardiovascular:      Rate and Rhythm: Normal rate and regular rhythm.      Pulses: Normal pulses.      Heart sounds: Normal heart sounds.   Pulmonary:      Effort: Pulmonary effort is normal. No respiratory distress.      Breath sounds: Normal breath sounds.   Abdominal:      General: Bowel sounds are normal.      Palpations: Abdomen is soft.      Tenderness: There is no abdominal tenderness.   Musculoskeletal:         General: No swelling.   Skin:     General:  Skin is warm and dry.      Capillary Refill: Capillary refill takes less than 2 seconds.   Neurological:      General: No focal deficit present.      Mental Status: He is alert and oriented to person, place, and time. Mental status is at baseline.   Psychiatric:         Behavior: Behavior normal.         Thought Content: Thought content normal.         Results Review:  I have reviewed the labs, radiology results, and diagnostic studies.    Laboratory Data:   Results from last 7 days   Lab Units 01/07/22  0458 01/06/22  0332 01/05/22  0956   SODIUM mmol/L 138 138 136   POTASSIUM mmol/L 3.9 3.6 3.8   CHLORIDE mmol/L 100 101 100   CO2 mmol/L 26.0 27.0 25.0   BUN mg/dL 25* 31* 38*   CREATININE mg/dL 1.10 1.26 1.74*   GLUCOSE mg/dL 122* 137* 143*   CALCIUM mg/dL 9.3 9.2 8.8   BILIRUBIN mg/dL 2.3* 1.8* 1.4*   ALK PHOS U/L 61 61 59   ALT (SGPT) U/L 358* 454* 576*   AST (SGOT) U/L 202* 339* 647*   ANION GAP mmol/L 12.0 10.0 11.0     Estimated Creatinine Clearance: 86 mL/min (by C-G formula based on SCr of 1.1 mg/dL).  Results from last 7 days   Lab Units 01/02/22  0313   MAGNESIUM mg/dL 1.7         Results from last 7 days   Lab Units 01/07/22  0458 01/06/22  0332 01/05/22  0956 01/04/22  0726 01/02/22  1348   WBC 10*3/mm3 8.45 8.25 8.41 12.55* 10.04   HEMOGLOBIN g/dL 13.2 12.9* 13.0 14.1 15.1   HEMATOCRIT % 38.6 38.4 38.2 42.1 47.8   PLATELETS 10*3/mm3 131* 132* 139* 177 206     Results from last 7 days   Lab Units 01/03/22  0612   INR  1.79*       Culture Data:   No results found for: BLOODCX  No results found for: URINECX  No results found for: RESPCX  No results found for: WOUNDCX  No results found for: STOOLCX  No components found for: BODYFLD    Radiology Data:   Imaging Results (Last 24 Hours)     ** No results found for the last 24 hours. **          I have reviewed the patient's current medications.     Assessment/Plan     Principal Problem:    Paroxysmal atrial fibrillation with rapid ventricular response  ([facility])  Active Problems:    Acute on chronic systolic CHF (congestive heart failure) ([facility])    Moderate malnutrition (CMS/[facility])    Hypotension    Plan:  -Appreciate cardiology's assistance, s/p cardioversion on 1/3/2022.   -Continue with amiodarone along with lisinopril and Toprol-XL  -Continue fluid restriction  -Off of dobutamine, continue low dose lasix  -Echo still shows poor EF so plan is now to get a life vest and see about doing heart cath while still hospitalized.   -Xanax available as well for anxiety as this is likely contributing to his shortness of breath.   -Appreciate nephrology's assistance  -DVT prophylaxis with Lovenox pending heart cath.   -CODE STATUS: Full    I confirmed that the patient's Advance Care Plan is present, code status is documented, or surrogate decision maker is listed in the patient's medical record.     Discharge Planning: In process, plan for heart cath while inpatient at this time.     Efrain Arevalo MD

## 2022-01-07 NOTE — PLAN OF CARE
Goal Outcome Evaluation:  Plan of Care Reviewed With: patient        Progress: improving  Outcome Summary: VSS; rested well during the night; Will continue to monitor.

## 2022-01-07 NOTE — PAYOR COMM NOTE
"Michelle Fairchild  Case Management Extender  [phone] phone  [phone] fax    Ref# [id number]      Annamarie Campa (61 y.o. Male)             Date of Birth Social Security Number Address Home Phone MRN    1960  [address] [facility] [address] [phone] [id number]    Rastafari Marital Status             Confucianism        Admission Date Admission Type Admitting Provider Attending Provider Department, Room/Bed    12/31/21 Emergency Celso Méndez MD Popescu, Tudor, MD [facility] CRITICAL CARE STEPDOWN, 15/A    Discharge Date Discharge Disposition Discharge Destination                         Attending Provider: Celso Méndez MD    Allergies: No Known Allergies    Isolation: None   Infection: None   Code Status: CPR   Advance Care Planning Activity    Ht: 190.5 cm (75\")   Wt: 88.5 kg (195 lb 1.7 oz)    Admission Cmt: None   Principal Problem: Paroxysmal atrial fibrillation with rapid ventricular response (HCC) [I48.0]                 Active Insurance as of 12/31/2021     Primary Coverage     Payor Plan Insurance Group Employer/Plan Group    AETNA MEDICARE REPLACEMENT AETNA MEDICARE REPLACEMENT [id number]     Payor Plan Address Payor Plan Phone Number Payor Plan Fax Number Effective Dates    [address] [phone]  9/1/2021 - 12/31/2021    [facility] [id number]       Subscriber Name Subscriber Birth Date Member ID       ANNAMARIE CAMPA 1960 [id number]                 Emergency Contacts      (Rel.) Home Phone Work Phone Mobile Phone    Val Doshi (Daughter) [phone] -- [phone]    Jd Campa (Son) -- -- [phone]    lissy leyva (Sister) -- -- [phone]               Physician Progress Notes (last 7 days)      Efrain Arevalo MD at 01/07/22 1109              [facility] Medicine Services  INPATIENT PROGRESS NOTE    Length of Stay: 7  Date of Admission: " 12/31/2021  Primary Care Physician: Estella Gutierrez MD    Subjective   Chief Complaint: Palpitations/dyspnea  HPI: Feels well, no SOA or CP/palpitations.     Review of Systems   Constitutional: Negative for chills and fever.   HENT: Negative for congestion.    Respiratory: Negative for cough, shortness of breath and wheezing.    Cardiovascular: Negative for chest pain and palpitations.   Gastrointestinal: Negative for abdominal pain, constipation, diarrhea and nausea.   Genitourinary: Negative.    Musculoskeletal: Negative.    Neurological: Negative.    Psychiatric/Behavioral: Negative.    All other systems reviewed and are negative.     All pertinent negatives and positives are as above. All other systems have been reviewed and are negative unless otherwise stated.     Objective    Temp:  [97 °F (36.1 °C)-98.8 °F (37.1 °C)] 97.7 °F (36.5 °C)  Heart Rate:  [66-90] 73  Resp:  [16-18] 16  BP: ()/(55-92) 134/92    Physical Exam  Constitutional:       General: He is not in acute distress.     Appearance: He is not toxic-appearing.   HENT:      Head: Normocephalic and atraumatic.      Right Ear: External ear normal.      Left Ear: External ear normal.      Nose: Nose normal.      Mouth/Throat:      Mouth: Mucous membranes are moist.      Pharynx: Oropharynx is clear.   Eyes:      Conjunctiva/sclera: Conjunctivae normal.   Cardiovascular:      Rate and Rhythm: Normal rate and regular rhythm.      Pulses: Normal pulses.      Heart sounds: Normal heart sounds.   Pulmonary:      Effort: Pulmonary effort is normal. No respiratory distress.      Breath sounds: Normal breath sounds.   Abdominal:      General: Bowel sounds are normal.      Palpations: Abdomen is soft.      Tenderness: There is no abdominal tenderness.   Musculoskeletal:         General: No swelling.   Skin:     General: Skin is warm and dry.      Capillary Refill: Capillary refill takes less than 2 seconds.   Neurological:      General: No focal deficit  present.      Mental Status: He is alert and oriented to person, place, and time. Mental status is at baseline.   Psychiatric:         Behavior: Behavior normal.         Thought Content: Thought content normal.         Results Review:  I have reviewed the labs, radiology results, and diagnostic studies.    Laboratory Data:   Results from last 7 days   Lab Units 01/07/22  0458 01/06/22  0332 01/05/22  0956   SODIUM mmol/L 138 138 136   POTASSIUM mmol/L 3.9 3.6 3.8   CHLORIDE mmol/L 100 101 100   CO2 mmol/L 26.0 27.0 25.0   BUN mg/dL 25* 31* 38*   CREATININE mg/dL 1.10 1.26 1.74*   GLUCOSE mg/dL 122* 137* 143*   CALCIUM mg/dL 9.3 9.2 8.8   BILIRUBIN mg/dL 2.3* 1.8* 1.4*   ALK PHOS U/L 61 61 59   ALT (SGPT) U/L 358* 454* 576*   AST (SGOT) U/L 202* 339* 647*   ANION GAP mmol/L 12.0 10.0 11.0     Estimated Creatinine Clearance: 86 mL/min (by C-G formula based on SCr of 1.1 mg/dL).  Results from last 7 days   Lab Units 01/02/22  0313   MAGNESIUM mg/dL 1.7         Results from last 7 days   Lab Units 01/07/22  0458 01/06/22  0332 01/05/22  0956 01/04/22  0726 01/02/22  1348   WBC 10*3/mm3 8.45 8.25 8.41 12.55* 10.04   HEMOGLOBIN g/dL 13.2 12.9* 13.0 14.1 15.1   HEMATOCRIT % 38.6 38.4 38.2 42.1 47.8   PLATELETS 10*3/mm3 131* 132* 139* 177 206     Results from last 7 days   Lab Units 01/03/22  0612   INR  1.79*       Culture Data:   No results found for: BLOODCX  No results found for: URINECX  No results found for: RESPCX  No results found for: WOUNDCX  No results found for: STOOLCX  No components found for: BODYFLD    Radiology Data:   Imaging Results (Last 24 Hours)     ** No results found for the last 24 hours. **          I have reviewed the patient's current medications.     Assessment/Plan     Principal Problem:    Paroxysmal atrial fibrillation with rapid ventricular response (HCC)  Active Problems:    Acute on chronic systolic CHF (congestive heart failure) (HCC)    Moderate malnutrition (CMS/HCC)     Hypotension    Plan:  -Appreciate cardiology's assistance, s/p cardioversion on 1/3/2022.   -Continue with amiodarone along with lisinopril and Toprol-XL  -Continue fluid restriction  -Off of dobutamine, continue low dose lasix  -Echo still shows poor EF so plan is now to get a life vest and see about doing heart cath while still hospitalized.   -Xanax available as well for anxiety as this is likely contributing to his shortness of breath.   -Appreciate nephrology's assistance  -DVT prophylaxis with Lovenox pending heart cath.   -CODE STATUS: Full    I confirmed that the patient's Advance Care Plan is present, code status is documented, or surrogate decision maker is listed in the patient's medical record.     Discharge Planning: In process, plan for heart cath while inpatient at this time.     Efrain Arevalo MD      Electronically signed by Efrain Arevalo MD at 01/07/22 1116     Efrain Arevalo MD at 01/06/22 1421              [facility] Medicine Services  INPATIENT PROGRESS NOTE    Length of Stay: 6  Date of Admission: 12/31/2021  Primary Care Physician: Estella Gutierrez MD    Subjective   Chief Complaint: Palpitations/dyspnea  HPI: Doing well and feels like his breathing is improved.  Off of dobutamine now.  Renal function improving.    Review of Systems   Constitutional: Negative for chills and fever.   HENT: Negative for congestion.    Respiratory: Positive for shortness of breath. Negative for cough and wheezing.    Cardiovascular: Negative for chest pain and palpitations.   Gastrointestinal: Negative for abdominal pain, constipation, diarrhea and nausea.   Genitourinary: Negative.    Musculoskeletal: Negative.    Neurological: Negative.    Psychiatric/Behavioral: Negative.    All other systems reviewed and are negative.     All pertinent negatives and positives are as above. All other systems have been reviewed and are negative unless otherwise stated.     Objective     Temp:  [97.3 °F (36.3 °C)-97.6 °F (36.4 °C)] 97.6 °F (36.4 °C)  Heart Rate:  [68-80] 75  Resp:  [14-22] 16  BP: (106-159)/(57-99) 150/86    Physical Exam  Constitutional:       General: He is not in acute distress.     Appearance: He is not toxic-appearing.   HENT:      Head: Normocephalic and atraumatic.      Right Ear: External ear normal.      Left Ear: External ear normal.      Nose: Nose normal.      Mouth/Throat:      Mouth: Mucous membranes are moist.      Pharynx: Oropharynx is clear.   Eyes:      Conjunctiva/sclera: Conjunctivae normal.   Cardiovascular:      Rate and Rhythm: Normal rate and regular rhythm.      Pulses: Normal pulses.      Heart sounds: Normal heart sounds.   Pulmonary:      Effort: Pulmonary effort is normal. No respiratory distress.      Breath sounds: Normal breath sounds.   Abdominal:      General: Bowel sounds are normal.      Palpations: Abdomen is soft.      Tenderness: There is no abdominal tenderness.   Musculoskeletal:         General: No swelling.   Skin:     General: Skin is warm and dry.      Capillary Refill: Capillary refill takes less than 2 seconds.   Neurological:      General: No focal deficit present.      Mental Status: He is alert and oriented to person, place, and time. Mental status is at baseline.   Psychiatric:         Behavior: Behavior normal.         Thought Content: Thought content normal.         Results Review:  I have reviewed the labs, radiology results, and diagnostic studies.    Laboratory Data:   Results from last 7 days   Lab Units 01/06/22  0332 01/05/22  0956 01/04/22  0725   SODIUM mmol/L 138 136 135*   POTASSIUM mmol/L 3.6 3.8 4.8   CHLORIDE mmol/L 101 100 98   CO2 mmol/L 27.0 25.0 24.0   BUN mg/dL 31* 38* 36*   CREATININE mg/dL 1.26 1.74* 2.61*   GLUCOSE mg/dL 137* 143* 154*   CALCIUM mg/dL 9.2 8.8 9.3   BILIRUBIN mg/dL 1.8* 1.4* 1.3*   ALK PHOS U/L 61 59 66   ALT (SGPT) U/L 454* 576* 437*   AST (SGOT) U/L 339* 647* 842*   ANION GAP mmol/L 10.0  11.0 13.0     Estimated Creatinine Clearance: 75.1 mL/min (by C-G formula based on SCr of 1.26 mg/dL).  Results from last 7 days   Lab Units 01/02/22  0313   MAGNESIUM mg/dL 1.7         Results from last 7 days   Lab Units 01/06/22  0332 01/05/22  0956 01/04/22  0726 01/02/22  1348 12/31/21  0849   WBC 10*3/mm3 8.25 8.41 12.55* 10.04 7.43   HEMOGLOBIN g/dL 12.9* 13.0 14.1 15.1 15.1   HEMATOCRIT % 38.4 38.2 42.1 47.8 44.9   PLATELETS 10*3/mm3 132* 139* 177 206 191     Results from last 7 days   Lab Units 01/03/22  0612   INR  1.79*       Culture Data:   No results found for: BLOODCX  No results found for: URINECX  No results found for: RESPCX  No results found for: WOUNDCX  No results found for: STOOLCX  No components found for: BODYFLD    Radiology Data:   Imaging Results (Last 24 Hours)     ** No results found for the last 24 hours. **          I have reviewed the patient's current medications.     Assessment/Plan     Principal Problem:    Paroxysmal atrial fibrillation with rapid ventricular response ([facility])  Active Problems:    Acute on chronic systolic CHF (congestive heart failure) ([facility])    Moderate malnutrition (CMS/HCC)    Hypotension    Plan:  -Appreciate cardiology's assistance, s/p cardioversion on 1/3/2022.   -Continue with amiodarone along with lisinopril and Toprol-XL  -Continue fluid restriction  -Off of dobutamine and low-dose Lasix has been managed as well.  -Xanax available as well for anxiety as this is likely contributing to his shortness of breath.   -Appreciate nephrology's assistance  -DVT prophylaxis with Eliquis  -CODE STATUS: Full    I confirmed that the patient's Advance Care Plan is present, code status is documented, or surrogate decision maker is listed in the patient's medical record.     Discharge Planning: In process, okay to transfer to the floor from my perspective.  May be able to discharge tomorrow.    Efrain Arevalo MD      Electronically signed by Efrain Arevalo MD at 01/06/22  1430     Altagracia Mantilla MD at 01/06/22 1111           LOS: 6 days   Patient Care Team:  Estella Gutierrez MD as PCP - General (Family Medicine)    Chief Complaint: States electrical cardioversion maintained sinus rhythm significant improvement in hemodynamic.  Continue amiodarone 200 mg patient started on low-dose lisinopril and metoprolol tolerating very well.  61 years old patient with background history of paroxysmal atrial fibrillation s/p EP study and A. fib ablation in the [place] area more than 3 years ago and a history of congestive heart failure have some weak muscle was on Lasix and blood pressure medicine discontinued.  The patient presented for evaluation increasing shortness of breath functional class III no limited orthopnea or PND noted in atrial fibrillation with rapid ventricular rate and clinically patient was in congestive heart failure with elevated BNP mild lower extremity edema and chest x-ray bilateral pleural effusion no evidence of pulmonary embolism on CT pulmonary angiogram.        Review of Systems:     Constitution:  Denies any fatigue, fever or chills.  Positive for activity change positive for fatigue    HENT:  Denies any headache, hearing impairment.    Eyes:  Denies any blurring of vision     Cardiovascular:  As per history of present illness.     Respiratory: Exertional dyspnea functional class III       Gastrointestinal:  No nausea, vomiting, or melena.    Extremity: No edema, positive pulses    Objective     Current Facility-Administered Medications   Medication Dose Route Frequency Provider Last Rate Last Admin   • ALPRAZolam (XANAX) tablet 0.5 mg  0.5 mg Oral TID PRN Arie Lockhart MD   0.5 mg at 01/03/22 2335   • amiodarone (PACERONE) tablet 200 mg  200 mg Oral BID With Meals Griselda Cummins APRN   200 mg at 01/06/22 0907   • apixaban (ELIQUIS) tablet 5 mg  5 mg Oral Q12H Griselda Cummins APRN       • furosemide (LASIX) tablet 20 mg  20 mg Oral BID Markus,  MD Becka       • lactated ringers bolus 250 mL  250 mL Intravenous Once Arie Lockhart MD   Held at 01/04/22 0739   • lisinopril (PRINIVIL,ZESTRIL) tablet 5 mg  5 mg Oral Q24H Becka Aparicio MD       • Magnesium Sulfate 2 gram Bolus, followed by 8 gram infusion (total Mg dose 10 grams)- Mg less than or equal to 1mg/dL  2 g Intravenous PRN Efrain Arevalo MD        Or   • Magnesium Sulfate 2 gram / 50mL Infusion (GIVE X 3 BAGS TO EQUAL 6GM TOTAL DOSE) - Mg 1.1 - 1.5 mg/dl  2 g Intravenous PRN Efrain Arevalo MD        Or   • Magnesium Sulfate 4 gram infusion- Mg 1.6-1.9 mg/dL  4 g Intravenous PRN Efrain Arevalo MD       • metoprolol succinate XL (TOPROL-XL) 24 hr tablet 25 mg  25 mg Oral Q24H Altagracia Mantilla MD   25 mg at 01/06/22 0907   • morphine injection 2 mg  2 mg Intravenous Q2H PRN Celso Méndez MD   2 mg at 01/03/22 2225   • ondansetron (ZOFRAN) injection 4 mg  4 mg Intravenous Q6H PRN Celso Méndez MD   4 mg at 01/04/22 0929   • pantoprazole (PROTONIX) injection 40 mg  40 mg Intravenous Q AM Celso Méndez MD   40 mg at 01/06/22 0507   • potassium chloride (KLOR-CON) packet 40 mEq  40 mEq Oral PRN Efrain Arevalo MD       • potassium chloride (MICRO-K) CR capsule 40 mEq  40 mEq Oral PRN Efrain Arevalo MD   40 mEq at 01/03/22 2046   • prochlorperazine (COMPAZINE) injection 10 mg  10 mg Intravenous Q4H PRN Arik Haines MD   10 mg at 01/03/22 2202    Or   • prochlorperazine (COMPAZINE) tablet 5 mg  5 mg Oral Q6H PRN Arik Haines MD        Or   • prochlorperazine (COMPAZINE) suppository 25 mg  25 mg Rectal Q12H PRN Arik Haines MD       • sodium chloride 0.9 % flush 10 mL  10 mL Intravenous PRN Celso Méndez MD       • sodium chloride 0.9 % flush 10 mL  10 mL Intravenous Q12H Celso Méndez MD   10 mL at 01/05/22 2033   • sodium chloride 0.9 % flush 10 mL  10 mL Intravenous PRN Celso Méndez MD   10 mL at 01/03/22 1805   • tamsulosin (FLOMAX) 24 hr capsule 0.4 mg  0.4 mg  "Oral Nightly Becka Aparicio MD       • venlafaxine (EFFEXOR) tablet 75 mg  75 mg Oral BID Celso Méndez MD   75 mg at 01/06/22 0907       Vital Sign Min/Max for last 24 hours  Temp  Min: 97.3 °F (36.3 °C)  Max: 97.6 °F (36.4 °C)   BP  Min: 106/73  Max: 159/86   Pulse  Min: 68  Max: 80   Resp  Min: 14  Max: 22   SpO2  Min: 90 %  Max: 97 %   Flow (L/min)  Min: 1  Max: 2   Weight  Min: 88.5 kg (195 lb 1.7 oz)  Max: 88.5 kg (195 lb 1.7 oz)     Flowsheet Rows      First Filed Value   Admission Height 190.5 cm (75\") Documented at 12/31/2021 1602   Admission Weight 87.1 kg (192 lb 0.3 oz) Documented at 12/31/2021 1559            Intake/Output Summary (Last 24 hours) at 1/6/2022 1111  Last data filed at 1/6/2022 0507  Gross per 24 hour   Intake 655.7 ml   Output 775 ml   Net -119.3 ml       Physical Exam:  Neck: Supple.  Positive mild JVD  Chest: Creased bilateral air entry with mild rales  Cardiovascular system:  Regular rate and rhythm, no murmurs.  Abdomen: Soft, no tenderness, bowel sounds present, no hepatosplenomegaly.  CNS: Alert, oriented to place and time.  No motor or sensory deficit.  Cranial nerves intact.  Musculoskeletal: No deformity of the back or spine.  Extremities:  No edema.  Pulses equal on both sides.     Results Review:   I reviewed the patient's new clinical results.  Lab Results   Component Value Date    WBC 8.25 01/06/2022    HGB 12.9 (L) 01/06/2022    HCT 38.4 01/06/2022    MCV 93.2 01/06/2022     (L) 01/06/2022     Lab Results   Component Value Date    GLUCOSE 137 (H) 01/06/2022    BUN 31 (H) 01/06/2022    CREATININE 1.26 01/06/2022    EGFRIFNONA 58 (L) 01/06/2022    BCR 24.6 01/06/2022    CO2 27.0 01/06/2022    CALCIUM 9.2 01/06/2022    ALBUMIN 4.00 01/06/2022     (H) 01/06/2022     (H) 01/06/2022     No results found for: TSH  Lab Results   Component Value Date    INR 1.79 (H) 01/03/2022    PROTIME 20.4 (H) 01/03/2022     No results found for: PTT    EKG: "     Telemetry:    Ejection Fraction  No results found for: EF    Echo EF Estimated  Lab Results   Component Value Date    ECHOEFEST 22 2022         Present on Admission:  • Paroxysmal atrial fibrillation with rapid ventricular response (HCC)  • Acute on chronic systolic CHF (congestive heart failure) (HCC)  • Moderate malnutrition (CMS/HCC)  • Hypotension    Assessment/Plan   #1 atrial fibrillation with rapid ventricular      Status post electrical cardioversion maintained sinus rhythm with a continue oral amiodarone continue oral anticoagulation     #2 congestive heart failure possible acute on chronic  Maintain hemodynamic symptoms well-perfused no sign of cardiac decompensation.   off dobutamine.  Tolerating lisinopril will continue 5 mg continue Toprol-XL 25 mg and low-dose diuretics.  Will consider initiations of Aldactone based on patient clinical conditions.     s       Restrict 1000 fluid     Sodium 2 g to  Patient was counseled educated avoid nonsteroid and drink        #3 moderate mitral tricuspid regurgitations will reassess after electrical cardioversions as an outpatient    Altagracia Mantilla MD  22  11:11 CST      Part of this note may be an electronic transcription/translation of spoken language to printed text using the Dragon Dictation system.        Electronically signed by Altagracia Mantilla MD at 22 1114     Becka Aparicio MD at 22 1102          [facility]  [address] [place] [address]. [address]  T - [phone]  F - [phone]     Progress Note          PATIENT  DEMOGRAPHICS   PATIENT NAME: Kd Xiong Campa                      PHYSICIAN: Becka Aparicio MD  : 1960  MRN: [id number]   LOS: 6 days    Patient Care Team:  Estella Gutierrez MD as PCP - General (Family Medicine)  Subjective   SUBJECTIVE   Comfortable. No orthopnea         Objective   OBJECTIVE   Vital Signs  Temp:  [97.3 °F (36.3 °C)-97.6 °F (36.4 °C)] 97.6 °F (36.4 °C)  Heart  "Rate:  [68-80] 74  Resp:  [14-22] 16  BP: (106-159)/(57-99) 124/78    Flowsheet Rows      First Filed Value   Admission Height 190.5 cm (75\") Documented at 12/31/2021 1602   Admission Weight 87.1 kg (192 lb 0.3 oz) Documented at 12/31/2021 1559           I/O last 3 completed shifts:  In: 1456.7 [P.O.:120; I.V.:1036.7; IV Piggyback:300]  Out: 1600 [Urine:1600]    PHYSICAL EXAM    Physical Exam  Constitutional:       Appearance: He is well-developed.   HENT:      Head: Normocephalic.   Eyes:      Pupils: Pupils are equal, round, and reactive to light.   Cardiovascular:      Rate and Rhythm: Normal rate and regular rhythm.      Heart sounds: Normal heart sounds.   Pulmonary:      Effort: Pulmonary effort is normal.      Breath sounds: Normal breath sounds.   Abdominal:      General: Bowel sounds are normal.      Palpations: Abdomen is soft.   Musculoskeletal:         General: No swelling.   Skin:     Coloration: Skin is not jaundiced.   Neurological:      General: No focal deficit present.      Mental Status: He is alert and oriented to person, place, and time.         RESULTS   Results Review:    Results from last 7 days   Lab Units 01/06/22  0332 01/05/22  0956 01/04/22  0725   SODIUM mmol/L 138 136 135*   POTASSIUM mmol/L 3.6 3.8 4.8   CHLORIDE mmol/L 101 100 98   CO2 mmol/L 27.0 25.0 24.0   BUN mg/dL 31* 38* 36*   CREATININE mg/dL 1.26 1.74* 2.61*   CALCIUM mg/dL 9.2 8.8 9.3   BILIRUBIN mg/dL 1.8* 1.4* 1.3*   ALK PHOS U/L 61 59 66   ALT (SGPT) U/L 454* 576* 437*   AST (SGOT) U/L 339* 647* 842*   GLUCOSE mg/dL 137* 143* 154*       Estimated Creatinine Clearance: 75.1 mL/min (by C-G formula based on SCr of 1.26 mg/dL).    Results from last 7 days   Lab Units 01/02/22  0313   MAGNESIUM mg/dL 1.7             Results from last 7 days   Lab Units 01/06/22  0332 01/05/22  0956 01/04/22  0726 01/02/22  1348 12/31/21  0849   WBC 10*3/mm3 8.25 8.41 12.55* 10.04 7.43   HEMOGLOBIN g/dL 12.9* 13.0 14.1 15.1 15.1   PLATELETS " 10*3/mm3 132* 139* 177 206 191       Results from last 7 days   Lab Units 01/03/22  0612   INR  1.79*         Imaging Results (Last 24 Hours)     ** No results found for the last 24 hours. **           MEDICATIONS    albumin human, 25 g, Intravenous, BID  amiodarone, 200 mg, Oral, BID With Meals  apixaban, 5 mg, Oral, Q12H  lactated ringers, 250 mL, Intravenous, Once  metoprolol succinate XL, 25 mg, Oral, Q24H  pantoprazole, 40 mg, Intravenous, Q AM  sodium chloride, 10 mL, Intravenous, Q12H  venlafaxine, 75 mg, Oral, BID           Assessment/Plan   ASSESSMENT / PLAN      Paroxysmal atrial fibrillation with rapid ventricular response (HCC)    Acute on chronic systolic CHF (congestive heart failure) ([facility])    Moderate malnutrition (CMS/HCC)    Hypotension       1.  CAROLYN- Baseline creatinine around 1.1, creatinine peak up to 2.6.  Etiology is likely multifactorial secondary to dye exposure from CTA, diuretics, as well as intermittent hypotension.  cr better and dc albumin.     Add oral lasix. Low dose lisinopril. Hold Flomax, spironolactone (due to low bp , carolyn).   u/s renal no hydro . fena is consistent with pre renal pictures.     2.  A. fib with RVR- cardiology following, improved following cardioversion on 1/3/2022. Has prior ablation procedure     3.  Acute on chronic CHF- EF 22% cardiology following, improved, off dobutamine     4.  Intermittent hypotension-now better      Will review lab in am and if all stable will see him in the office              This document has been electronically signed by Becka Aparicio MD on January 6, 2022 11:02 CST           Electronically signed by Becka Aparicio MD at 01/06/22 1109     Efrain Arevalo MD at 01/05/22 1723              [facility] Medicine Services  INPATIENT PROGRESS NOTE    Length of Stay: 5  Date of Admission: 12/31/2021  Primary Care Physician: Estella Gutierrez MD    Subjective   Chief Complaint: Palpitations/dyspnea  HPI: Seen  and examined and reports feeling some better today.  No other new concerns reported.    Review of Systems   Constitutional: Negative for chills and fever.   HENT: Negative for congestion.    Respiratory: Positive for shortness of breath. Negative for cough and wheezing.    Cardiovascular: Negative for chest pain and palpitations.   Gastrointestinal: Negative for abdominal pain, constipation, diarrhea and nausea.   Genitourinary: Negative.    Musculoskeletal: Negative.    Neurological: Negative.    Psychiatric/Behavioral: Negative.    All other systems reviewed and are negative.     All pertinent negatives and positives are as above. All other systems have been reviewed and are negative unless otherwise stated.     Objective    Temp:  [97.4 °F (36.3 °C)-97.6 °F (36.4 °C)] 97.5 °F (36.4 °C)  Heart Rate:  [65-78] 68  Resp:  [14-21] 17  BP: (100-143)/(57-93) 111/66    Physical Exam  Constitutional:       General: He is not in acute distress.     Appearance: He is not toxic-appearing.   HENT:      Head: Normocephalic and atraumatic.      Right Ear: External ear normal.      Left Ear: External ear normal.      Nose: Nose normal.      Mouth/Throat:      Mouth: Mucous membranes are moist.      Pharynx: Oropharynx is clear.   Eyes:      Conjunctiva/sclera: Conjunctivae normal.   Cardiovascular:      Rate and Rhythm: Normal rate and regular rhythm.      Pulses: Normal pulses.      Heart sounds: Normal heart sounds.   Pulmonary:      Effort: Pulmonary effort is normal. No respiratory distress.      Breath sounds: Normal breath sounds.   Abdominal:      General: Bowel sounds are normal.      Palpations: Abdomen is soft.      Tenderness: There is no abdominal tenderness.   Musculoskeletal:         General: No swelling.   Skin:     General: Skin is warm and dry.      Capillary Refill: Capillary refill takes less than 2 seconds.   Neurological:      General: No focal deficit present.      Mental Status: He is alert and oriented to  person, place, and time. Mental status is at baseline.   Psychiatric:         Behavior: Behavior normal.         Thought Content: Thought content normal.         Results Review:  I have reviewed the labs, radiology results, and diagnostic studies.    Laboratory Data:   Results from last 7 days   Lab Units 01/05/22  0956 01/04/22  0725 01/03/22  0612 01/02/22  0313 12/31/21  0849   SODIUM mmol/L 136 135* 140   < > 139   POTASSIUM mmol/L 3.8 4.8 3.5   < > 3.8   CHLORIDE mmol/L 100 98 101   < > 103   CO2 mmol/L 25.0 24.0 26.0   < > 24.0   BUN mg/dL 38* 36* 30*   < > 22   CREATININE mg/dL 1.74* 2.61* 1.60*   < > 1.12   GLUCOSE mg/dL 143* 154* 103*   < > 142*   CALCIUM mg/dL 8.8 9.3 8.9   < > 9.1   BILIRUBIN mg/dL 1.4* 1.3*  --   --  1.3*   ALK PHOS U/L 59 66  --   --  87   ALT (SGPT) U/L 576* 437*  --   --  16   AST (SGOT) U/L 647* 842*  --   --  23   ANION GAP mmol/L 11.0 13.0 13.0   < > 12.0    < > = values in this interval not displayed.     Estimated Creatinine Clearance: 54.6 mL/min (A) (by C-G formula based on SCr of 1.74 mg/dL (H)).  Results from last 7 days   Lab Units 01/02/22  0313   MAGNESIUM mg/dL 1.7         Results from last 7 days   Lab Units 01/05/22  0956 01/04/22  0726 01/02/22  1348 12/31/21  0849 12/30/21  1101   WBC 10*3/mm3 8.41 12.55* 10.04 7.43 7.38   HEMOGLOBIN g/dL 13.0 14.1 15.1 15.1 15.3   HEMATOCRIT % 38.2 42.1 47.8 44.9 45.6   PLATELETS 10*3/mm3 139* 177 206 191 192     Results from last 7 days   Lab Units 01/03/22  0612   INR  1.79*       Culture Data:   No results found for: BLOODCX  No results found for: URINECX  No results found for: RESPCX  No results found for: WOUNDCX  No results found for: STOOLCX  No components found for: BODYFLD    Radiology Data:   Imaging Results (Last 24 Hours)     ** No results found for the last 24 hours. **          I have reviewed the patient's current medications.     Assessment/Plan     Principal Problem:    Paroxysmal atrial fibrillation with rapid  ventricular response ([facility])  Active Problems:    Acute on chronic systolic CHF (congestive heart failure) ([facility])    Moderate malnutrition (CMS/[facility])    Hypotension    Plan:  -Appreciate cardiology's assistance, s/p cardioversion on 1/3/2022.   -Continue with amiodarone   -Continue fluid restriction  -Continue dobutamine per current recommendations  -Xanax available as well for anxiety as this is likely contributing to his shortness of breath.   -Appreciate nephrology's assistance  -DVT prophylaxis with Eliquis  -CODE STATUS: Full    I confirmed that the patient's Advance Care Plan is present, code status is documented, or surrogate decision maker is listed in the patient's medical record.     Discharge Planning: In process, continue CCU care for now.     Efrain Arevalo MD      Electronically signed by Efrain Arevalo MD at 01/05/22 4029     Altagracia Mantilla MD at 01/05/22 7542           LOS: 5 days   Patient Care Team:  Estella Gutierrez MD as PCP - General (Family Medicine)    Chief Complaint: History of electrical cardioversion maintaining sinus rhythm.  Patient had episode of hypotension that was managed with albumin dobutamine.  Creatinine went up may be multifactorial renal output is appreciated recommend to hold Lasix and ACE/ARB  61 years old patient with background history of paroxysmal atrial fibrillation s/p EP study and A. fib ablation in the [place] area more than 3 years ago and a history of congestive heart failure have some weak muscle was on Lasix and blood pressure medicine discontinued.  The patient presented for evaluation increasing shortness of breath functional class III no limited orthopnea or PND noted in atrial fibrillation with rapid ventricular rate and clinically patient was in congestive heart failure with elevated BNP mild lower extremity edema and chest x-ray bilateral pleural effusion no evidence of pulmonary embolism on CT pulmonary angiogram.  Troponin is normal.  Patient received 80  mg Lasix yesterday with a significant provement in shortness of breath and good diuresis.  He has marginal hemodynamic and we are unable to uptitrate beta-blocker and to start Aldactone or ACE/ARB at this stage.      Review of Systems:     Constitution:  Denies any fatigue, fever or chills.  Positive for activity change positive for fatigue    HENT:  Denies any headache, hearing impairment.    Eyes:  Denies any blurring of vision     Cardiovascular:  As per history of present illness.     Respiratory: Exertional dyspnea functional class III       Gastrointestinal:  No nausea, vomiting, or melena.    Extremity: No edema, positive pulses    Objective     Current Facility-Administered Medications   Medication Dose Route Frequency Provider Last Rate Last Admin   • albumin human 25 % IV SOLN 25 g  25 g Intravenous BID Efrain Arevalo MD   25 g at 01/05/22 1006   • ALPRAZolam (XANAX) tablet 0.5 mg  0.5 mg Oral TID PRN Arie Lockhart MD   0.5 mg at 01/03/22 2335   • amiodarone (PACERONE) tablet 200 mg  200 mg Oral BID With Meals Griselda Cummins APRN   200 mg at 01/05/22 1006   • apixaban (ELIQUIS) tablet 5 mg  5 mg Oral Q12H Celso Méndez MD   5 mg at 01/05/22 0812   • DOBUTamine (DOBUTREX) 1 mg/mL infusion  2.5 mcg/kg/min Intravenous Titrated Griselda Cummins APRN 14.54 mL/hr at 01/05/22 1004 2.5 mcg/kg/min at 01/05/22 1004   • lactated ringers bolus 250 mL  250 mL Intravenous Once Arie Lockhart MD   Held at 01/04/22 0739   • Magnesium Sulfate 2 gram Bolus, followed by 8 gram infusion (total Mg dose 10 grams)- Mg less than or equal to 1mg/dL  2 g Intravenous PRN Efrain Arevalo MD        Or   • Magnesium Sulfate 2 gram / 50mL Infusion (GIVE X 3 BAGS TO EQUAL 6GM TOTAL DOSE) - Mg 1.1 - 1.5 mg/dl  2 g Intravenous PRN Efrain Arevalo MD        Or   • Magnesium Sulfate 4 gram infusion- Mg 1.6-1.9 mg/dL  4 g Intravenous PRN Efrain Arevalo MD       • metoprolol succinate XL (TOPROL-XL) 24 hr tablet 25  "mg  25 mg Oral Q24H Altagracia Mantilla MD   25 mg at 01/05/22 0812   • morphine injection 2 mg  2 mg Intravenous Q2H PRN Celso Méndez MD   2 mg at 01/03/22 2225   • ondansetron (ZOFRAN) injection 4 mg  4 mg Intravenous Q6H PRN Celso Méndez MD   4 mg at 01/04/22 0929   • pantoprazole (PROTONIX) injection 40 mg  40 mg Intravenous Q AM Celso Méndez MD   40 mg at 01/05/22 0546   • potassium chloride (KLOR-CON) packet 40 mEq  40 mEq Oral PRN Efrain Arevalo MD       • potassium chloride (MICRO-K) CR capsule 40 mEq  40 mEq Oral PRN Efrain Arevalo MD   40 mEq at 01/03/22 2046   • prochlorperazine (COMPAZINE) injection 10 mg  10 mg Intravenous Q4H PRN Arik Haines MD   10 mg at 01/03/22 2202    Or   • prochlorperazine (COMPAZINE) tablet 5 mg  5 mg Oral Q6H PRN Arik Haines MD        Or   • prochlorperazine (COMPAZINE) suppository 25 mg  25 mg Rectal Q12H PRN Arik Haines MD       • sodium chloride 0.9 % flush 10 mL  10 mL Intravenous PRN Celso Méndez MD       • sodium chloride 0.9 % flush 10 mL  10 mL Intravenous Q12H Celso Méndez MD   10 mL at 01/05/22 0812   • sodium chloride 0.9 % flush 10 mL  10 mL Intravenous PRN Celso Méndez MD   10 mL at 01/03/22 1805   • venlafaxine (EFFEXOR) tablet 75 mg  75 mg Oral BID Celso Méndez MD   75 mg at 01/05/22 0812       Vital Sign Min/Max for last 24 hours  Temp  Min: 97.4 °F (36.3 °C)  Max: 97.6 °F (36.4 °C)   BP  Min: 90/60  Max: 143/77   Pulse  Min: 64  Max: 74   Resp  Min: 14  Max: 21   SpO2  Min: 92 %  Max: 98 %   Flow (L/min)  Min: 2  Max: 2   Weight  Min: 88.8 kg (195 lb 12.3 oz)  Max: 88.8 kg (195 lb 12.3 oz)     Flowsheet Rows      First Filed Value   Admission Height 190.5 cm (75\") Documented at 12/31/2021 1602   Admission Weight 87.1 kg (192 lb 0.3 oz) Documented at 12/31/2021 1559            Intake/Output Summary (Last 24 hours) at 1/5/2022 1249  Last data filed at 1/5/2022 0815  Gross per 24 hour   Intake 801 ml   Output 1300 ml   Net " -499 ml       Physical Exam:  Neck: Supple.  Positive mild JVD  Chest: Creased bilateral air entry with mild rales  Cardiovascular system:  Regular rate and rhythm, no murmurs.  Abdomen: Soft, no tenderness, bowel sounds present, no hepatosplenomegaly.  CNS: Alert, oriented to place and time.  No motor or sensory deficit.  Cranial nerves intact.  Musculoskeletal: No deformity of the back or spine.  Extremities:  No edema.  Pulses equal on both sides.     Results Review:   I reviewed the patient's new clinical results.  Lab Results   Component Value Date    WBC 8.41 01/05/2022    HGB 13.0 01/05/2022    HCT 38.2 01/05/2022    MCV 92.9 01/05/2022     (L) 01/05/2022     Lab Results   Component Value Date    GLUCOSE 143 (H) 01/05/2022    BUN 38 (H) 01/05/2022    CREATININE 1.74 (H) 01/05/2022    EGFRIFNONA 40 (L) 01/05/2022    BCR 21.8 01/05/2022    CO2 25.0 01/05/2022    CALCIUM 8.8 01/05/2022    ALBUMIN 3.90 01/05/2022     (H) 01/05/2022     (H) 01/05/2022     No results found for: TSH  Lab Results   Component Value Date    INR 1.79 (H) 01/03/2022    PROTIME 20.4 (H) 01/03/2022     No results found for: PTT    EKG:     Telemetry:    Ejection Fraction  No results found for: EF    Echo EF Estimated  Lab Results   Component Value Date    ECHOEFEST 22 01/03/2022         Present on Admission:  • Paroxysmal atrial fibrillation with rapid ventricular response (HCC)  • Acute on chronic systolic CHF (congestive heart failure) ([facility])  • Moderate malnutrition (CMS/HCC)  • Hypotension    Assessment/Plan   #1 atrial fibrillation with rapid ventricular      Status post electrical cardioversion maintained sinus rhythm with a continue oral amiodarone continue oral anticoagulation     #2 congestive heart failure possible acute on chronic     Should have marginal hemodynamic increasing creatinine renal output is appreciated.  Will hold diuretics ACE/ARB as per renal recommendation recommend to continue albumin on  "next 2 to 3 days       Restrict 1000 fluid     Sodium 2 g to  Patient was counseled educated avoid nonsteroid and drink        #3 moderate mitral tricuspid regurgitations will reassess after electrical cardioversions as an outpatient    Altagracia Mantilla MD  22  12:49 CST      Part of this note may be an electronic transcription/translation of spoken language to printed text using the Dragon Dictation system.        Electronically signed by Altagracia Mantilla MD at 22 1115     Becka Aparicio MD at 22 1028          [facility]  [address] [place] [address]. [address]  T - [phone]  F - [phone]     Progress Note          PATIENT  DEMOGRAPHICS   PATIENT NAME: Kd Lingurmeet Nicholsonry                      PHYSICIAN: Becka Aparicio MD  : 1960  MRN: [id number]   LOS: 5 days    Patient Care Team:  Estella Gutierrez MD as PCP - General (Family Medicine)  Subjective   SUBJECTIVE   Comfortable. No orthopnea         Objective   OBJECTIVE   Vital Signs  Temp:  [97.4 °F (36.3 °C)-97.6 °F (36.4 °C)] 97.5 °F (36.4 °C)  Heart Rate:  [64-74] 69  Resp:  [14-21] 17  BP: ()/(60-85) 123/71    Flowsheet Rows      First Filed Value   Admission Height 190.5 cm (75\") Documented at 2021 1602   Admission Weight 87.1 kg (192 lb 0.3 oz) Documented at 2021 1559           I/O last 3 completed shifts:  In: 1556 [P.O.:420; I.V.:936; IV Piggyback:200]  Out: 1225 [Urine:1225]    PHYSICAL EXAM    Physical Exam  Constitutional:       Appearance: He is well-developed.   HENT:      Head: Normocephalic.   Eyes:      Pupils: Pupils are equal, round, and reactive to light.   Cardiovascular:      Rate and Rhythm: Normal rate and regular rhythm.      Heart sounds: Normal heart sounds.   Pulmonary:      Effort: Pulmonary effort is normal.      Breath sounds: Normal breath sounds.   Abdominal:      General: Bowel sounds are normal.      Palpations: Abdomen is soft.   Musculoskeletal:         " General: No swelling.   Skin:     Coloration: Skin is not jaundiced.   Neurological:      General: No focal deficit present.      Mental Status: He is alert and oriented to person, place, and time.         RESULTS   Results Review:    Results from last 7 days   Lab Units 01/04/22  0725 01/03/22  0612 01/02/22  1348 01/02/22  0313 12/31/21  0849 12/30/21  1100 12/30/21  1100   SODIUM mmol/L 135* 140 139   < > 139   < > 135*   POTASSIUM mmol/L 4.8 3.5 3.7   < > 3.8   < > 3.8   CHLORIDE mmol/L 98 101 101   < > 103   < > 101   CO2 mmol/L 24.0 26.0 22.0   < > 24.0   < > 20.0*   BUN mg/dL 36* 30* 29*   < > 22   < > 26*   CREATININE mg/dL 2.61* 1.60* 1.84*   < > 1.12   < > 1.01   CALCIUM mg/dL 9.3 8.9 9.1   < > 9.1   < > 9.1   BILIRUBIN mg/dL 1.3*  --   --   --  1.3*  --  0.9   ALK PHOS U/L 66  --   --   --  87  --  85   ALT (SGPT) U/L 437*  --   --   --  16  --  17   AST (SGOT) U/L 842*  --   --   --  23  --  22   GLUCOSE mg/dL 154* 103* 202*   < > 142*   < > 151*    < > = values in this interval not displayed.       Estimated Creatinine Clearance: 36.4 mL/min (A) (by C-G formula based on SCr of 2.61 mg/dL (H)).    Results from last 7 days   Lab Units 01/02/22  0313   MAGNESIUM mg/dL 1.7             Results from last 7 days   Lab Units 01/05/22  0956 01/04/22  0726 01/02/22  1348 12/31/21  0849 12/30/21  1101   WBC 10*3/mm3 8.41 12.55* 10.04 7.43 7.38   HEMOGLOBIN g/dL 13.0 14.1 15.1 15.1 15.3   PLATELETS 10*3/mm3 139* 177 206 191 192       Results from last 7 days   Lab Units 01/03/22  0612   INR  1.79*         Imaging Results (Last 24 Hours)     Procedure Component Value Units Date/Time    US Renal Bilateral [[id number]] Collected: 01/04/22 1600     Updated: 01/04/22 1651    Narrative:      Ultrasound renal complete    HISTORY:  Worsening renal function. Edema.    Ultrasound examination of the kidneys and urinary bladder was  performed.    COMPARISON: None.     FINDINGS:  The right kidney measures 11.2 cm in length by  5.10 cm x 4.78 cm  transverse.  The left kidney measures 10.4 cm in length by 5.00 cm x 4.34 cm  transverse.  The kidneys are of normal echotexture.  No hydronephrosis.  No solid or cystic renal mass.    Images of the urinary bladder are unremarkable.    Minimal ascites.  Left pleural effusion.      Impression:      CONCLUSION:  Normal kidneys.  Minimal ascites.  Left pleural effusion.    [id number]    Electronically signed by:  Lester Blackburn MD  1/4/2022 4:50 PM [employer]  Workstation: [employer]           MEDICATIONS    albumin human, 25 g, Intravenous, BID  amiodarone, 200 mg, Oral, BID With Meals  apixaban, 5 mg, Oral, Q12H  lactated ringers, 250 mL, Intravenous, Once  losartan, 25 mg, Oral, Q24H  metoprolol succinate XL, 25 mg, Oral, Q24H  pantoprazole, 40 mg, Intravenous, Q AM  sodium chloride, 10 mL, Intravenous, Q12H  spironolactone, 12.5 mg, Oral, Daily  tamsulosin, 0.4 mg, Oral, Nightly  venlafaxine, 75 mg, Oral, BID      DOBUTamine, 2.5 mcg/kg/min, Last Rate: 2.5 mcg/kg/min (01/05/22 1004)        Assessment/Plan   ASSESSMENT / PLAN      Paroxysmal atrial fibrillation with rapid ventricular response (HCC)    Acute on chronic systolic CHF (congestive heart failure) ([facility])    Moderate malnutrition (CMS/HCC)    Hypotension       1.  ROSALVA- Baseline creatinine around 1.1, creatinine now up to 2.6.  Etiology is likely multifactorial secondary to dye exposure from CTA, diuretics, as well as intermittent hypotension.  At present we will hold his diuretics, continue albumin 3 times daily.  Hold lisinopril, Flomax, spironolactone (due to low bp , rosalva).   u/s renal no hydro . fena is consistent with pre renal pictures. He has elevated jvd and will need diuretic soon . Check lab today    2.  A. fib with RVR- cardiology following, improved following cardioversion on 1/3/2022. Has prior ablation procedure     3.  Acute on chronic CHF- cardiology following, improved, continue dobutamine     4.  Intermittent hypotension- med  changes as above                   This document has been electronically signed by Becka Aparicio MD on January 5, 2022 10:28 CST           Electronically signed by Becka Aparicio MD at 01/05/22 1036     Efrain Arevalo MD at 01/04/22 1235              [facility] Medicine Services  INPATIENT PROGRESS NOTE    Length of Stay: 4  Date of Admission: 12/31/2021  Primary Care Physician: Estella Gutierrez MD    Subjective   Chief Complaint: Palpitations/dyspnea  HPI: Doing well currently but has had difficulties with SOA requiring further diuresis.  Patient denies any current concerns. BP has been intermittently low.     Review of Systems   Constitutional: Negative for chills and fever.   HENT: Negative for congestion.    Respiratory: Positive for shortness of breath. Negative for cough and wheezing.    Cardiovascular: Negative for chest pain and palpitations.   Gastrointestinal: Negative for abdominal pain, constipation, diarrhea and nausea.   Genitourinary: Negative.    Musculoskeletal: Negative.    Neurological: Negative.    Psychiatric/Behavioral: Negative.    All other systems reviewed and are negative.     All pertinent negatives and positives are as above. All other systems have been reviewed and are negative unless otherwise stated.     Objective    Temp:  [96.7 °F (35.9 °C)-97.8 °F (36.6 °C)] 96.7 °F (35.9 °C)  Heart Rate:  [] 68  Resp:  [15-22] 17  BP: ()/(54-84) 102/70    Physical Exam  Constitutional:       General: He is not in acute distress.     Appearance: He is not toxic-appearing.   HENT:      Head: Normocephalic and atraumatic.      Right Ear: External ear normal.      Left Ear: External ear normal.      Nose: Nose normal.      Mouth/Throat:      Mouth: Mucous membranes are moist.      Pharynx: Oropharynx is clear.   Eyes:      Conjunctiva/sclera: Conjunctivae normal.   Cardiovascular:      Rate and Rhythm: Normal rate and regular rhythm.      Pulses:  Normal pulses.      Heart sounds: Normal heart sounds.   Pulmonary:      Effort: Pulmonary effort is normal. No respiratory distress.      Breath sounds: Normal breath sounds.   Abdominal:      General: Bowel sounds are normal.      Palpations: Abdomen is soft.      Tenderness: There is no abdominal tenderness.   Musculoskeletal:         General: No swelling.   Skin:     General: Skin is warm and dry.      Capillary Refill: Capillary refill takes less than 2 seconds.   Neurological:      General: No focal deficit present.      Mental Status: He is alert and oriented to person, place, and time. Mental status is at baseline.   Psychiatric:         Behavior: Behavior normal.         Thought Content: Thought content normal.         Results Review:  I have reviewed the labs, radiology results, and diagnostic studies.    Laboratory Data:   Results from last 7 days   Lab Units 01/04/22  0725 01/03/22  0612 01/02/22  1348 01/02/22  0313 12/31/21  0849 12/30/21  1100 12/30/21  1100   SODIUM mmol/L 135* 140 139   < > 139   < > 135*   POTASSIUM mmol/L 4.8 3.5 3.7   < > 3.8   < > 3.8   CHLORIDE mmol/L 98 101 101   < > 103   < > 101   CO2 mmol/L 24.0 26.0 22.0   < > 24.0   < > 20.0*   BUN mg/dL 36* 30* 29*   < > 22   < > 26*   CREATININE mg/dL 2.61* 1.60* 1.84*   < > 1.12   < > 1.01   GLUCOSE mg/dL 154* 103* 202*   < > 142*   < > 151*   CALCIUM mg/dL 9.3 8.9 9.1   < > 9.1   < > 9.1   BILIRUBIN mg/dL 1.3*  --   --   --  1.3*  --  0.9   ALK PHOS U/L 66  --   --   --  87  --  85   ALT (SGPT) U/L 437*  --   --   --  16  --  17   AST (SGOT) U/L 842*  --   --   --  23  --  22   ANION GAP mmol/L 13.0 13.0 16.0*   < > 12.0   < > 14.0    < > = values in this interval not displayed.     Estimated Creatinine Clearance: 39.7 mL/min (A) (by C-G formula based on SCr of 2.61 mg/dL (H)).  Results from last 7 days   Lab Units 01/02/22  0313   MAGNESIUM mg/dL 1.7         Results from last 7 days   Lab Units 01/04/22  0726 01/02/22  1348  12/31/21  0849 12/30/21  1101   WBC 10*3/mm3 12.55* 10.04 7.43 7.38   HEMOGLOBIN g/dL 14.1 15.1 15.1 15.3   HEMATOCRIT % 42.1 47.8 44.9 45.6   PLATELETS 10*3/mm3 177 206 191 192     Results from last 7 days   Lab Units 01/03/22  0612   INR  1.79*       Culture Data:   No results found for: BLOODCX  No results found for: URINECX  No results found for: RESPCX  No results found for: WOUNDCX  No results found for: STOOLCX  No components found for: BODYFLD    Radiology Data:   Imaging Results (Last 24 Hours)     ** No results found for the last 24 hours. **          I have reviewed the patient's current medications.     Assessment/Plan     Principal Problem:    Paroxysmal atrial fibrillation with rapid ventricular response (HCC)  Active Problems:    Acute on chronic systolic CHF (congestive heart failure) ([facility])    Moderate malnutrition (CMS/HCC)    Hypotension    Plan:  -Appreciate cardiology's assistance, s/p cardioversion on 1/3/2022.   -Continue with amiodarone   -Continue fluid restriction  -Discussed with Cardiology, started on Dobutamine to help with BP and diuresis.    -Xanax available as well for anxiety as this is likely contributing to his shortness of breath.   -Renal function appears to be gradually worsening, likely related to intermittent low BP and possible diuresis. Will check renal US and UA.  Nephrology consult also placed.    -DVT prophylaxis with Eliquis  -CODE STATUS: Full    I confirmed that the patient's Advance Care Plan is present, code status is documented, or surrogate decision maker is listed in the patient's medical record.     Discharge Planning: In process, continue CCU care for now.     Efrain Arevalo MD      Electronically signed by Efrain Arevalo MD at 01/04/22 1239     Altagracia Mantilla MD at 01/04/22 1049           LOS: 4 days   Patient Care Team:  Estella Gutierrez MD as PCP - General (Family Medicine)    Chief Complaint:  atrial fibrillation with rapid ventricular rate and congestive  heart failure due to reduced left ventricular systolic function  Patient is s/p electrical cardioversion maintained sinus rhythm.  Will discontinue IV amiodarone.  Blood pressures are marginal.  Patient clinically heart failure.  We will start the patient dobutamine received the diuretics.  IV albumin can be utilized and if the blood pressures more than 140 mg Lasix can be content contemplated discussed with the nurse  61 years old patient with background history of paroxysmal atrial fibrillation s/p EP study and A. fib ablation in the [place] area more than 3 years ago and a history of congestive heart failure have some weak muscle was on Lasix and blood pressure medicine discontinued.  The patient presented for evaluation increasing shortness of breath functional class III no limited orthopnea or PND noted in atrial fibrillation with rapid ventricular rate and clinically patient was in congestive heart failure with elevated BNP mild lower extremity edema and chest x-ray bilateral pleural effusion no evidence of pulmonary embolism on CT pulmonary angiogram.  Troponin is normal.  Patient received 80 mg Lasix yesterday with a significant provement in shortness of breath and good diuresis.  He has marginal hemodynamic and we are unable to uptitrate beta-blocker and to start Aldactone or ACE/ARB at this stage.      Review of Systems:     Constitution:  Denies any fatigue, fever or chills.  Positive for activity change positive for fatigue    HENT:  Denies any headache, hearing impairment.    Eyes:  Denies any blurring of vision     Cardiovascular:  As per history of present illness.     Respiratory: Exertional dyspnea functional class III       Gastrointestinal:  No nausea, vomiting, or melena.    Extremity: No edema, positive pulses    Objective     Current Facility-Administered Medications   Medication Dose Route Frequency Provider Last Rate Last Admin   • ALPRAZolam (XANAX) tablet 0.5 mg  0.5 mg Oral TID PRN  Arie Lockhart MD   0.5 mg at 01/03/22 2335   • amiodarone 360 mg in 200 mL D5W infusion  0.5 mg/min Intravenous Continuous Altagracia Mantilla MD 16.67 mL/hr at 01/04/22 0205 0.5 mg/min at 01/04/22 0205   • apixaban (ELIQUIS) tablet 5 mg  5 mg Oral Q12H Celso Méndez MD   5 mg at 01/04/22 0821   • DOBUTamine (DOBUTREX) 1 mg/mL infusion  5 mcg/kg/min Intravenous Titrated Altagracia Mantilla MD 29.1 mL/hr at 01/04/22 0930 5 mcg/kg/min at 01/04/22 0930   • furosemide (LASIX) injection 40 mg  40 mg Intravenous Once Altagracia Mantilla MD       • lactated ringers bolus 250 mL  250 mL Intravenous Once Arie Lockhart MD   Held at 01/04/22 0739   • losartan (COZAAR) tablet 25 mg  25 mg Oral Q24H Altagracia Mantilla MD   25 mg at 01/03/22 1336   • Magnesium Sulfate 2 gram Bolus, followed by 8 gram infusion (total Mg dose 10 grams)- Mg less than or equal to 1mg/dL  2 g Intravenous PRN Efrain Arevalo MD        Or   • Magnesium Sulfate 2 gram / 50mL Infusion (GIVE X 3 BAGS TO EQUAL 6GM TOTAL DOSE) - Mg 1.1 - 1.5 mg/dl  2 g Intravenous PRN Efrain Arevalo MD        Or   • Magnesium Sulfate 4 gram infusion- Mg 1.6-1.9 mg/dL  4 g Intravenous PRN Efrain Arevalo MD       • metoprolol succinate XL (TOPROL-XL) 24 hr tablet 25 mg  25 mg Oral Q24H Altagracia Mantilla MD   25 mg at 01/04/22 0821   • morphine injection 2 mg  2 mg Intravenous Q2H PRN Celso Méndez MD   2 mg at 01/03/22 2225   • ondansetron (ZOFRAN) injection 4 mg  4 mg Intravenous Q6H PRN Celso Méndez MD   4 mg at 01/04/22 0929   • pantoprazole (PROTONIX) injection 40 mg  40 mg Intravenous Q AM Celso Méndez MD   40 mg at 01/04/22 0509   • potassium chloride (KLOR-CON) packet 40 mEq  40 mEq Oral PRN Efrain Arevalo MD       • potassium chloride (MICRO-K) CR capsule 40 mEq  40 mEq Oral PRN Efrain Arevalo MD   40 mEq at 01/03/22 2046   • prochlorperazine (COMPAZINE) injection 10 mg  10 mg Intravenous Q4H PRN Arik Haines MD   10 mg at 01/03/22 2202    Or  "  • prochlorperazine (COMPAZINE) tablet 5 mg  5 mg Oral Q6H PRN Arik Haines MD        Or   • prochlorperazine (COMPAZINE) suppository 25 mg  25 mg Rectal Q12H PRN Arik Haines MD       • sodium chloride 0.9 % flush 10 mL  10 mL Intravenous PRN Celso Méndez MD       • sodium chloride 0.9 % flush 10 mL  10 mL Intravenous Q12H Celso Méndez MD   10 mL at 01/04/22 0821   • sodium chloride 0.9 % flush 10 mL  10 mL Intravenous PRN Celso Méndez MD   10 mL at 01/03/22 1805   • spironolactone (ALDACTONE) half tablet 12.5 mg  12.5 mg Oral Daily Altagracia Mantilla MD   12.5 mg at 01/04/22 0821   • tamsulosin (FLOMAX) 24 hr capsule 0.4 mg  0.4 mg Oral Nightly Celso Méndez MD   0.4 mg at 01/03/22 2046   • venlafaxine (EFFEXOR) tablet 75 mg  75 mg Oral BID Celso Méndez MD   75 mg at 01/04/22 0821       Vital Sign Min/Max for last 24 hours  Temp  Min: 96.7 °F (35.9 °C)  Max: 97.8 °F (36.6 °C)   BP  Min: 83/54  Max: 136/79   Pulse  Min: 67  Max: 106   Resp  Min: 15  Max: 22   SpO2  Min: 93 %  Max: 100 %   Flow (L/min)  Min: 2  Max: 3   Weight  Min: 96.9 kg (213 lb 10 oz)  Max: 96.9 kg (213 lb 10 oz)     Flowsheet Rows      First Filed Value   Admission Height 190.5 cm (75\") Documented at 12/31/2021 1602   Admission Weight 87.1 kg (192 lb 0.3 oz) Documented at 12/31/2021 1559            Intake/Output Summary (Last 24 hours) at 1/4/2022 1049  Last data filed at 1/4/2022 0400  Gross per 24 hour   Intake 1095 ml   Output 450 ml   Net 645 ml       Physical Exam:  Neck: Supple.  Positive mild JVD  Chest: Creased bilateral air entry with mild rales  Cardiovascular system:  Regular rate and rhythm, no murmurs.  Abdomen: Soft, no tenderness, bowel sounds present, no hepatosplenomegaly.  CNS: Alert, oriented to place and time.  No motor or sensory deficit.  Cranial nerves intact.  Musculoskeletal: No deformity of the back or spine.  Extremities:  No edema.  Pulses equal on both sides.     Results Review:   I reviewed " the patient's new clinical results.  Lab Results   Component Value Date    WBC 12.55 (H) 01/04/2022    HGB 14.1 01/04/2022    HCT 42.1 01/04/2022    MCV 93.8 01/04/2022     01/04/2022     Lab Results   Component Value Date    GLUCOSE 154 (H) 01/04/2022    BUN 36 (H) 01/04/2022    CREATININE 2.61 (H) 01/04/2022    EGFRIFNONA 25 (L) 01/04/2022    BCR 13.8 01/04/2022    CO2 24.0 01/04/2022    CALCIUM 9.3 01/04/2022    ALBUMIN 3.80 01/04/2022     (H) 01/04/2022     (H) 01/04/2022     No results found for: TSH  Lab Results   Component Value Date    INR 1.79 (H) 01/03/2022    PROTIME 20.4 (H) 01/03/2022     No results found for: PTT    EKG:     Telemetry:    Ejection Fraction  No results found for: EF    Echo EF Estimated  Lab Results   Component Value Date    ECHOEFEST 22 01/03/2022         Present on Admission:  • Paroxysmal atrial fibrillation with rapid ventricular response (HCC)  • Acute on chronic systolic CHF (congestive heart failure) (HCC)  • Moderate malnutrition (CMS/HCC)  • Hypotension    Assessment/Plan   #1 atrial fibrillation with rapid ventricular      Continue oral amiodarone IV amiodarone was discontinued patient remained in sinus rhythm after electrical cardio     #2 congestive heart failure possible acute on chronic     Marginal hemodynamic clinically is in heart failure.  We will start the patient dobutamine he received low-dose diuretics.  Based on the blood pressure and if needed albumin can be contemplated.  We will give 40 mg Lasix for the blood pressures is more than 100 systolic.  Started on very low-dose of beta-blocker losartan and Aldactone.  Will get congestive heart failure consultation       Restrict 1000 fluid     Sodium 2 g to  Patient was counseled educated avoid nonsteroid and drink        #3 moderate mitral tricuspid regurgitations will reassess after electrical cardioversions as an outpatient    Altagracia Mantilla MD  01/04/22  10:49 CST      Part of this note may  be an electronic transcription/translation of spoken language to printed text using the Dragon Dictation system.        Electronically signed by Altagracia Mantilla MD at 01/04/22 1056     Efrain Arevalo MD at 01/03/22 1035              [facility]  INPATIENT PROGRESS NOTE    Length of Stay: 3  Date of Admission: 12/31/2021  Primary Care Physician: Estella Gutierrez MD    Subjective   Chief Complaint: Palpitations/dyspnea  HPI: Patient seen and examined, doing well status post cardioversion.  Denies any palpitations or chest pain.  Reports some dyspnea but otherwise feels overall improved.    Review of Systems   Constitutional: Negative for chills and fever.   HENT: Negative for congestion.    Respiratory: Positive for shortness of breath. Negative for cough and wheezing.    Cardiovascular: Negative for chest pain and palpitations.   Gastrointestinal: Negative for abdominal pain, constipation, diarrhea and nausea.   Genitourinary: Negative.    Musculoskeletal: Negative.    Neurological: Negative.    Psychiatric/Behavioral: Negative.    All other systems reviewed and are negative.     All pertinent negatives and positives are as above. All other systems have been reviewed and are negative unless otherwise stated.     Objective    Temp:  [96.9 °F (36.1 °C)-98 °F (36.7 °C)] 97.9 °F (36.6 °C)  Heart Rate:  [114-122] 118  Resp:  [19] 19  BP: ()/(53-87) 116/78    Physical Exam  Constitutional:       General: He is not in acute distress.     Appearance: He is not toxic-appearing.   HENT:      Head: Normocephalic and atraumatic.      Right Ear: External ear normal.      Left Ear: External ear normal.      Nose: Nose normal.      Mouth/Throat:      Mouth: Mucous membranes are moist.      Pharynx: Oropharynx is clear.   Eyes:      Conjunctiva/sclera: Conjunctivae normal.   Cardiovascular:      Rate and Rhythm: Normal rate and regular rhythm.      Pulses: Normal pulses.       Heart sounds: Normal heart sounds.   Pulmonary:      Effort: Pulmonary effort is normal. No respiratory distress.      Breath sounds: Normal breath sounds.   Abdominal:      General: Bowel sounds are normal.      Palpations: Abdomen is soft.      Tenderness: There is no abdominal tenderness.   Musculoskeletal:         General: No swelling.   Skin:     General: Skin is warm and dry.      Capillary Refill: Capillary refill takes less than 2 seconds.   Neurological:      General: No focal deficit present.      Mental Status: He is alert and oriented to person, place, and time. Mental status is at baseline.   Psychiatric:         Behavior: Behavior normal.         Thought Content: Thought content normal.         Results Review:  I have reviewed the labs, radiology results, and diagnostic studies.    Laboratory Data:   Results from last 7 days   Lab Units 01/03/22  0612 01/02/22  1348 01/02/22  0313 12/31/21  0849 12/31/21  0849 12/30/21  1100 12/30/21  1100   SODIUM mmol/L 140 139 139   < > 139   < > 135*   POTASSIUM mmol/L 3.5 3.7 3.9   < > 3.8   < > 3.8   CHLORIDE mmol/L 101 101 104   < > 103   < > 101   CO2 mmol/L 26.0 22.0 26.0   < > 24.0   < > 20.0*   BUN mg/dL 30* 29* 28*   < > 22   < > 26*   CREATININE mg/dL 1.60* 1.84* 1.55*   < > 1.12   < > 1.01   GLUCOSE mg/dL 103* 202* 137*   < > 142*   < > 151*   CALCIUM mg/dL 8.9 9.1 9.0   < > 9.1   < > 9.1   BILIRUBIN mg/dL  --   --   --   --  1.3*  --  0.9   ALK PHOS U/L  --   --   --   --  87  --  85   ALT (SGPT) U/L  --   --   --   --  16  --  17   AST (SGOT) U/L  --   --   --   --  23  --  22   ANION GAP mmol/L 13.0 16.0* 9.0   < > 12.0   < > 14.0    < > = values in this interval not displayed.     Estimated Creatinine Clearance: 65.4 mL/min (A) (by C-G formula based on SCr of 1.6 mg/dL (H)).  Results from last 7 days   Lab Units 01/02/22  0313   MAGNESIUM mg/dL 1.7         Results from last 7 days   Lab Units 01/02/22  1348 12/31/21  0849 12/30/21  1101   WBC  10*3/mm3 10.04 7.43 7.38   HEMOGLOBIN g/dL 15.1 15.1 15.3   HEMATOCRIT % 47.8 44.9 45.6   PLATELETS 10*3/mm3 206 191 192     Results from last 7 days   Lab Units 01/03/22  0612   INR  1.79*       Culture Data:   No results found for: BLOODCX  No results found for: URINECX  No results found for: RESPCX  No results found for: WOUNDCX  No results found for: STOOLCX  No components found for: BODYFLD    Radiology Data:   Imaging Results (Last 24 Hours)     ** No results found for the last 24 hours. **          I have reviewed the patient's current medications.     Assessment/Plan     Principal Problem:    Paroxysmal atrial fibrillation with rapid ventricular response (HCC)  Active Problems:    Acute on chronic systolic CHF (congestive heart failure) (HCC)    Moderate malnutrition (CMS/HCC)    Hypotension    Plan:  -Appreciate cardiology's assistance, doing well status post cardioversion earlier this morning  -Continue with amiodarone with plans to switch back to p.o.  -Continue fluid restriction  -Blood pressures currently improved today, continue to monitor  -Discussed with cardiology, continue CCU care, will likely need heart cath at some point.  -DVT prophylaxis with Eliquis  -CODE STATUS: Full    I confirmed that the patient's Advance Care Plan is present, code status is documented, or surrogate decision maker is listed in the patient's medical record.     Discharge Planning: In process    Efrain Arevalo MD      Electronically signed by Efrain Arevalo MD at 01/03/22 1039     Altagracia Mantilla MD at 01/02/22 1306           LOS: 2 days   Patient Care Team:  Estella Gutierrez MD as PCP - General (Family Medicine)    Chief Complaint:  atrial fibrillation with rapid ventricular rate and congestive heart failure due to reduced left ventricle systolic function  61 years old patient with background history of paroxysmal atrial fibrillation s/p EP study and A. fib ablation in the [place] area more than 3 years ago and a  history of congestive heart failure have some weak muscle was on Lasix and blood pressure medicine discontinued.  The patient presented for evaluation increasing shortness of breath functional class III no limited orthopnea or PND noted in atrial fibrillation with rapid ventricular rate and clinically patient was in congestive heart failure with elevated BNP mild lower extremity edema and chest x-ray bilateral pleural effusion no evidence of pulmonary embolism on CT pulmonary angiogram.  Troponin is normal.  Patient received 80 mg Lasix yesterday with a significant provement in shortness of breath and good diuresis.  He has marginal hemodynamic and we are unable to uptitrate beta-blocker and to start Aldactone or ACE/ARB at this stage.      Review of Systems:     Constitution:  Denies any fatigue, fever or chills.  Positive for activity change positive for fatigue    HENT:  Denies any headache, hearing impairment.    Eyes:  Denies any blurring of vision     Cardiovascular:  As per history of present illness.     Respiratory: Exertional dyspnea functional class III       Gastrointestinal:  No nausea, vomiting, or melena.    Extremity: No edema, positive pulses    Objective     Current Facility-Administered Medications   Medication Dose Route Frequency Provider Last Rate Last Admin   • amiodarone (PACERONE) tablet 200 mg  200 mg Oral Q12H Celso Méndez MD   200 mg at 01/01/22 2151   • amiodarone 360 mg in 200 mL D5W infusion  0.5 mg/min Intravenous Continuous Altagracia Mantilla MD 16.67 mL/hr at 01/01/22 2349 0.5 mg/min at 01/01/22 2349   • apixaban (ELIQUIS) tablet 5 mg  5 mg Oral Q12H Celso Méndez MD   5 mg at 01/02/22 1104   • furosemide (LASIX) injection 40 mg  40 mg Intravenous Q12H Celso Méndez MD   40 mg at 01/02/22 0603   • metoprolol tartrate (LOPRESSOR) half tablet 12.5 mg  12.5 mg Oral Q12H Celso Méndez MD   12.5 mg at 01/02/22 1104   • morphine injection 2 mg  2 mg Intravenous Q2H PRN Dev  "MD Celso   2 mg at 01/01/22 2110   • ondansetron (ZOFRAN) injection 4 mg  4 mg Intravenous Q6H PRN Celso Méndez MD   4 mg at 01/01/22 2110   • pantoprazole (PROTONIX) injection 40 mg  40 mg Intravenous Q AM Celso Méndez MD   40 mg at 01/02/22 0604   • prochlorperazine (COMPAZINE) injection 10 mg  10 mg Intravenous Q4H PRN Arik Haines MD   10 mg at 01/02/22 1303    Or   • prochlorperazine (COMPAZINE) tablet 5 mg  5 mg Oral Q6H PRN Arik Haines MD        Or   • prochlorperazine (COMPAZINE) suppository 25 mg  25 mg Rectal Q12H PRN Arik Haines MD       • sodium chloride 0.9 % flush 10 mL  10 mL Intravenous PRN Celso Méndez MD       • sodium chloride 0.9 % flush 10 mL  10 mL Intravenous Q12H Celso Méndez MD   10 mL at 01/02/22 0844   • sodium chloride 0.9 % flush 10 mL  10 mL Intravenous PRN Celso Méndez MD   10 mL at 01/02/22 1303   • tamsulosin (FLOMAX) 24 hr capsule 0.4 mg  0.4 mg Oral Nightly Celso Méndez MD   0.4 mg at 12/31/21 2003   • venlafaxine (EFFEXOR) tablet 75 mg  75 mg Oral BID Celso Méndez MD   75 mg at 01/02/22 1104       Vital Sign Min/Max for last 24 hours  Temp  Min: 97.5 °F (36.4 °C)  Max: 97.8 °F (36.6 °C)   BP  Min: 89/62  Max: 135/69   Pulse  Min: 116  Max: 124   Resp  Min: 23  Max: 23   SpO2  Min: 91 %  Max: 99 %   Flow (L/min)  Min: 2  Max: 2   Weight  Min: 99.3 kg (218 lb 14.7 oz)  Max: 99.3 kg (218 lb 14.7 oz)     Flowsheet Rows      First Filed Value   Admission Height 190.5 cm (75\") Documented at 12/31/2021 1602   Admission Weight 87.1 kg (192 lb 0.3 oz) Documented at 12/31/2021 1559            Intake/Output Summary (Last 24 hours) at 1/2/2022 1307  Last data filed at 1/2/2022 0839  Gross per 24 hour   Intake 1000.71 ml   Output 875 ml   Net 125.71 ml       Physical Exam:  Neck: Supple.  No JVD, no thyroid enlargement.  Chest: Air entry equal, normal respiration.  No rhonchi or creps.  Cardiovascular system:  Regular rate and rhythm, no " murmurs.  Abdomen: Soft, no tenderness, bowel sounds present, no hepatosplenomegaly.  CNS: Alert, oriented to place and time.  No motor or sensory deficit.  Cranial nerves intact.  Musculoskeletal: No deformity of the back or spine.  Extremities:  No edema.  Pulses equal on both sides.     Results Review:   I reviewed the patient's new clinical results.  Lab Results   Component Value Date    WBC 7.43 12/31/2021    HGB 15.1 12/31/2021    HCT 44.9 12/31/2021    MCV 94.9 12/31/2021     12/31/2021     Lab Results   Component Value Date    GLUCOSE 137 (H) 01/02/2022    BUN 28 (H) 01/02/2022    CREATININE 1.55 (H) 01/02/2022    EGFRIFNONA 46 (L) 01/02/2022    BCR 18.1 01/02/2022    CO2 26.0 01/02/2022    CALCIUM 9.0 01/02/2022    ALBUMIN 3.80 12/31/2021    AST 23 12/31/2021    ALT 16 12/31/2021     No results found for: TSH  No results found for: INR, PROTIME  No results found for: PTT    EKG:     Telemetry:    Ejection Fraction  No results found for: EF    Echo EF Estimated  Lab Results   Component Value Date    ECHOEFEST 22 12/31/2021         Present on Admission:  • Paroxysmal atrial fibrillation with rapid ventricular response (HCC)  • Acute on chronic systolic CHF (congestive heart failure) (HCC)  • Moderate malnutrition (CMS/HCC)    Assessment/Plan   #1 atrial fibrillation with rapid ventricular rate of unknown duration     61 years old patient with previous history of paroxysmal atrial fibrillation s/p EP study and A. fib ablation at [place] more than 3 years ago had a history of congestive heart failure and left ventricular dysfunction patient mated atrial fibrillation/flutter rate is better today we will continue amiodarone.  Transesophageal guided electrical cardioversion discussed with the patient.  Procedure risk explained.  Risk included but not limited placing transesophageal echocardiogram probe, arrhythmia stroke.  Understand willing to proceed forward.  We will keep n.p.o. after midnight.   Continue Eliquis     #2 congestive heart failure possible acute on chronic functional class III structural stage C     She denies history of myocardial infarction in the past.  He does have history of congestive heart failure but doesn't know left ventricle systolic function.  Recent echocardiogram revealed severe left and systolic dysfunction dilated left ventricle severely elevated left atrial volume moderate mitral tricuspid regurgitations.  His hemodynamics are marginal we'll continue IV amiodarone and also start amiodarone 200 mg twice a day and start low-dose beta-blocker we'll hold ACE/ARB and Aldactone at this stage given the marginal hemodynamic I will arrange CHF evaluation  Given severe LV dysfunction he does need some ischemic evaluation timing depends on patient clinical condition and hospital course     Restrict 1000 fluid     Sodium 2 g to  Patient was counseled educated avoid nonsteroid and drink        #3 moderate mitral tricuspid regurgitations will reassess after electrical cardioversions as an outpatient    Altagracia Mantilla MD  01/02/22  13:07 CST      Part of this note may be an electronic transcription/translation of spoken language to printed text using the Dragon Dictation system.        Electronically signed by Altagracia Mantilla MD at 01/04/22 1056     Celso Méndez MD at 01/02/22 0952          Progress Note  Celso Méndez MD  Hospitalist    Date of visit: 1/2/2022     LOS: 2 days   Patient Care Team:  Estella Gutierrez MD as PCP - General (Family Medicine)    Chief Complaint: palpitations    Subjective     Interval History:     Patient Complaints: palpitations due to uncontrolled atrial fibrillation - remains in atrial fibrillation    History taken from: patient / nursing    Medication Review:   Current Facility-Administered Medications   Medication Dose Route Frequency Provider Last Rate Last Admin   • albumin human 25 % IV SOLN 25 g  25 g Intravenous Daily Celso Méndez MD       •  amiodarone (PACERONE) tablet 200 mg  200 mg Oral Q12H Celso Méndez MD   200 mg at 01/01/22 2151   • amiodarone 360 mg in 200 mL D5W infusion  0.5 mg/min Intravenous Continuous Altagracia Mantilla MD 16.67 mL/hr at 01/02/22 1310 0.5 mg/min at 01/02/22 1310   • apixaban (ELIQUIS) tablet 5 mg  5 mg Oral Q12H Celso Méndez MD   5 mg at 01/02/22 1104   • DOBUTamine (DOBUTREX) 1 mg/mL infusion  2-20 mcg/kg/min Intravenous Titrated Celso Méndez MD       • morphine injection 2 mg  2 mg Intravenous Q2H PRN Celso Méndez MD   2 mg at 01/01/22 2110   • ondansetron (ZOFRAN) injection 4 mg  4 mg Intravenous Q6H PRN Celso Méndez MD   4 mg at 01/01/22 2110   • pantoprazole (PROTONIX) injection 40 mg  40 mg Intravenous Q AM Celso Méndez MD   40 mg at 01/02/22 0604   • prochlorperazine (COMPAZINE) injection 10 mg  10 mg Intravenous Q4H PRN Arik Haines MD   10 mg at 01/02/22 1303    Or   • prochlorperazine (COMPAZINE) tablet 5 mg  5 mg Oral Q6H PRN Arik Haines MD        Or   • prochlorperazine (COMPAZINE) suppository 25 mg  25 mg Rectal Q12H PRN Arik Haines MD       • sodium chloride 0.9 % bolus 500 mL  500 mL Intravenous Once Celso Méndez MD 1,000 mL/hr at 01/02/22 1320 500 mL at 01/02/22 1320   • sodium chloride 0.9 % flush 10 mL  10 mL Intravenous PRN Celso Méndez MD       • sodium chloride 0.9 % flush 10 mL  10 mL Intravenous Q12H Celso Méndez MD   10 mL at 01/02/22 0844   • sodium chloride 0.9 % flush 10 mL  10 mL Intravenous PRN Celso Méndez MD   10 mL at 01/02/22 1303   • tamsulosin (FLOMAX) 24 hr capsule 0.4 mg  0.4 mg Oral Nightly Celso Méndez MD   0.4 mg at 12/31/21 2003   • venlafaxine (EFFEXOR) tablet 75 mg  75 mg Oral BID Celso Méndez MD   75 mg at 01/02/22 1104       Review of Systems:   Review of Systems   Constitutional: Positive for fatigue. Negative for fever.   Respiratory: Positive for shortness of breath. Negative for cough and wheezing.    Cardiovascular:  Positive for palpitations. Negative for chest pain and leg swelling.   Gastrointestinal: Negative for abdominal distention, anal bleeding, constipation, diarrhea, nausea and vomiting.   Genitourinary: Negative for flank pain, frequency, hematuria, penile pain and urgency.   Musculoskeletal: Positive for arthralgias.   Skin: Positive for pallor. Negative for color change and rash.   Neurological: Positive for weakness. Negative for syncope, speech difficulty and headaches.   Psychiatric/Behavioral: Negative for agitation, behavioral problems and confusion.   All other systems reviewed and are negative.      Objective     Vital Signs  Temp:  [97.5 °F (36.4 °C)-97.8 °F (36.6 °C)] 97.5 °F (36.4 °C)  Heart Rate:  [115-124] 117  Resp:  [23] 23  BP: ()/(53-90) 108/76    Physical Exam:  Physical Exam  Vitals reviewed.   Constitutional:       General: He is not in acute distress.     Appearance: He is ill-appearing.   HENT:      Head: Normocephalic and atraumatic.   Eyes:      General: No scleral icterus.     Extraocular Movements: Extraocular movements intact.      Pupils: Pupils are equal, round, and reactive to light.   Cardiovascular:      Rate and Rhythm: Tachycardia present. Rhythm irregular.   Pulmonary:      Effort: Pulmonary effort is normal. No respiratory distress.      Breath sounds: No stridor. No wheezing or rales.   Chest:      Chest wall: No tenderness.   Abdominal:      General: Bowel sounds are normal. There is no distension.      Palpations: Abdomen is soft. There is no mass.      Tenderness: There is no abdominal tenderness. There is no right CVA tenderness, left CVA tenderness or guarding.   Musculoskeletal:         General: No swelling, tenderness or deformity. Normal range of motion.      Cervical back: Normal range of motion and neck supple. No rigidity or tenderness.      Right lower leg: No edema.      Left lower leg: Edema present.   Skin:     Coloration: Skin is pale. Skin is not jaundiced.       Findings: No bruising or lesion.   Neurological:      General: No focal deficit present.      Mental Status: He is alert and oriented to person, place, and time. Mental status is at baseline.      Cranial Nerves: No cranial nerve deficit.      Sensory: No sensory deficit.      Motor: No weakness.   Psychiatric:         Mood and Affect: Mood normal.         Behavior: Behavior normal.          Results Review:    Lab Results (last 24 hours)     Procedure Component Value Units Date/Time    Extra Tubes [[id number]] Collected: 01/02/22 0334    Specimen: Blood, Venous Line Updated: 01/02/22 0445    Narrative:      The following orders were created for panel order Extra Tubes.  Procedure                               Abnormality         Status                     ---------                               -----------         ------                     Lavender Top[[id number]]                                     Final result                 Please view results for these tests on the individual orders.    Lavender Top [[id number]] Collected: 01/02/22 0334    Specimen: Blood Updated: 01/02/22 0445     Extra Tube hold for add-on     Comment: Auto resulted       Basic Metabolic Panel [[id number]]  (Abnormal) Collected: 01/02/22 0313    Specimen: Blood Updated: 01/02/22 0440     Glucose 137 mg/dL      BUN 28 mg/dL      Creatinine 1.55 mg/dL      Sodium 139 mmol/L      Potassium 3.9 mmol/L      Chloride 104 mmol/L      CO2 26.0 mmol/L      Calcium 9.0 mg/dL      eGFR Non African Amer 46 mL/min/1.73      BUN/Creatinine Ratio 18.1     Anion Gap 9.0 mmol/L     Narrative:      GFR Normal >60  Chronic Kidney Disease <60  Kidney Failure <15      Magnesium [[id number]]  (Normal) Collected: 01/02/22 0313    Specimen: Blood Updated: 01/02/22 0440     Magnesium 1.7 mg/dL           Imaging Results (Last 24 Hours)     ** No results found for the last 24 hours. **          Assessment/Plan       Paroxysmal atrial fibrillation with rapid  ventricular response (HCC)    Acute on chronic systolic CHF (congestive heart failure) (HCC)    Hypotension    Moderate malnutrition (CMS/HCC)    Continue with the IV Lasix, Metoprolol at smaller doses given the borderline blood pressure values, continue with the Amiodarone and Eliquis. Cardiology consult greatly appreciated.    Stop the IV Lasix / oral Lopressor given the degree of hypotension - start IV Dobutamine / IV Albumin.    The recent Echo shows a poor LV EF of 20 to 25%.    I confirmed that the patient's Advance Care Plan is present, code status is documented, or surrogate decision maker is listed in the patient's medical record.      Celso Méndez MD  01/02/22  13:34 CST        Electronically signed by Celso Méndez MD at 01/02/22 1334     Celso Méndez MD at 01/01/22 1120          Progress Note  Celso Méndez MD  Hospitalist    Date of visit: 1/1/2022     LOS: 1 day   Patient Care Team:  Estella Gutierrez MD as PCP - General (Family Medicine)    Chief Complaint: palpitations    Subjective     Interval History:     Patient Complaints: palpitations due to uncontrolled atrial fibrillation.    History taken from: patient / nursing    Medication Review:   Current Facility-Administered Medications   Medication Dose Route Frequency Provider Last Rate Last Admin   • amiodarone (PACERONE) tablet 200 mg  200 mg Oral Q12H Celso Méndez MD   200 mg at 01/01/22 1357   • amiodarone 360 mg in 200 mL D5W infusion  0.5 mg/min Intravenous Continuous Celso Méndez MD 16.67 mL/hr at 01/01/22 1001 0.5 mg/min at 01/01/22 1001   • amiodarone 360 mg in 200 mL D5W infusion  0.5 mg/min Intravenous Continuous Altagracia Mantilla MD 16.67 mL/hr at 01/01/22 1349 0.5 mg/min at 01/01/22 1349   • apixaban (ELIQUIS) tablet 5 mg  5 mg Oral Q12H Celso Méndez MD   5 mg at 01/01/22 0839   • furosemide (LASIX) injection 40 mg  40 mg Intravenous Q12H Celso Méndez MD       • metoprolol tartrate (LOPRESSOR) half tablet 12.5 mg  12.5  mg Oral Q12H Celso Méndez MD   12.5 mg at 01/01/22 0839   • morphine injection 2 mg  2 mg Intravenous Q2H PRN Celso Méndez MD   2 mg at 12/31/21 1659   • ondansetron (ZOFRAN) injection 4 mg  4 mg Intravenous Q6H PRN Celso Méndez MD   4 mg at 01/01/22 1159   • pantoprazole (PROTONIX) injection 40 mg  40 mg Intravenous Q AM Celso Méndez MD   40 mg at 01/01/22 0601   • sodium chloride 0.9 % flush 10 mL  10 mL Intravenous PRN Celso Méndez MD       • sodium chloride 0.9 % flush 10 mL  10 mL Intravenous Q12H Celso Méndez MD   10 mL at 01/01/22 0840   • sodium chloride 0.9 % flush 10 mL  10 mL Intravenous PRN Celso Méndez MD       • tamsulosin (FLOMAX) 24 hr capsule 0.4 mg  0.4 mg Oral Nightly Celso Méndez MD   0.4 mg at 12/31/21 2003   • venlafaxine (EFFEXOR) tablet 75 mg  75 mg Oral BID Celso Méndez MD   75 mg at 01/01/22 0839       Review of Systems:   Review of Systems   Constitutional: Positive for fatigue. Negative for fever.   Respiratory: Positive for shortness of breath. Negative for cough and wheezing.    Cardiovascular: Positive for palpitations and leg swelling. Negative for chest pain.   Gastrointestinal: Negative for abdominal distention, anal bleeding, constipation, diarrhea, nausea and vomiting.   Genitourinary: Negative for flank pain, frequency, hematuria, penile pain and urgency.   Musculoskeletal: Positive for arthralgias.   Skin: Positive for pallor. Negative for color change and rash.   Neurological: Positive for weakness. Negative for syncope, speech difficulty and headaches.   Psychiatric/Behavioral: Negative for agitation, behavioral problems and confusion.   All other systems reviewed and are negative.      Objective     Vital Signs  Temp:  [96.4 °F (35.8 °C)-97.9 °F (36.6 °C)] 96.9 °F (36.1 °C)  Heart Rate:  [119-133] 119  Resp:  [19-32] 19  BP: ()/(51-91) 105/66    Physical Exam:  Physical Exam  Vitals reviewed.   Constitutional:       General: He is not in acute  distress.     Appearance: He is ill-appearing.   HENT:      Head: Normocephalic and atraumatic.   Eyes:      General: No scleral icterus.     Extraocular Movements: Extraocular movements intact.      Pupils: Pupils are equal, round, and reactive to light.   Cardiovascular:      Rate and Rhythm: Tachycardia present. Rhythm irregular.   Pulmonary:      Effort: Pulmonary effort is normal. No respiratory distress.      Breath sounds: No stridor. No wheezing or rales.   Chest:      Chest wall: No tenderness.   Abdominal:      General: Bowel sounds are normal. There is no distension.      Palpations: Abdomen is soft. There is no mass.      Tenderness: There is no abdominal tenderness. There is no right CVA tenderness, left CVA tenderness or guarding.   Musculoskeletal:         General: No swelling, tenderness or deformity. Normal range of motion.      Cervical back: Normal range of motion and neck supple. No rigidity or tenderness.      Right lower leg: No edema.      Left lower leg: Edema present.   Skin:     Coloration: Skin is pale. Skin is not jaundiced.      Findings: No bruising or lesion.   Neurological:      General: No focal deficit present.      Mental Status: He is alert and oriented to person, place, and time. Mental status is at baseline.      Cranial Nerves: No cranial nerve deficit.      Sensory: No sensory deficit.      Motor: No weakness.   Psychiatric:         Mood and Affect: Mood normal.         Behavior: Behavior normal.          Results Review:    Lab Results (last 24 hours)     Procedure Component Value Units Date/Time    Troponin [[id number]]  (Normal) Collected: 12/31/21 1739    Specimen: Blood Updated: 12/31/21 1847     Troponin T <0.010 ng/mL     Narrative:      Troponin T Reference Range:  <= 0.03 ng/mL-   Negative for AMI  >0.03 ng/mL-     Abnormal for myocardial necrosis.  Clinicians would have to utilize clinical acumen, EKG, Troponin and serial changes to determine if it is an Acute  Myocardial Infarction or myocardial injury due to an underlying chronic condition.       Results may be falsely decreased if patient taking Biotin.      D-dimer, Quantitative [[id number]]  (Abnormal) Collected: 12/31/21 1739    Specimen: Blood Updated: 12/31/21 1832     D-Dimer, Quantitative 740 ng/mL (FEU)     Narrative:      Dimer values <500 ng/ml FEU are FDA approved as aid in diagnosis of deep venous thrombosis and pulmonary embolism.  This test should not be used in an exclusion strategy with pretest probability alone.    A recent guideline regarding diagnosis for pulmonary thromboembolism recommends an adjusted exclusion criterion of age x 10 ng/ml FEU for patients >50 years of age ([employer] Intern Med 2015; 163: 701-711).            Imaging Results (Last 24 Hours)     ** No results found for the last 24 hours. **          Assessment/Plan       Paroxysmal atrial fibrillation with rapid ventricular response (HCC)    Acute on chronic systolic CHF (congestive heart failure) (HCC)    Moderate malnutrition (CMS/HCC)    Continue with the IV Lasix, Metoprolol at smaller doses given the borderline blood pressure values, continue with the Amiodarone and Eliquis. Cardiology consult appreciated.    The recent Echo shows a poor LV EF of 20 to 25%. Repeat ECG in AM.    I confirmed that the patient's Advance Care Plan is present, code status is documented, or surrogate decision maker is listed in the patient's medical record.      Celso Méndez MD  01/01/22  14:15 CST        Electronically signed by Celso Méndez MD at 01/01/22 1415       Medical Student Notes (last 7 days)  Notes from 12/31/21 1301 through 01/07/22 1301   No notes of this type exist for this encounter.            Consult Notes (last 7 days)      Altagracia Mantilla MD at 01/01/22 1333      Consult Orders    1. Inpatient Cardiology Consult [[id number]] ordered by Celso Méndez MD at 12/31/21 1656                Cardiology at [facility]  [place]  Cardiovascular Consultation Note      Kd Campa  15/A  [id number]  1960    DATE OF ADMISSION: 12/31/2021  DATE OF CONSULTATION:  1/1/2022    Estella Gutierrez MD  Treatment Team:   Attending Provider: Celso Méndez MD  Consulting Physician: Altagracia Mantilla MD  Admitting Provider: Celso Méndez MD    Chief Complaint   Patient presents with   • Shortness of Breath   • Fluid Retention       Chief complaint/ Reason for Consultation atrial fibrillation with rapid ventricular rate and congestive heart failure due to reduced left and systolic function      History of Present Illness  61 years old patient with background history of paroxysmal atrial fibrillation s/p EP study and A. fib ablation in the [place] area more than 3 years ago and a history of congestive heart failure have some weak muscle was on Lasix and blood pressure medicine discontinued.  The patient presented for evaluation increasing shortness of breath functional class III, no  orthopnea or PND noted in atrial fibrillation with rapid ventricular rate and clinically patient was in congestive heart failure with elevated BNP mild lower extremity edema and chest x-ray bilateral pleural effusion no evidence of pulmonary embolism on CT pulmonary angiogram.  Troponin is normal.  Patient received 80 mg Lasix yesterday with a significant provement in shortness of breath and good diuresis.  He has marginal hemodynamic and we are unable to uptitrate beta-blocker and to start Aldactone or ACE/ARB at this stage.  Patient is on IV amiodarone with improvement in heart rate.  Last blood pressure systolic was 91 and echocardiogram severe LV dysfunction global hypokinesis significant elevated left atrial volume and moderate mitral and tricuspid regurgitations    Echo 1/1/2022    · The left ventricular cavity is moderately dilated.  · Estimated left ventricular EF = 22% Left ventricular ejection fraction appears to be 21 - 25%. Left  ventricular systolic function is severely decreased.  · Left ventricular diastolic dysfunction is noted.  · Left atrial volume is severely increased.  · The right atrial cavity is mildly dilated.  · Moderate mitral valve regurgitation is present.  · Moderate tricuspid valve regurgitation is present.  · There is a left pleural effusion.        proBNP   0.0 - 900.0 pg/mL 6,616.0 High   7,792.0 High       CT pulmonary angiogram    IMPRESSION:  1. No evidence for pulmonary embolus.  2. Moderate-sized bilateral pleural effusions with dependent  bibasilar atelectasis.    Past Medical History:   Diagnosis Date   • Atrial fibrillation (HCC)    • BPH (benign prostatic hyperplasia)    • Chronic bronchitis (HCC)    • Chronic systolic heart failure (HCC)    • Depression    • GERD (gastroesophageal reflux disease)        Past Surgical History:   Procedure Laterality Date   • CARDIAC ELECTROPHYSIOLOGY STUDY AND ABLATION     • CARDIOVERSION         No Known Allergies    No current facility-administered medications on file prior to encounter.     Current Outpatient Medications on File Prior to Encounter   Medication Sig Dispense Refill   • aspirin 81 MG chewable tablet Chew 325 mg Daily.     • omeprazole (priLOSEC) 40 MG capsule Take 40 mg by mouth Daily.     • tamsulosin (FLOMAX) 0.4 MG capsule 24 hr capsule Take 1 capsule by mouth Daily.     • venlafaxine (EFFEXOR) 75 MG tablet Take 75 mg by mouth 2 (Two) Times a Day.     • dilTIAZem XR (DILACOR XR) 180 MG 24 hr capsule Take 1 capsule by mouth Daily for 14 days. 14 capsule 0       Social History     Socioeconomic History   • Marital status:    Tobacco Use   • Smoking status: Current Some Day Smoker   • Smokeless tobacco: Never Used   Substance and Sexual Activity   • Alcohol use: Not Currently   • Drug use: Not Currently   • Sexual activity: Not Currently           Review of Systems:     Constitutional: Positive for fatigue. Negative for fever.   Respiratory: Positive for  "shortness of breath. Negative for cough.    Cardiovascular: Positive for leg swelling. Negative for chest pain and palpitations.   Gastrointestinal: Negative for abdominal distention, abdominal pain, blood in stool, diarrhea and vomiting.   Genitourinary: Negative for dysuria, frequency, genital sores, penile discharge, penile swelling and urgency.   Musculoskeletal: Positive for arthralgias. Negative for back pain.   Skin: Positive for pallor. Negative for color change and rash.   Neurological: Positive for weakness. Negative for seizures, syncope, light-headedness and headaches.   Psychiatric/Behavioral: Negative for agitation, behavioral problems and confusion.   All other systems reviewed and are negative.         Objective:     Vitals:    01/01/22 1030 01/01/22 1100 01/01/22 1140 01/01/22 1200   BP: (!) 84/58 110/71 91/67    BP Location:       Patient Position:       Pulse: (!) 121 (!) 121 (!) 121    Resp:       Temp:    96.9 °F (36.1 °C)   TempSrc:       SpO2: 94% 95% 98%    Weight:       Height:         Body mass index is 27.06 kg/m².  Flowsheet Rows      First Filed Value   Admission Height 190.5 cm (75\") Documented at 12/31/2021 1602   Admission Weight 87.1 kg (192 lb 0.3 oz) Documented at 12/31/2021 1559          Intake/Output Summary (Last 24 hours) at 1/1/2022 1333  Last data filed at 1/1/2022 1016  Gross per 24 hour   Intake 903.5 ml   Output 1170 ml   Net -266.5 ml         Physical Exam   Constitutional: Cooperative, alert and oriented, well developed, well nourished, in no acute distress.     HENT:   Head: Normocephalic, conjunctivae is a pink, thyroid is nonpalpable no carotid bruit and trachea central.     Cardiovascular: Tachycardia with irregular rate rhythm positive systolic murmur at the mitral valve area at length left sternal border    Pulmonary/Chest: Chest: Decreased bilateral air entry with crackles    Abdominal: Abdomen soft, bowel sounds normoactive, no masses.    Musculoskeletal: No " deformities, clubbing, cyanosis, erythema. Positive mild edema.    Neurological: No gross motor or sensory deficits noted    Skin: Warm and dry to the touch, no apparent skin lesions .     Psychiatric: Normal mood and affect. Behavior is normal.    Radiology Review    XR Chest 1 View   Final Result   1.  Left upper lobe partially calcified granuloma is again noted   as seen on recent CT chest exam of December 30, 2021. This would   be consistent with old granulomatous disease.   2.  Otherwise unremarkable chest.      Electronically signed by:  Vikas Pitt MD  12/31/2021 9:31 AM CST   Workstation: [id number]          Lab Results:  Lab Results (last 24 hours)     Procedure Component Value Units Date/Time    Troponin [[id number]]  (Normal) Collected: 12/31/21 1739    Specimen: Blood Updated: 12/31/21 1847     Troponin T <0.010 ng/mL     Narrative:      Troponin T Reference Range:  <= 0.03 ng/mL-   Negative for AMI  >0.03 ng/mL-     Abnormal for myocardial necrosis.  Clinicians would have to utilize clinical acumen, EKG, Troponin and serial changes to determine if it is an Acute Myocardial Infarction or myocardial injury due to an underlying chronic condition.       Results may be falsely decreased if patient taking Biotin.      D-dimer, Quantitative [[id number]]  (Abnormal) Collected: 12/31/21 1739    Specimen: Blood Updated: 12/31/21 1832     D-Dimer, Quantitative 740 ng/mL (FEU)     Narrative:      Dimer values <500 ng/ml FEU are FDA approved as aid in diagnosis of deep venous thrombosis and pulmonary embolism.  This test should not be used in an exclusion strategy with pretest probability alone.    A recent guideline regarding diagnosis for pulmonary thromboembolism recommends an adjusted exclusion criterion of age x 10 ng/ml FEU for patients >50 years of age ([employer] Intern Med 2015; 163: 701-711).            I personally viewed and interpreted the patient's EKG/Telemetry data       Assessment/Plan:   #1 atrial  fibrillation with rapid ventricular rate of unknown duration    61 years old patient with previous history of paroxysmal atrial fibrillation s/p EP study and A. fib ablation at [place] more than 3 years ago had a history of congestive heart failure and left ventricular dysfunction not sure but ejection fraction.  Patient clinically in heart failure responded to IV diuretics had marginal hemodynamic.  We will continue IV amiodarone and also start the oral amiodarone.  We will try low-dose metoprolol but will hold ACE/ARB and Aldactone at this stage given the marginal hemodynamic    #2 congestive heart failure possible acute on chronic    She denies history of myocardial infarction in the past.  He does have history of congestive heart failure but doesn't know left ventricle systolic function.  Recent echocardiogram revealed severe left and systolic dysfunction dilated left ventricle severely elevated left atrial volume moderate mitral tricuspid regurgitations.  His hemodynamics are marginal we'll continue IV amiodarone and also start amiodarone 200 mg twice a day and start low-dose beta-blocker we'll hold ACE/ARB and Aldactone at this stage given the marginal hemodynamic I will arrange CHF evaluation  Given severe LV dysfunction he does need some ischemic evaluation timing depends on patient clinical condition and hospital course    Restrict 1000 fluid    Sodium 2 g to  Patient was counseled educated avoid nonsteroid and drink      #3 moderate mitral tricuspid regurgitations will reassess after electrical cardioversions as an outpatient    I spent 45  minutes caring for Kd on this date of service. This time includes time spent by me includes counseling/coordination of care as relates to the presenting problem and any ordered procedures/tests as outlined above.           Thank you for allowing me to participate in the care of Kd Tyrese aCmpa. Feel free to contact me directly with any further questions or  concerns.    Altagracia Mantilla MD  22  13:33 CST.      Part of this note may be an electronic transcription/translation of spoken language to printed text using the Dragon Dictation system.        Electronically signed by Altagracia Mantilla MD at 22 1312     Becka Aparicio MD at 22 1243      Consult Orders    1. Inpatient Nephrology Consult [[id number]] ordered by Efrain Arevalo MD at 22 1126               [facility]  [address] [place] [address]. [address]  T - [phone]  F - [phone]     Consultation         PATIENT  DEMOGRAPHICS   PATIENT NAME: Kd Campa                      PHYSICIAN: GERALD Bryant  : 1960  MRN: [id number]    Subjective   SUBJECTIVE   Referring Provider: Dr. Arevalo  Reason for Consultation: CAROLYN  History of present illness: This is a 61-year-old male with a past medical history significant for GERD, depression, CHF, BPH, atrial fibrillation who presented to the hospital on  with complaints of shortness of breath and palpitations.  He was found to have atrial fibrillation with RVR as well as symptoms of systolic heart failure and was admitted for further treatment.  Cardiology was consulted and the patient was diuresed effectively with improvement in symptoms.  He has also been treated with dobutamine infusion.  He has had intermittent low blood pressure as well as work-up for possible PE which was negative.  Unfortunately he now has worsening renal function and nephrology is consulted to help manage CAROLYN.    Past Medical History:   Diagnosis Date   • Atrial fibrillation (HCC)    • BPH (benign prostatic hyperplasia)    • Chronic bronchitis (HCC)    • Chronic systolic heart failure (HCC)    • Depression    • GERD (gastroesophageal reflux disease)      Past Surgical History:   Procedure Laterality Date   • CARDIAC ELECTROPHYSIOLOGY STUDY AND ABLATION     • CARDIOVERSION       Family History   Problem Relation Age of  "Onset   • Hypertension Mother      Social History     Tobacco Use   • Smoking status: Current Some Day Smoker   • Smokeless tobacco: Never Used   Substance Use Topics   • Alcohol use: Not Currently   • Drug use: Not Currently     Allergies:  Patient has no known allergies.     REVIEW OF SYSTEMS    Review of Systems   All other systems reviewed and are negative.      Objective   OBJECTIVE   Vital Signs  Temp:  [96.7 °F (35.9 °C)-97.8 °F (36.6 °C)] 96.7 °F (35.9 °C)  Heart Rate:  [] 68  Resp:  [15-22] 17  BP: ()/(54-84) 102/70    Flowsheet Rows        First Filed Value   Admission Height 190.5 cm (75\") Documented at 12/31/2021 1602   Admission Weight 87.1 kg (192 lb 0.3 oz) Documented at 12/31/2021 1559             I/O last 3 completed shifts:  In: 1749.3 [P.O.:840; I.V.:709.3; IV Piggyback:200]  Out: 725 [Urine:725]    PHYSICAL EXAM    Physical Exam  Constitutional:       Appearance: He is well-developed.   HENT:      Head: Normocephalic and atraumatic.   Eyes:      Conjunctiva/sclera: Conjunctivae normal.      Pupils: Pupils are equal, round, and reactive to light.   Cardiovascular:      Rate and Rhythm: Normal rate and regular rhythm.   Pulmonary:      Effort: Pulmonary effort is normal.      Breath sounds: Normal breath sounds.   Abdominal:      Palpations: Abdomen is soft.   Musculoskeletal:      Cervical back: Neck supple.      Right lower leg: Edema present.      Left lower leg: Edema present.   Skin:     General: Skin is warm and dry.   Neurological:      Mental Status: He is alert and oriented to person, place, and time.   Psychiatric:         Mood and Affect: Mood normal.         Behavior: Behavior normal.         RESULTS   Results Review:    Results from last 7 days   Lab Units 01/04/22  0725 01/03/22  0612 01/02/22  1348 01/02/22  0313 12/31/21  0849 12/30/21  1100 12/30/21  1100   SODIUM mmol/L 135* 140 139   < > 139   < > 135*   POTASSIUM mmol/L 4.8 3.5 3.7   < > 3.8   < > 3.8   CHLORIDE " mmol/L 98 101 101   < > 103   < > 101   CO2 mmol/L 24.0 26.0 22.0   < > 24.0   < > 20.0*   BUN mg/dL 36* 30* 29*   < > 22   < > 26*   CREATININE mg/dL 2.61* 1.60* 1.84*   < > 1.12   < > 1.01   CALCIUM mg/dL 9.3 8.9 9.1   < > 9.1   < > 9.1   BILIRUBIN mg/dL 1.3*  --   --   --  1.3*  --  0.9   ALK PHOS U/L 66  --   --   --  87  --  85   ALT (SGPT) U/L 437*  --   --   --  16  --  17   AST (SGOT) U/L 842*  --   --   --  23  --  22   GLUCOSE mg/dL 154* 103* 202*   < > 142*   < > 151*    < > = values in this interval not displayed.       Estimated Creatinine Clearance: 39.7 mL/min (A) (by C-G formula based on SCr of 2.61 mg/dL (H)).    Results from last 7 days   Lab Units 01/02/22  0313   MAGNESIUM mg/dL 1.7             Results from last 7 days   Lab Units 01/04/22  0726 01/02/22  1348 12/31/21  0849 12/30/21  1101   WBC 10*3/mm3 12.55* 10.04 7.43 7.38   HEMOGLOBIN g/dL 14.1 15.1 15.1 15.3   PLATELETS 10*3/mm3 177 206 191 192       Results from last 7 days   Lab Units 01/03/22  0612   INR  1.79*        MEDICATIONS    albumin human, 25 g, Intravenous, BID  apixaban, 5 mg, Oral, Q12H  furosemide, 40 mg, Intravenous, Once  lactated ringers, 250 mL, Intravenous, Once  losartan, 25 mg, Oral, Q24H  metoprolol succinate XL, 25 mg, Oral, Q24H  pantoprazole, 40 mg, Intravenous, Q AM  sodium chloride, 10 mL, Intravenous, Q12H  spironolactone, 12.5 mg, Oral, Daily  tamsulosin, 0.4 mg, Oral, Nightly  venlafaxine, 75 mg, Oral, BID      amiodarone, 0.5 mg/min, Last Rate: 0.5 mg/min (01/04/22 0205)  DOBUTamine, 5 mcg/kg/min, Last Rate: 5 mcg/kg/min (01/04/22 0930)      Medications Prior to Admission   Medication Sig Dispense Refill Last Dose   • aspirin 81 MG chewable tablet Chew 325 mg Daily.   Past Week at Unknown time   • omeprazole (priLOSEC) 40 MG capsule Take 40 mg by mouth Daily.   12/31/2021 at Unknown time   • tamsulosin (FLOMAX) 0.4 MG capsule 24 hr capsule Take 1 capsule by mouth Daily.   12/31/2021 at Unknown time   •  venlafaxine (EFFEXOR) 75 MG tablet Take 75 mg by mouth 2 (Two) Times a Day.   12/31/2021 at Unknown time   • dilTIAZem XR (DILACOR XR) 180 MG 24 hr capsule Take 1 capsule by mouth Daily for 14 days. 14 capsule 0      Assessment/Plan   ASSESSMENT / PLAN      Paroxysmal atrial fibrillation with rapid ventricular response (HCC)    Acute on chronic systolic CHF (congestive heart failure) (HCC)    Moderate malnutrition (CMS/HCC)    Hypotension    1.  CAROLYN- Baseline creatinine around 1.1, creatinine now up to 2.6.  Etiology is likely multifactorial secondary to dye exposure from CTA, diuretics, as well as intermittent hypotension.  At present we will hold his diuretics, continue albumin 3 times daily.  Hold lisinopril, Flomax, spironolactone (due to low bp , carolyn).   Agree with checking renal ultrasound and urine studies.    2.  A. fib with RVR- cardiology following, improved following cardioversion on 1/3/2022. Has prior ablation procedure    3.  Acute on chronic CHF- cardiology following, improved, continue dobutamine    4.  Intermittent hypotension- med changes as above      Thank you for the consult, we will continue to follow the patient.         I discussed the patients findings and my recommendations with patient      This document has been electronically signed by GERALD Bryant on January 4, 2022 12:43 CST      For this patient encounter, I have reviewed the Nurse Practitioner's documentation, medical decision making, and treatment plan and personally spent time with the patient.              Electronically signed by Becka Aparicio MD at 01/04/22 6123

## 2022-01-07 NOTE — PAYOR COMM NOTE
"Michelle Fairchild  Case Management Extender  [phone] phone  [phone] fax    Ref# [id number]      Annamarie Campa (61 y.o. Male)             Date of Birth Social Security Number Address Home Phone MRN    1960  [address] [facility] [address] [phone] [id number]    Nondenominational Marital Status             Anabaptism        Admission Date Admission Type Admitting Provider Attending Provider Department, Room/Bed    12/31/21 Emergency Celso Méndez MD Popescu, Tudor, MD [facility] CRITICAL CARE STEPDOWN, 15/A    Discharge Date Discharge Disposition Discharge Destination                         Attending Provider: Celso Méndez MD    Allergies: No Known Allergies    Isolation: None   Infection: None   Code Status: CPR   Advance Care Planning Activity    Ht: 190.5 cm (75\")   Wt: 88.5 kg (195 lb 1.7 oz)    Admission Cmt: None   Principal Problem: Paroxysmal atrial fibrillation with rapid ventricular response (HCC) [I48.0]                 Active Insurance as of 12/31/2021     Primary Coverage     Payor Plan Insurance Group Employer/Plan Group    AETNA MEDICARE REPLACEMENT AETNA MEDICARE REPLACEMENT [id number]     Payor Plan Address Payor Plan Phone Number Payor Plan Fax Number Effective Dates    [address] [phone]  9/1/2021 - 12/31/2021    [facility] [id number]       Subscriber Name Subscriber Birth Date Member ID       ANNAMARIE CAMPA 1960 [id number]                 Emergency Contacts      (Rel.) Home Phone Work Phone Mobile Phone    Val Doshi (Daughter) [phone] -- [phone]    Jd Campa (Son) -- -- [phone]    lissy leyva (Sister) -- -- [phone]               History & Physical      Celso Méndez MD at 12/31/21 1209          History and Physical  eClso Méndez MD  Hospitalist    Date of admission: 12/31/2021    Patient Care Team:  Estella Gutierrez MD as PCP - General (Family Medicine)    Chief " complaint   Chief Complaint   Patient presents with   • Shortness of Breath   • Palpitations     Subjective     Patient is a 61 y.o. male admitted for palpitations and shortness of breath, symptoms significantly worsen even by minimal exertion.  His longstanding cardiac issues including heart failure, episodes of paroxysmal atrial fibrillation, conditions for which he has seen out-of-town cardiologists.    History  Past Medical History:   Diagnosis Date   • Atrial fibrillation (HCC)    • BPH (benign prostatic hyperplasia)    • Chronic bronchitis (HCC)    • Chronic systolic heart failure (HCC)    • Depression    • GERD (gastroesophageal reflux disease)      Past Surgical History:   Procedure Laterality Date   • CARDIAC ELECTROPHYSIOLOGY STUDY AND ABLATION     • CARDIOVERSION       Family History   Problem Relation Age of Onset   • Hypertension Mother      Social History     Tobacco Use   • Smoking status: Current Some Day Smoker   • Smokeless tobacco: Never Used   Substance Use Topics   • Alcohol use: Not Currently   • Drug use: Not Currently     Medications Prior to Admission   Medication Sig Dispense Refill Last Dose   • aspirin 81 MG chewable tablet Chew 325 mg Daily.   Past Week at Unknown time   • omeprazole (priLOSEC) 40 MG capsule Take 40 mg by mouth Daily.   12/31/2021 at Unknown time   • tamsulosin (FLOMAX) 0.4 MG capsule 24 hr capsule Take 1 capsule by mouth Daily.   12/31/2021 at Unknown time   • venlafaxine (EFFEXOR) 75 MG tablet Take 75 mg by mouth 2 (Two) Times a Day.   12/31/2021 at Unknown time   • dilTIAZem XR (DILACOR XR) 180 MG 24 hr capsule Take 1 capsule by mouth Daily for 14 days. 14 capsule 0      Allergies:  Patient has no known allergies.    Review of Systems  Review of Systems   Constitutional: Positive for fatigue. Negative for fever.   Respiratory: Positive for shortness of breath. Negative for cough.    Cardiovascular: Positive for leg swelling. Negative for chest pain and palpitations.    Gastrointestinal: Negative for abdominal distention, abdominal pain, blood in stool, diarrhea and vomiting.   Genitourinary: Negative for dysuria, frequency, genital sores, penile discharge, penile swelling and urgency.   Musculoskeletal: Positive for arthralgias. Negative for back pain.   Skin: Positive for pallor. Negative for color change and rash.   Neurological: Positive for weakness. Negative for seizures, syncope, light-headedness and headaches.   Psychiatric/Behavioral: Negative for agitation, behavioral problems and confusion.   All other systems reviewed and are negative.      Objective     Vital Signs  Temp:  [97.7 °F (36.5 °C)-97.9 °F (36.6 °C)] 97.9 °F (36.6 °C)  Heart Rate:  [117-137] 125  Resp:  [19-32] 32  BP: (100-157)/() 105/79    Physical Exam:  Physical Exam  Vitals reviewed.   Constitutional:       Appearance: He is ill-appearing.   HENT:      Head: Normocephalic and atraumatic.   Eyes:      General: No scleral icterus.     Extraocular Movements: Extraocular movements intact.      Pupils: Pupils are equal, round, and reactive to light.   Cardiovascular:      Rate and Rhythm: Tachycardia present. Rhythm irregular.   Pulmonary:      Effort: No respiratory distress.      Breath sounds: Normal breath sounds. No stridor. No wheezing or rales.   Abdominal:      General: Bowel sounds are normal. There is no distension.      Palpations: Abdomen is soft. There is no mass.      Tenderness: There is no abdominal tenderness. There is no right CVA tenderness, left CVA tenderness or guarding.      Hernia: No hernia is present.   Musculoskeletal:         General: No swelling, tenderness or deformity.      Cervical back: Normal range of motion and neck supple. No rigidity or tenderness.      Right lower leg: No edema.      Left lower leg: No edema.   Skin:     General: Skin is warm and dry.      Coloration: Skin is pale. Skin is not jaundiced.      Findings: No bruising, erythema or lesion.    Neurological:      General: No focal deficit present.      Mental Status: He is alert and oriented to person, place, and time.      Cranial Nerves: No cranial nerve deficit.      Motor: No weakness.   Psychiatric:         Mood and Affect: Mood normal.         Behavior: Behavior normal.         Results Review:   Lab Results (last 24 hours)     Procedure Component Value Units Date/Time    Troponin [[id number]]  (Normal) Collected: 12/31/21 1739    Specimen: Blood Updated: 12/31/21 1847     Troponin T <0.010 ng/mL     Narrative:      Troponin T Reference Range:  <= 0.03 ng/mL-   Negative for AMI  >0.03 ng/mL-     Abnormal for myocardial necrosis.  Clinicians would have to utilize clinical acumen, EKG, Troponin and serial changes to determine if it is an Acute Myocardial Infarction or myocardial injury due to an underlying chronic condition.       Results may be falsely decreased if patient taking Biotin.      D-dimer, Quantitative [[id number]]  (Abnormal) Collected: 12/31/21 1739    Specimen: Blood Updated: 12/31/21 1832     D-Dimer, Quantitative 740 ng/mL (FEU)     Narrative:      Dimer values <500 ng/ml FEU are FDA approved as aid in diagnosis of deep venous thrombosis and pulmonary embolism.  This test should not be used in an exclusion strategy with pretest probability alone.    A recent guideline regarding diagnosis for pulmonary thromboembolism recommends an adjusted exclusion criterion of age x 10 ng/ml FEU for patients >50 years of age (Marci Intern Med 2015; 163: 701-711).      Troponin [[id number]]  (Normal) Collected: 12/31/21 1009    Specimen: Blood Updated: 12/31/21 1028     Troponin T <0.010 ng/mL     Narrative:      Troponin T Reference Range:  <= 0.03 ng/mL-   Negative for AMI  >0.03 ng/mL-     Abnormal for myocardial necrosis.  Clinicians would have to utilize clinical acumen, EKG, Troponin and serial changes to determine if it is an Acute Myocardial Infarction or myocardial injury due to an underlying  chronic condition.       Results may be falsely decreased if patient taking Biotin.      [place] Draw [[id number]] Collected: 12/31/21 0849    Specimen: Blood Updated: 12/31/21 1000    Narrative:      The following orders were created for panel order [place] Draw.  Procedure                               Abnormality         Status                     ---------                               -----------         ------                     Green Top (Gel)[[id number]]                                  Final result               Lavender Top[[id number]]                                     Final result               Gold Top - SST[[id number]]                                                              Light Blue Top[[id number]]                                   Final result                 Please view results for these tests on the individual orders.    Lavender Top [[id number]] Collected: 12/31/21 0849    Specimen: Blood Updated: 12/31/21 1000     Extra Tube hold for add-on     Comment: Auto resulted       Light Blue Top [[id number]] Collected: 12/31/21 0849    Specimen: Blood Updated: 12/31/21 1000     Extra Tube hold for add-on     Comment: Auto resulted       Green Top (Gel) [[id number]] Collected: 12/31/21 0849    Specimen: Blood Updated: 12/31/21 1000     Extra Tube Hold for add-ons.     Comment: Auto resulted.       Comprehensive Metabolic Panel [[id number]]  (Abnormal) Collected: 12/31/21 0849    Specimen: Blood Updated: 12/31/21 0919     Glucose 142 mg/dL      BUN 22 mg/dL      Creatinine 1.12 mg/dL      Sodium 139 mmol/L      Potassium 3.8 mmol/L      Chloride 103 mmol/L      CO2 24.0 mmol/L      Calcium 9.1 mg/dL      Total Protein 6.7 g/dL      Albumin 3.80 g/dL      ALT (SGPT) 16 U/L      AST (SGOT) 23 U/L      Alkaline Phosphatase 87 U/L      Total Bilirubin 1.3 mg/dL      eGFR Non African Amer 66 mL/min/1.73      Globulin 2.9 gm/dL      A/G Ratio 1.3 g/dL      BUN/Creatinine Ratio 19.6     Anion Gap 12.0 mmol/L      Narrative:      GFR Normal >60  Chronic Kidney Disease <60  Kidney Failure <15      Troponin [[id number]]  (Normal) Collected: 12/31/21 0849    Specimen: Blood Updated: 12/31/21 0917     Troponin T <0.010 ng/mL     Narrative:      Troponin T Reference Range:  <= 0.03 ng/mL-   Negative for AMI  >0.03 ng/mL-     Abnormal for myocardial necrosis.  Clinicians would have to utilize clinical acumen, EKG, Troponin and serial changes to determine if it is an Acute Myocardial Infarction or myocardial injury due to an underlying chronic condition.       Results may be falsely decreased if patient taking Biotin.      BNP [[id number]]  (Abnormal) Collected: 12/31/21 0849    Specimen: Blood Updated: 12/31/21 0914     proBNP 6,616.0 pg/mL     Narrative:      Among patients with dyspnea, NT-proBNP is highly sensitive for the detection of acute congestive heart failure. In addition NT-proBNP of <300 pg/ml effectively rules out acute congestive heart failure with 99% negative predictive value.    Results may be falsely decreased if patient taking Biotin.      CBC & Differential [[id number]]  (Normal) Collected: 12/31/21 0849    Specimen: Blood Updated: 12/31/21 0852    Narrative:      The following orders were created for panel order CBC & Differential.  Procedure                               Abnormality         Status                     ---------                               -----------         ------                     CBC Auto Differential[[id number]]        Normal              Final result                 Please view results for these tests on the individual orders.    CBC Auto Differential [[id number]]  (Normal) Collected: 12/31/21 0849    Specimen: Blood Updated: 12/31/21 0852     WBC 7.43 10*3/mm3      RBC 4.73 10*6/mm3      Hemoglobin 15.1 g/dL      Hematocrit 44.9 %      MCV 94.9 fL      MCH 31.9 pg      MCHC 33.6 g/dL      RDW 13.7 %      RDW-SD 47.7 fl      MPV 10.8 fL      Platelets 191 10*3/mm3      Neutrophil % 68.7 %       Lymphocyte % 21.5 %      Monocyte % 8.2 %      Eosinophil % 0.9 %      Basophil % 0.4 %      Immature Grans % 0.3 %      Neutrophils, Absolute 5.10 10*3/mm3      Lymphocytes, Absolute 1.60 10*3/mm3      Monocytes, Absolute 0.61 10*3/mm3      Eosinophils, Absolute 0.07 10*3/mm3      Basophils, Absolute 0.03 10*3/mm3      Immature Grans, Absolute 0.02 10*3/mm3      nRBC 0.0 /100 WBC           Imaging Results (Last 24 Hours)     Procedure Component Value Units Date/Time    XR Chest 1 View [[id number]] Collected: 12/31/21 0828     Updated: 12/31/21 0932    Narrative:        PROCEDURE: Single chest view portable    REASON FOR EXAM:Chest pain protocol  chest pain protocol    FINDINGS: Comparison exam dated December 30, 2021. Cardiac and  pulmonary vasculature are normal. Left upper lobe partially  calcified granuloma is again noted as seen on recent CT chest  exam of December 30, 2021. Lungs are otherwise clear. Pleural  spaces are normal. No acute osseous abnormality.      Impression:      1.  Left upper lobe partially calcified granuloma is again noted  as seen on recent CT chest exam of December 30, 2021. This would  be consistent with old granulomatous disease.  2.  Otherwise unremarkable chest.    Electronically signed by:  Vikas Pitt MD  12/31/2021 9:31 AM CST  Workstation: [id number]          Assessment/Plan       Paroxysmal atrial fibrillation with rapid ventricular response (HCC)    Chronic systolic CHF (congestive heart failure) (HCC)    Start the patient on IV Amiodarone drip with a loading dose, IV Lasix, beta blocker and Cozaar, obtain Echo, repeat ECG in AM, consult Cardiology.    I have utilized all available immediate resources to obtain, update, or review the patient's current medications.    I confirmed that the patient's Advance Care Plan is present, code status is documented, or surrogate decision maker is listed in the patient's medical record.      12/31/21  19:30 CST      Electronically signed  by Celso Méndez MD at 12/31/21 1931          Emergency Department Notes      Attila Stratton MD at 12/31/21 0864          Subjective   Patient presents to be emergency department with history of rapid heart rate weakness dyspnea on exertion and lower extremity edema.  Patient notes he is not taking in medication, had a remote history of atrial for which had electrical cardioversion.  Patient states he was on some cardiac medications as well as Lasix for a time both of those have been decreased for over a year to many years.  Patient is very unsure of the timeline.  Patient states his family finally convinced him to come in for his symptoms of dyspnea on exertion as he is no longer able even across the room without difficulty with his breathing.  Patient is not on home oxygen.  Patient was seen at the hospital yesterday and left AGAINST MEDICAL ADVICE secondary to having taking care of his animals in his back today and similar symptomatology as yesterday.          Review of Systems   Constitutional: Positive for activity change and appetite change. Negative for chills and fever.   HENT: Negative.  Negative for congestion.    Eyes: Negative.  Negative for photophobia and visual disturbance.   Respiratory: Positive for shortness of breath. Negative for cough and chest tightness.    Cardiovascular: Positive for palpitations. Negative for chest pain.   Gastrointestinal: Negative.  Negative for abdominal pain, constipation, diarrhea, nausea and vomiting.   Endocrine: Negative.    Genitourinary: Negative.  Negative for decreased urine volume, dysuria, flank pain and hematuria.   Musculoskeletal: Negative.  Negative for arthralgias, back pain, myalgias, neck pain and neck stiffness.   Skin: Negative.  Negative for pallor.   Neurological: Negative.  Negative for dizziness, syncope, weakness, light-headedness, numbness and headaches.   Psychiatric/Behavioral: Negative.  Negative for confusion and suicidal ideas. The  patient is not nervous/anxious.    All other systems reviewed and are negative.      Past Medical History:   Diagnosis Date   • CHF (congestive heart failure) (HCC)        No Known Allergies    History reviewed. No pertinent surgical history.    History reviewed. No pertinent family history.    Social History     Socioeconomic History   • Marital status:    Tobacco Use   • Smoking status: Current Some Day Smoker   • Smokeless tobacco: Never Used   Substance and Sexual Activity   • Alcohol use: Not Currently   • Drug use: Never   • Sexual activity: Defer           Objective   Physical Exam  Vitals and nursing note reviewed.   Constitutional:       General: He is not in acute distress.     Appearance: He is well-developed. He is not ill-appearing or toxic-appearing.   HENT:      Head: Normocephalic and atraumatic.   Eyes:      Extraocular Movements: Extraocular movements intact.      Conjunctiva/sclera: Conjunctivae normal.      Pupils: Pupils are equal, round, and reactive to light.   Neck:      Vascular: No JVD.   Cardiovascular:      Rate and Rhythm: Tachycardia present. Rhythm irregular.      Heart sounds: Normal heart sounds. No murmur heard.  No friction rub. No gallop.    Pulmonary:      Effort: Pulmonary effort is normal. Tachypnea present. No respiratory distress.      Breath sounds: No wheezing or rales.   Chest:      Chest wall: No tenderness.   Abdominal:      General: Bowel sounds are normal. There is no distension.      Palpations: Abdomen is soft. There is no mass.      Tenderness: There is no abdominal tenderness. There is no guarding or rebound.   Musculoskeletal:         General: Normal range of motion.      Cervical back: Normal range of motion and neck supple.   Skin:     General: Skin is warm and dry.      Capillary Refill: Capillary refill takes less than 2 seconds.   Neurological:      General: No focal deficit present.      Mental Status: He is alert and oriented to person, place, and  time.   Psychiatric:         Behavior: Behavior normal.         Thought Content: Thought content normal.         Judgment: Judgment normal.         Procedures          ED Course  ED Course as of 12/31/21 1110   Fri Dec 31, 2021   1110 Atrial fibrillation with RVR with atrial flutter.  Patient currently continues with rapid ventricular response.  Patient on diltiazem drip will be admitted for further cardiology work-up.  Very subacute symptoms possibly lasting months.  Patient was anticoagulated with Lovenox in the ED. [PC]      ED Course User Index  [PC] Attila Stratton MD                                         Labs Reviewed   COMPREHENSIVE METABOLIC PANEL - Abnormal; Notable for the following components:       Result Value    Glucose 142 (*)     Total Bilirubin 1.3 (*)     All other components within normal limits    Narrative:     GFR Normal >60  Chronic Kidney Disease <60  Kidney Failure <15     BNP (IN-HOUSE) - Abnormal; Notable for the following components:    proBNP 6,616.0 (*)     All other components within normal limits    Narrative:     Among patients with dyspnea, NT-proBNP is highly sensitive for the detection of acute congestive heart failure. In addition NT-proBNP of <300 pg/ml effectively rules out acute congestive heart failure with 99% negative predictive value.    Results may be falsely decreased if patient taking Biotin.     TROPONIN (IN-HOUSE) - Normal    Narrative:     Troponin T Reference Range:  <= 0.03 ng/mL-   Negative for AMI  >0.03 ng/mL-     Abnormal for myocardial necrosis.  Clinicians would have to utilize clinical acumen, EKG, Troponin and serial changes to determine if it is an Acute Myocardial Infarction or myocardial injury due to an underlying chronic condition.       Results may be falsely decreased if patient taking Biotin.     TROPONIN (IN-HOUSE) - Normal    Narrative:     Troponin T Reference Range:  <= 0.03 ng/mL-   Negative for AMI  >0.03 ng/mL-     Abnormal for myocardial  necrosis.  Clinicians would have to utilize clinical acumen, EKG, Troponin and serial changes to determine if it is an Acute Myocardial Infarction or myocardial injury due to an underlying chronic condition.       Results may be falsely decreased if patient taking Biotin.     CBC WITH AUTO DIFFERENTIAL - Normal   RAINBOW DRAW    Narrative:     The following orders were created for panel order [place] Draw.  Procedure                               Abnormality         Status                     ---------                               -----------         ------                     Green Top (Gel)[[id number]]                                  Final result               Lavender Top[[id number]]                                     Final result               Gold Top - SST[[id number]]                                                              Light Blue Top[[id number]]                                   Final result                 Please view results for these tests on the individual orders.   URINALYSIS W/ MICROSCOPIC IF INDICATED (NO CULTURE)   CBC AND DIFFERENTIAL    Narrative:     The following orders were created for panel order CBC & Differential.  Procedure                               Abnormality         Status                     ---------                               -----------         ------                     CBC Auto Differential[[id number]]        Normal              Final result                 Please view results for these tests on the individual orders.   GREEN TOP   LAVENDER TOP   LIGHT BLUE TOP       XR Chest 1 View   Final Result   1.  Left upper lobe partially calcified granuloma is again noted   as seen on recent CT chest exam of December 30, 2021. This would   be consistent with old granulomatous disease.   2.  Otherwise unremarkable chest.      Electronically signed by:  Vikas Pitt MD  12/31/2021 9:31 AM [facility]   Workstation: [id number]                [facility]    Final diagnoses:   Atrial flutter with  rapid ventricular response (HCC)   SHI (dyspnea on exertion)   Acute congestive heart failure, unspecified heart failure type (HCC)   Peripheral edema       ED Disposition  ED Disposition     ED Disposition Condition Comment    Decision to Admit  Level of Care: Stepdown [25]   Diagnosis: Atrial flutter with rapid ventricular response (HCC) [399842]   Admitting Physician: ERASMO MÉNDEZ [[id number]]   Attending Physician: ERASMO MÉNDEZ [[id number]]   Isolate for COVID?: No [0]   Certification: I Certify That Inpatient Hospital Services Are Medically Necessary For Greater Than 2 Midnights            No follow-up provider specified.       Medication List      No changes were made to your prescriptions during this visit.          Attila Stratton MD  12/31/21 1110      Electronically signed by Attila Stratton MD at 12/31/21 1110     Karla Bojorquez RN at 12/31/21 1110        This RN notified Dr. Méndez of patients HR not decreasing with 15mg/hr of cardizem. No new orders at this time.      Karla Bojorquez RN  12/31/21 1129      Electronically signed by Karla Bojorquez RN at 12/31/21 1129     Karla Bojorquez RN at 12/31/21 1356        Report given to MILAD Shah in CCU     Karla Bojorquez RN  12/31/21 1356      Electronically signed by Karla Bojorquez RN at 12/31/21 1356

## 2022-01-08 VITALS
BODY MASS INDEX: 23.77 KG/M2 | TEMPERATURE: 96.8 F | OXYGEN SATURATION: 92 % | HEART RATE: 73 BPM | HEIGHT: 75 IN | RESPIRATION RATE: 16 BRPM | DIASTOLIC BLOOD PRESSURE: 97 MMHG | SYSTOLIC BLOOD PRESSURE: 142 MMHG | WEIGHT: 191.14 LBS

## 2022-01-08 LAB
ALBUMIN SERPL-MCNC: 3.8 G/DL (ref 3.5–5.2)
ALBUMIN/GLOB SERPL: 1.8 G/DL
ALP SERPL-CCNC: 66 U/L (ref 39–117)
ALT SERPL W P-5'-P-CCNC: 268 U/L (ref 1–41)
ANION GAP SERPL CALCULATED.3IONS-SCNC: 12 MMOL/L (ref 5–15)
AST SERPL-CCNC: 107 U/L (ref 1–40)
BASOPHILS # BLD AUTO: 0.03 10*3/MM3 (ref 0–0.2)
BASOPHILS NFR BLD AUTO: 0.5 % (ref 0–1.5)
BILIRUB SERPL-MCNC: 1.7 MG/DL (ref 0–1.2)
BUN SERPL-MCNC: 23 MG/DL (ref 8–23)
BUN/CREAT SERPL: 22.3 (ref 7–25)
CALCIUM SPEC-SCNC: 8.9 MG/DL (ref 8.6–10.5)
CHLORIDE SERPL-SCNC: 100 MMOL/L (ref 98–107)
CO2 SERPL-SCNC: 27 MMOL/L (ref 22–29)
CREAT SERPL-MCNC: 1.03 MG/DL (ref 0.76–1.27)
DEPRECATED RDW RBC AUTO: 46.4 FL (ref 37–54)
EOSINOPHIL # BLD AUTO: 0.1 10*3/MM3 (ref 0–0.4)
EOSINOPHIL NFR BLD AUTO: 1.5 % (ref 0.3–6.2)
ERYTHROCYTE [DISTWIDTH] IN BLOOD BY AUTOMATED COUNT: 13.9 % (ref 12.3–15.4)
GFR SERPL CREATININE-BSD FRML MDRD: 73 ML/MIN/1.73
GLOBULIN UR ELPH-MCNC: 2.1 GM/DL
GLUCOSE SERPL-MCNC: 133 MG/DL (ref 65–99)
HCT VFR BLD AUTO: 37.3 % (ref 37.5–51)
HGB BLD-MCNC: 12.7 G/DL (ref 13–17.7)
IMM GRANULOCYTES # BLD AUTO: 0.01 10*3/MM3 (ref 0–0.05)
IMM GRANULOCYTES NFR BLD AUTO: 0.2 % (ref 0–0.5)
LYMPHOCYTES # BLD AUTO: 1.28 10*3/MM3 (ref 0.7–3.1)
LYMPHOCYTES NFR BLD AUTO: 19.2 % (ref 19.6–45.3)
MCH RBC QN AUTO: 30.9 PG (ref 26.6–33)
MCHC RBC AUTO-ENTMCNC: 34 G/DL (ref 31.5–35.7)
MCV RBC AUTO: 90.8 FL (ref 79–97)
MONOCYTES # BLD AUTO: 0.71 10*3/MM3 (ref 0.1–0.9)
MONOCYTES NFR BLD AUTO: 10.7 % (ref 5–12)
NEUTROPHILS NFR BLD AUTO: 4.52 10*3/MM3 (ref 1.7–7)
NEUTROPHILS NFR BLD AUTO: 67.9 % (ref 42.7–76)
NRBC BLD AUTO-RTO: 0 /100 WBC (ref 0–0.2)
PLATELET # BLD AUTO: 141 10*3/MM3 (ref 140–450)
PMV BLD AUTO: 11.7 FL (ref 6–12)
POTASSIUM SERPL-SCNC: 3.6 MMOL/L (ref 3.5–5.2)
PROT SERPL-MCNC: 5.9 G/DL (ref 6–8.5)
RBC # BLD AUTO: 4.11 10*6/MM3 (ref 4.14–5.8)
SODIUM SERPL-SCNC: 139 MMOL/L (ref 136–145)
WBC NRBC COR # BLD: 6.65 10*3/MM3 (ref 3.4–10.8)

## 2022-01-08 PROCEDURE — 85025 COMPLETE CBC W/AUTO DIFF WBC: CPT | Performed by: FAMILY MEDICINE

## 2022-01-08 PROCEDURE — 25010000002 ENOXAPARIN PER 10 MG: Performed by: NURSE PRACTITIONER

## 2022-01-08 PROCEDURE — 80053 COMPREHEN METABOLIC PANEL: CPT | Performed by: FAMILY MEDICINE

## 2022-01-08 RX ORDER — AMIODARONE HYDROCHLORIDE 200 MG/1
200 TABLET ORAL 2 TIMES DAILY WITH MEALS
Qty: 60 TABLET | Refills: 0 | Status: SHIPPED | OUTPATIENT
Start: 2022-01-08 | End: 2022-01-24 | Stop reason: SDUPTHER

## 2022-01-08 RX ORDER — LORAZEPAM 1 MG/1
1 TABLET ORAL NIGHTLY PRN
Qty: 3 TABLET | Refills: 0 | Status: SHIPPED | OUTPATIENT
Start: 2022-01-08 | End: 2022-01-11

## 2022-01-08 RX ORDER — LISINOPRIL 5 MG/1
5 TABLET ORAL
Qty: 30 TABLET | Refills: 0 | Status: SHIPPED | OUTPATIENT
Start: 2022-01-09 | End: 2022-01-24 | Stop reason: SDUPTHER

## 2022-01-08 RX ORDER — METOPROLOL SUCCINATE 25 MG/1
25 TABLET, EXTENDED RELEASE ORAL
Qty: 30 TABLET | Refills: 0 | Status: SHIPPED | OUTPATIENT
Start: 2022-01-09 | End: 2022-01-24 | Stop reason: SDUPTHER

## 2022-01-08 RX ORDER — FUROSEMIDE 20 MG/1
20 TABLET ORAL 2 TIMES DAILY
Qty: 60 TABLET | Refills: 0 | Status: SHIPPED | OUTPATIENT
Start: 2022-01-08 | End: 2022-01-24 | Stop reason: SDUPTHER

## 2022-01-08 RX ORDER — LORAZEPAM 0.5 MG/1
1 TABLET ORAL NIGHTLY
Status: DISCONTINUED | OUTPATIENT
Start: 2022-01-08 | End: 2022-01-08 | Stop reason: HOSPADM

## 2022-01-08 RX ADMIN — VENLAFAXINE 75 MG: 75 TABLET ORAL at 08:36

## 2022-01-08 RX ADMIN — AMIODARONE HYDROCHLORIDE 200 MG: 200 TABLET ORAL at 18:21

## 2022-01-08 RX ADMIN — FUROSEMIDE 20 MG: 20 TABLET ORAL at 08:36

## 2022-01-08 RX ADMIN — LORAZEPAM 1 MG: 0.5 TABLET ORAL at 01:12

## 2022-01-08 RX ADMIN — SODIUM CHLORIDE, PRESERVATIVE FREE 10 ML: 5 INJECTION INTRAVENOUS at 08:36

## 2022-01-08 RX ADMIN — FUROSEMIDE 20 MG: 20 TABLET ORAL at 18:20

## 2022-01-08 RX ADMIN — LISINOPRIL 5 MG: 5 TABLET ORAL at 08:36

## 2022-01-08 RX ADMIN — ENOXAPARIN SODIUM 90 MG: 100 INJECTION SUBCUTANEOUS at 08:36

## 2022-01-08 RX ADMIN — Medication 400 MG: at 18:20

## 2022-01-08 RX ADMIN — APIXABAN 5 MG: 5 TABLET, FILM COATED ORAL at 18:20

## 2022-01-08 RX ADMIN — Medication 400 MG: at 08:36

## 2022-01-08 RX ADMIN — PANTOPRAZOLE SODIUM 40 MG: 40 INJECTION, POWDER, FOR SOLUTION INTRAVENOUS at 05:29

## 2022-01-08 RX ADMIN — METOPROLOL SUCCINATE 25 MG: 25 TABLET, FILM COATED, EXTENDED RELEASE ORAL at 08:36

## 2022-01-08 RX ADMIN — AMIODARONE HYDROCHLORIDE 200 MG: 200 TABLET ORAL at 08:36

## 2022-01-08 NOTE — PLAN OF CARE
Goal Outcome Evaluation:  Plan of Care Reviewed With: patient, daughter        Progress: no change  Outcome Summary: Patient in Afib this shift, rate controlled under 100, asymptomatic; Very restless and anxious this shift asking for medication to help sleep, Patient given ativan. Rested off and on, otherwise walking around room or unit without difficulty; Awaiting lift vest fitting this morning; VSS Will continue to monitor.

## 2022-01-08 NOTE — PLAN OF CARE
Goal Outcome Evaluation:  Plan of Care Reviewed With: patient, daughter        Progress: improving  Outcome Summary: LifeVest fitted today. VSS, UOP adequate. No complaints at this time. Awaiting MD for possible discharge.

## 2022-01-08 NOTE — PLAN OF CARE
Goal Outcome Evaluation:  Plan of Care Reviewed With: patient, daughter        Progress: improving  Outcome Summary: Patient currently resting comfortably with no complaints. VSS. Awaiting LifeVest. Transfer orders in place; awaiting bed.

## 2022-01-08 NOTE — PROGRESS NOTES
[facility]  INPATIENT PROGRESS NOTE    Length of Stay: 8  Date of Admission: 12/31/2021  Primary Care Physician: Estella Gutierrez MD    Subjective   Chief Complaint: Palpitations/dyspnea  HPI: Doing well, denies any current concerns and is wondering when he can go home.    Review of Systems   Constitutional: Negative for chills and fever.   HENT: Negative for congestion.    Respiratory: Negative for cough, shortness of breath and wheezing.    Cardiovascular: Negative for chest pain and palpitations.   Gastrointestinal: Negative for abdominal pain, constipation, diarrhea and nausea.   Genitourinary: Negative.    Musculoskeletal: Negative.    Neurological: Negative.    Psychiatric/Behavioral: Negative.    All other systems reviewed and are negative.     All pertinent negatives and positives are as above. All other systems have been reviewed and are negative unless otherwise stated.     Objective    Temp:  [96.8 °F (36 °C)-98.8 °F (37.1 °C)] 96.8 °F (36 °C)  Heart Rate:  [73-80] 78  Resp:  [16] 16  BP: (113-142)/(75-97) 142/97    Physical Exam  Constitutional:       General: He is not in acute distress.     Appearance: He is not toxic-appearing.   HENT:      Head: Normocephalic and atraumatic.      Right Ear: External ear normal.      Left Ear: External ear normal.      Nose: Nose normal.      Mouth/Throat:      Mouth: Mucous membranes are moist.      Pharynx: Oropharynx is clear.   Eyes:      Conjunctiva/sclera: Conjunctivae normal.   Cardiovascular:      Rate and Rhythm: Normal rate and regular rhythm.      Pulses: Normal pulses.      Heart sounds: Normal heart sounds.   Pulmonary:      Effort: Pulmonary effort is normal. No respiratory distress.      Breath sounds: Normal breath sounds.   Abdominal:      General: Bowel sounds are normal.      Palpations: Abdomen is soft.      Tenderness: There is no abdominal tenderness.   Musculoskeletal:         General:  No swelling.   Skin:     General: Skin is warm and dry.      Capillary Refill: Capillary refill takes less than 2 seconds.   Neurological:      General: No focal deficit present.      Mental Status: He is alert and oriented to person, place, and time. Mental status is at baseline.   Psychiatric:         Behavior: Behavior normal.         Thought Content: Thought content normal.         Results Review:  I have reviewed the labs, radiology results, and diagnostic studies.    Laboratory Data:   Results from last 7 days   Lab Units 01/08/22  0527 01/07/22 0458 01/06/22  0332   SODIUM mmol/L 139 138 138   POTASSIUM mmol/L 3.6 3.9 3.6   CHLORIDE mmol/L 100 100 101   CO2 mmol/L 27.0 26.0 27.0   BUN mg/dL 23 25* 31*   CREATININE mg/dL 1.03 1.10 1.26   GLUCOSE mg/dL 133* 122* 137*   CALCIUM mg/dL 8.9 9.3 9.2   BILIRUBIN mg/dL 1.7* 2.3* 1.8*   ALK PHOS U/L 66 61 61   ALT (SGPT) U/L 268* 358* 454*   AST (SGOT) U/L 107* 202* 339*   ANION GAP mmol/L 12.0 12.0 10.0     Estimated Creatinine Clearance: 90 mL/min (by C-G formula based on SCr of 1.03 mg/dL).  Results from last 7 days   Lab Units 01/02/22  0313   MAGNESIUM mg/dL 1.7         Results from last 7 days   Lab Units 01/08/22  0527 01/07/22 0458 01/06/22  0332 01/05/22  0956 01/04/22  0726   WBC 10*3/mm3 6.65 8.45 8.25 8.41 12.55*   HEMOGLOBIN g/dL 12.7* 13.2 12.9* 13.0 14.1   HEMATOCRIT % 37.3* 38.6 38.4 38.2 42.1   PLATELETS 10*3/mm3 141 131* 132* 139* 177     Results from last 7 days   Lab Units 01/03/22  0612   INR  1.79*       Culture Data:   No results found for: BLOODCX  No results found for: URINECX  No results found for: RESPCX  No results found for: WOUNDCX  No results found for: STOOLCX  No components found for: BODYFLD    Radiology Data:   Imaging Results (Last 24 Hours)     ** No results found for the last 24 hours. **          I have reviewed the patient's current medications.     Assessment/Plan     Principal Problem:    Paroxysmal atrial fibrillation with  rapid ventricular response ([facility])  Active Problems:    Acute on chronic systolic CHF (congestive heart failure) ([facility])    Moderate malnutrition (CMS/[facility])    Hypotension    Plan:  -Appreciate cardiology's assistance, s/p cardioversion on 1/3/2022.  Plan for LifeVest at this time.  Home when cleared by cardiology.  -Continue with amiodarone along with lisinopril and Toprol-XL  -Continue fluid restriction  -Off of dobutamine, continue low dose lasix  -Echo still shows poor EF so plan is now to get a life vest and see about doing heart cath while still hospitalized.   -Xanax available as well for anxiety as this is likely contributing to his shortness of breath.   -Appreciate nephrology's assistance  -DVT prophylaxis with Lovenox pending heart cath.   -CODE STATUS: Full    I confirmed that the patient's Advance Care Plan is present, code status is documented, or surrogate decision maker is listed in the patient's medical record.     Discharge Planning: In process, plan for heart cath while inpatient at this time.     Efrain Arevalo MD

## 2022-01-08 NOTE — CONSULTS
Cardiology Progress Note     LOS: 8 days   Patient Care Team:  Estella Gutierrez MD as PCP - General (Family Medicine)    Subjective:   Chart reviewed. Patient seen and examined.  Mr. Campa is a 61-year-old thin gentleman with a past medical history significant for paroxysmal atrial fibrillation s/p electrophysiology study and radiofrequency ablation for the atrial fibrillation done in [facility] 3 years ago, nonischemic cardiomyopathy with previous coronary angiogram which had not reveal of any evidence of any obstructive epicardial coronary artery disease, benign prostatic hypertrophy, low body mask index, abnormal liver function tests, acute on chronic kidney disease, negative CTA of the chest for pulmonary embolism, episodes of hypotension, anxiety and depression, gastroesophageal reflux disease, bilateral pleural effusion who had presented to [facility] with increasing episodes of shortness of breath on December 31, 2021.  Patient was evaluated by Dr. Mantilla and underwent a transthoracic echocardiogram and electrical cardioversion for the atrial fibrillation to normal sinus rhythm and was subsequently started on Eliquis.  Patient had left ventricular systolic dysfunction with an ejection fraction of 25%.  Patient underwent aggressive diuresis.  Patient was evaluated by nephrology for the chronic kidney disease.  Patient was recommended ischemic evaluation to rule out underlying coronary artery disease prior to the AICD placement evaluation.  Patient had a LifeVest placed.    Patient on questioning denies any symptoms of chest pain.  Patient symptoms of shortness of breath has improved.  Patient denies excessive intake of salt.  Patient does have strong family history for atherosclerotic coronary artery disease.    Patient on questioning denies any episodes of any bleeding diastasis.    Patient was eager to go home and did not want to undergo coronary angiogram.  As the patient had recently  undergone electrical cardioversion and is currently on Eliquis patient would not be a good candidate at the present time to stop anticoagulation prior to the coronary angiogram.  After prolonged discussion with the patient patient was eager to go home and would return outpatient for further ischemic evaluation.  Patient would be discharged home on LifeVest.    Patient has no known drug allergy.    Concurrent medical history:  1.  Shortness of breath.  2.  Paroxysmal atrial fibrillation.  3.  S/p electrical cardioversion.  4.  Radiofrequency ablation with electrophysiology study done 3 years ago for atrial fibrillation in [place].  5.  Nonischemic cardiomyopathy with left ventricular systolic dysfunction with an ejection fraction of 25%.  6.  Previous coronary angiogram which had not reveal of any evidence of any obstructive epicardial coronary artery disease in [place] 4 years ago.  7.  Labile arterial hypertension with episodes of hypotension.  8.  Bilateral pleural effusion.  9.  Anxiety and depression.  10.  Gastroesophageal reflux disease.  11.  Acute  on chronic kidney disease.  12.  Strong family history for coronary artery disease.  13.  CTA of the chest which was negative for pulmonary embolism  14.  Abnormal liver function tests.  15.  Low body mask index  16.  Benign prostatic hypertrophy.  17.  Anemia.    Past surgical history:  1.  Coronary angiogram done 4 years ago in [place].  2.  Electrophysiology study.  3.  Radiofrequency ablation for the atrial fibrillation.  4.  Electrical cardioversion for atrial fibrillation.    Family history: Patient has strong family history for atherosclerotic coronary artery disease.    Social history: Patient denies any tobacco alcohol intake.    Cardiac risk factors:  1.  Male.  2.  Family history for coronary artery disease.  3.  Hyperlipidemia      Objective:  Temp:  [96.8 °F (36 °C)-98.8 °F (37.1 °C)] 96.8 °F (36 °C)  Heart Rate:  [73-80] 78  Resp:  [16]  16  BP: (113-142)/(75-97) 142/97    Intake/Output Summary (Last 24 hours) at 1/8/2022 1323  Last data filed at 1/8/2022 0900  Gross per 24 hour   Intake 440 ml   Output 450 ml   Net -10 ml       Physical Exam:   General Appearance:    Alert, oriented, cooperative, in no acute distress.   Head:    Normocephalic, atraumatic, without obvious abnormality   Eyes:             NALLELY. Lids and lashes normal, conjunctivae and sclerae normal, no icterus, no pallor.   Ears:    Ears appear intact with no abnormalities noted.   Throat:   Mucous membranes pink and moist.   Neck:  Supple, trachea midline, no carotid bruit, no organomegaly or JVD.   Lungs:    Clear to auscultation and percussion.  Respirations regular, even and unlabored. No wheezes, rales, or rhonchi.    Heart:    Regular rhythm and normal rate, normal S1 and S2, no      murmur, no gallop, no rub, no click.   Abdomen:    Soft, nontender, nondistended, no guarding, no rebound tenderness. Normal bowel sounds in all four quadrants, no masses, liver and spleen nonpalpable.    Genitalia:    Deferred.   Extremities:   Moves all extremities well, no edema, no cyanosis, no       redness, no clubbing.   Pulses:   Pulses palpable and equal bilaterally.   Skin:   Moist and warm. No bleeding, bruising or rash.   Neurologic/Psychiatric:   Alert and oriented to person, place, and time.  Motor, power and tone in upper and lower extremities are grossly intact.  No focal neurological deficits. Normal cognitive function. No psychomotor reaction or tangential thought. No depression, homicidal ideations and suicidal ideations.          Results Review:    Results from last 7 days   Lab Units 01/08/22  0527   SODIUM mmol/L 139   POTASSIUM mmol/L 3.6   CHLORIDE mmol/L 100   CO2 mmol/L 27.0   BUN mg/dL 23   CREATININE mg/dL 1.03   CALCIUM mg/dL 8.9   BILIRUBIN mg/dL 1.7*   ALK PHOS U/L 66   ALT (SGPT) U/L 268*   AST (SGOT) U/L 107*   GLUCOSE mg/dL 133*     Results from last 7 days   Lab  Units 01/02/22  1348   TROPONIN T ng/mL <0.010         Results from last 7 days   Lab Units 01/08/22  0527   WBC 10*3/mm3 6.65   HEMOGLOBIN g/dL 12.7*   HEMATOCRIT % 37.3*   PLATELETS 10*3/mm3 141     Results from last 7 days   Lab Units 01/03/22  0612   INR  1.79*     Results from last 7 days   Lab Units 01/02/22  0313   MAGNESIUM mg/dL 1.7                   ECHO:  Results for orders placed during the hospital encounter of 12/31/21    Adult Transthoracic Echo Limited W/ Cont if Necessary Per Protocol    Interpretation Summary  · Calculated left ventricular EF = 28% Left ventricular systolic function is severely decreased.  · There is mild, bileaflet mitral valve thickening present.  · Moderate mitral valve regurgitation is present.  · The right ventricular cavity is mildly dilated.  · The left ventricular cavity is mildly dilated.  · The right atrial cavity is mildly dilated.  · Left ventricular diastolic function was not assessed.      ECG 12 Lead   Final Result   Test Reason : post cv   Blood Pressure :   */*   mmHG   Vent. Rate :  71 BPM     Atrial Rate :  71 BPM      P-R Int : 140 ms          QRS Dur :  94 ms       QT Int : 514 ms       P-R-T Axes :  83 101 191 degrees      QTc Int : 558 ms      Normal sinus rhythm with sinus arrhythmia   Lateral infarct , age undetermined   ST & T wave abnormality, consider inferior ischemia   ST & T wave abnormality, consider anterior ischemia   Prolonged QT   Abnormal ECG   When compared with ECG of 03-JAN-2022 05:46,   Significant changes have occurred      Referred By:            Confirmed By: MARTA BREWER MD      ECG 12 Lead   Final Result   Test Reason : Amiodarone Protocol   Blood Pressure :   */*   mmHG   Vent. Rate : 118 BPM     Atrial Rate : 236 BPM      P-R Int :   * ms          QRS Dur :  80 ms       QT Int : 332 ms       P-R-T Axes :   *  80 -76 degrees      QTc Int : 465 ms      Atrial flutter with 2:1 AV conduction   T wave abnormality, consider inferior  ischemia   Abnormal ECG   When compared with ECG of 02-JAN-2022 06:25,   ST no longer depressed in Anterior leads      Referred By:            Confirmed By: MARTA BREWER MD      ECG 12 Lead   Final Result   Test Reason : AFib   Blood Pressure :   */*   mmHG   Vent. Rate : 122 BPM     Atrial Rate : 244 BPM      P-R Int :   * ms          QRS Dur :  72 ms       QT Int : 416 ms       P-R-T Axes :   *  89 -63 degrees      QTc Int : 592 ms      Atrial flutter with 2:1 AV conduction   ST & T wave abnormality, consider inferolateral ischemia   Abnormal ECG   When compared with ECG of 01-JAN-2022 06:40,   ST now depressed in Anterior leads   Inverted T waves have replaced nonspecific T wave abnormality in Inferior    leads   T wave inversion now evident in Lateral leads      Referred By:            Confirmed By: MARTA BREWER MD      ECG 12 Lead   Final Result   Test Reason : Afib   Blood Pressure :   */*   mmHG   Vent. Rate : 124 BPM     Atrial Rate : 248 BPM      P-R Int :   * ms          QRS Dur :  78 ms       QT Int : 270 ms       P-R-T Axes :  55  88 -48 degrees      QTc Int : 387 ms      Atrial flutter with 2:1 AV conduction   Abnormal QRS-T angle, consider primary T wave abnormality   Abnormal ECG   When compared with ECG of 31-DEC-2021 17:09,   Atrial flutter has replaced Sinus rhythm   T wave inversion no longer evident in Anterolateral leads      Referred By:            Confirmed By: MARTA BREWER MD      ECG 12 Lead   Final Result   Test Reason : chest pain   Blood Pressure :   */*   mmHG   Vent. Rate : 127 BPM     Atrial Rate : 127 BPM      P-R Int : 126 ms          QRS Dur :  82 ms       QT Int : 328 ms       P-R-T Axes :   *  90 263 degrees      QTc Int : 476 ms      Atrial flutter with RVR   Rightward axis   T wave abnormality, consider lateral ischemia   Abnormal ECG   When compared with ECG of 31-DEC-2021 08:31,   Premature supraventricular complexes are no longer Present   ID interval has increased   T  wave inversion now evident in Anterolateral leads      Referred By:            Confirmed By: MARTA BREWER MD      ECG 12 Lead   Final Result   Test Reason : SOA   Blood Pressure :   */*   mmHG   Vent. Rate : 141 BPM     Atrial Rate : 141 BPM      P-R Int :  56 ms          QRS Dur :  84 ms       QT Int : 344 ms       P-R-T Axes :   * 104 -53 degrees      QTc Int : 526 ms      Atrial flutter with 2 to 1 block   Rightward axis   Septal infarct , age undetermined   Abnormal ECG   When compared with ECG of 30-DEC-2021 11:05, Atrial flutter   ST less depressed in Inferior leads   T wave inversion less evident in Inferior leads   Nonspecific T wave abnormality no longer evident in Anterior leads      Referred By:            Confirmed By: MARTA BREWER MD      SCANNED EKG   Final Result      SCANNED EKG   Final Result      SCANNED EKG   Final Result      SCANNED EKG   Final Result      SCANNED EKG   Final Result      SCANNED EKG   Final Result      SCANNED EKG   Final Result      SCANNED EKG   Final Result      SCANNED EKG   Final Result      SCANNED EKG   Final Result      SCANNED EKG   Final Result      SCANNED EKG   Final Result      SCANNED EKG   Final Result      SCANNED EKG   Final Result      SCANNED EKG   Final Result      SCANNED EKG   Final Result      ECG 12 Lead    (Results Pending)   ECG 12 Lead    (Results Pending)   ECG 12 Lead    (Results Pending)   ECG 12 Lead    (Results Pending)   ECG 12 Lead    (Results Pending)        Medication Review:   Current Facility-Administered Medications   Medication Dose Route Frequency Provider Last Rate Last Admin   • ALPRAZolam (XANAX) tablet 0.5 mg  0.5 mg Oral TID PRN Arie Lockhart MD   0.5 mg at 01/07/22 2003   • amiodarone (PACERONE) tablet 200 mg  200 mg Oral BID With Meals Griselda Cummins APRN   200 mg at 01/08/22 0836   • calcium carbonate (TUMS) chewable tablet 500 mg (200 mg elemental)  2 tablet Oral TID PRN Efrain Arevalo MD   2 tablet at 01/06/22  1646   • enoxaparin (LOVENOX) syringe 90 mg  1 mg/kg Subcutaneous Q12H Griselda Cummins APRN   90 mg at 01/08/22 0836   • furosemide (LASIX) tablet 20 mg  20 mg Oral BID Becka Aparicio MD   20 mg at 01/08/22 0836   • lactated ringers bolus 250 mL  250 mL Intravenous Once Arie Lockhart MD   Held at 01/04/22 0739   • lisinopril (PRINIVIL,ZESTRIL) tablet 5 mg  5 mg Oral Q24H Becka Aparicio MD   5 mg at 01/08/22 0836   • LORazepam (ATIVAN) tablet 1 mg  1 mg Oral Nightly Jodi Carbajal MD   1 mg at 01/08/22 0112   • magnesium oxide (MAG-OX) tablet 400 mg  400 mg Oral BID Barron Reeves MD   400 mg at 01/08/22 0836   • Magnesium Sulfate 2 gram Bolus, followed by 8 gram infusion (total Mg dose 10 grams)- Mg less than or equal to 1mg/dL  2 g Intravenous PRN Efrain Arevalo MD        Or   • Magnesium Sulfate 2 gram / 50mL Infusion (GIVE X 3 BAGS TO EQUAL 6GM TOTAL DOSE) - Mg 1.1 - 1.5 mg/dl  2 g Intravenous PRN Efrain Arevalo MD        Or   • Magnesium Sulfate 4 gram infusion- Mg 1.6-1.9 mg/dL  4 g Intravenous PRN Efrain Arevalo MD       • metoprolol succinate XL (TOPROL-XL) 24 hr tablet 25 mg  25 mg Oral Q24H Altagracia Mantilla MD   25 mg at 01/08/22 0836   • ondansetron (ZOFRAN) injection 4 mg  4 mg Intravenous Q6H PRN Celso Méndez MD   4 mg at 01/04/22 0929   • pantoprazole (PROTONIX) injection 40 mg  40 mg Intravenous Q AM Celso Méndez MD   40 mg at 01/08/22 0529   • potassium chloride (KLOR-CON) packet 40 mEq  40 mEq Oral PRN Efrain Arevalo MD       • potassium chloride (MICRO-K) CR capsule 40 mEq  40 mEq Oral PRN Efrain Arevalo MD   40 mEq at 01/03/22 2046   • prochlorperazine (COMPAZINE) injection 10 mg  10 mg Intravenous Q4H PRN Arik Haines MD   10 mg at 01/03/22 2202    Or   • prochlorperazine (COMPAZINE) tablet 5 mg  5 mg Oral Q6H PRN Arik Haines MD        Or   • prochlorperazine (COMPAZINE) suppository 25 mg  25 mg Rectal Q12H PRN Arik Haines MD       • sodium  chloride 0.9 % flush 10 mL  10 mL Intravenous PRN Celso Méndez MD       • sodium chloride 0.9 % flush 10 mL  10 mL Intravenous Q12H Celso Méndez MD   10 mL at 01/08/22 0836   • sodium chloride 0.9 % flush 10 mL  10 mL Intravenous PRN Celso Méndez MD   10 mL at 01/03/22 1805   • tamsulosin (FLOMAX) 24 hr capsule 0.4 mg  0.4 mg Oral Nightly Becka Aparicio MD   0.4 mg at 01/07/22 2003   • venlafaxine (EFFEXOR) tablet 75 mg  75 mg Oral BID Celso Méndez MD   75 mg at 01/08/22 0836       Assessment and Plan:      Paroxysmal atrial fibrillation with rapid ventricular response (HCC)    Acute on chronic systolic CHF (congestive heart failure) (HCC)    Moderate malnutrition (CMS/HCC)    Hypotension  1.  Paroxysmal atrial fibrillation.  Patient has history of atrial fibrillation with previous electrophysiology study and radiofrequency ablation and electrical cardioversion done in [place].  Patient had presented to [facility] with atrial fibrillation.  Patient underwent evaluation by Dr. Mantilla with electrical cardioversion for the atrial fibrillation.  Patient had left ventricular systolic dysfunction with an ejection fraction of 28%.  Patient was initiated on Eliquis and amiodarone.  Patient had a LifeVest in place.  Patient was recommended further evaluation to rule out underlying ischemia and ischemic cardiomyopathy prior to consideration for an AICD placement if the patient left ventricular systolic function does not improve.    2.  Left ventricular systolic dysfunction.  Patient had undergone previous ischemic evaluation which was -4 years ago.  Patient does have strong family history for atherosclerotic coronary artery disease.  Patient has been recommended coronary angiogram to rule out underlying ischemia prior to consideration for AICD placement if the patient left ventricular systolic function does not improve.  Patient is currently on lisinopril and Lasix.  Patient is not on a beta-blocker  secondary to episodes of low blood pressure.  Patient has a LifeVest in place.    3.  Shortness of breath angina equivalent.  Patient has severe left ventricular systolic dysfunction.  Patient has been recommended a coronary angiogram which would be done on outpatient basis.  Risk-benefit treatment option for the coronary angiogram were discussed with the patient and an informed consent was obtained.  Patient Mallampati score #2 patient ASA classification 1 #2.  Patient anticoagulation with Eliquis would be held prior to the coronary angiogram.    4.  Chronic kidney disease.  Patient was evaluated by nephrology for the chronic kidney disease.  Patient's creatinine has increased to 2.6 with subsequent creatinine which had improved.  On discharge from the hospital patient's creatinine was 1.03.    5.  Abnormal liver function test.  Patient SGOT SGPT and total bilirubin was elevated which had also improved with a peak bilirubin of 2.3 which on discharge was 1.7.    6.  Anemia with a hemoglobin of 12.7 as noted.  Patient has not had any hemoptysis hematuria bright of blood per rectum.    7.  Low body mask index is noted.    Patient is eager to go home and would be discharged home and will follow up in the office for coronary angiogram.    Thanks for the consultation.    The above plan of management were discussed with the patient            Electronically signed by Barron Reeves MD, 01/08/22, 1:23 PM CST.      Time: Time spent on face-to-face interaction 20 minutes    Dictated utilizing Dragon dictation.

## 2022-01-09 ENCOUNTER — READMISSION MANAGEMENT (OUTPATIENT)
Dept: CALL CENTER | Facility: HOSPITAL | Age: 62
End: 2022-01-09

## 2022-01-09 NOTE — OUTREACH NOTE
Prep Survey      Responses   [facility] facility patient discharged from? [place]   Is LACE score < 7 ? No   Emergency Room discharge w/ pulse ox? No   Eligibility Readm Mgmt   Discharge diagnosis Paroxysmal atrial fibrillation with rapid ventricular response    Does the patient have one of the following disease processes/diagnoses(primary or secondary)? CHF   Does the patient have Home health ordered? No   Is there a DME ordered? No   Medication alerts for this patient Amiodarone HCL, Eliquis, Lasix    Prep survey completed? Yes          Ingrid Mckinnon RN

## 2022-01-09 NOTE — DISCHARGE SUMMARY
[facility] Medicine Services  DISCHARGE SUMMARY       Date of Admission: 12/31/2021  Date of Discharge:  1/8/2022  Primary Care Physician: Estella Gutierrez MD    Presenting Problem/History of Present Illness:  Peripheral edema [R60.9]  SHI (dyspnea on exertion) [R06.00]  Atrial flutter with rapid ventricular response (HCC) [I48.92]  Acute congestive heart failure, unspecified heart failure type (HCC) [I50.9]     Final Discharge Diagnoses:  Active Hospital Problems    Diagnosis    • **Paroxysmal atrial fibrillation with rapid ventricular response (HCC)    • Hypotension    • Moderate malnutrition (CMS/HCC)    • Acute on chronic systolic CHF (congestive heart failure) (HCC)        Consults:   Consults     Date and Time Order Name Status Description    1/4/2022 11:26 AM Inpatient Nephrology Consult Completed     12/31/2021  4:56 PM Inpatient Cardiology Consult Completed           Procedures Performed:                 Pertinent Test Results:   Lab Results (most recent)     Procedure Component Value Units Date/Time    Comprehensive Metabolic Panel [[id number]]  (Abnormal) Collected: 01/08/22 0527    Specimen: Blood Updated: 01/08/22 0653     Glucose 133 mg/dL      BUN 23 mg/dL      Creatinine 1.03 mg/dL      Sodium 139 mmol/L      Potassium 3.6 mmol/L      Chloride 100 mmol/L      CO2 27.0 mmol/L      Calcium 8.9 mg/dL      Total Protein 5.9 g/dL      Albumin 3.80 g/dL      ALT (SGPT) 268 U/L      AST (SGOT) 107 U/L      Alkaline Phosphatase 66 U/L      Total Bilirubin 1.7 mg/dL      eGFR Non African Amer 73 mL/min/1.73      Globulin 2.1 gm/dL      A/G Ratio 1.8 g/dL      BUN/Creatinine Ratio 22.3     Anion Gap 12.0 mmol/L     Narrative:      GFR Normal >60  Chronic Kidney Disease <60  Kidney Failure <15      CBC & Differential [[id number]]  (Abnormal) Collected: 01/08/22 0527    Specimen: Blood Updated: 01/08/22 0643    Narrative:      The following orders were created for panel  order CBC & Differential.  Procedure                               Abnormality         Status                     ---------                               -----------         ------                     CBC Auto Differential[[id number]]        Abnormal            Final result               Scan Slide[[id number]]                                                                    Please view results for these tests on the individual orders.    CBC Auto Differential [[id number]]  (Abnormal) Collected: 01/08/22 0527    Specimen: Blood Updated: 01/08/22 0643     WBC 6.65 10*3/mm3      RBC 4.11 10*6/mm3      Hemoglobin 12.7 g/dL      Hematocrit 37.3 %      MCV 90.8 fL      MCH 30.9 pg      MCHC 34.0 g/dL      RDW 13.9 %      RDW-SD 46.4 fl      MPV 11.7 fL      Platelets 141 10*3/mm3      Neutrophil % 67.9 %      Lymphocyte % 19.2 %      Monocyte % 10.7 %      Eosinophil % 1.5 %      Basophil % 0.5 %      Immature Grans % 0.2 %      Neutrophils, Absolute 4.52 10*3/mm3      Lymphocytes, Absolute 1.28 10*3/mm3      Monocytes, Absolute 0.71 10*3/mm3      Eosinophils, Absolute 0.10 10*3/mm3      Basophils, Absolute 0.03 10*3/mm3      Immature Grans, Absolute 0.01 10*3/mm3      nRBC 0.0 /100 WBC     Comprehensive Metabolic Panel [[id number]]  (Abnormal) Collected: 01/07/22 0458    Specimen: Blood Updated: 01/07/22 0547     Glucose 122 mg/dL      BUN 25 mg/dL      Creatinine 1.10 mg/dL      Sodium 138 mmol/L      Potassium 3.9 mmol/L      Chloride 100 mmol/L      CO2 26.0 mmol/L      Calcium 9.3 mg/dL      Total Protein 6.0 g/dL      Albumin 3.90 g/dL      ALT (SGPT) 358 U/L      AST (SGOT) 202 U/L      Alkaline Phosphatase 61 U/L      Total Bilirubin 2.3 mg/dL      eGFR Non African Amer 68 mL/min/1.73      Globulin 2.1 gm/dL      A/G Ratio 1.9 g/dL      BUN/Creatinine Ratio 22.7     Anion Gap 12.0 mmol/L     Narrative:      GFR Normal >60  Chronic Kidney Disease <60  Kidney Failure <15      CBC & Differential [[id number]]  (Abnormal)  Collected: 01/07/22 0458    Specimen: Blood Updated: 01/07/22 0532    Narrative:      The following orders were created for panel order CBC & Differential.  Procedure                               Abnormality         Status                     ---------                               -----------         ------                     CBC Auto Differential[[id number]]        Abnormal            Final result               Scan Slide[[id number]]                                                                    Please view results for these tests on the individual orders.    CBC Auto Differential [[id number]]  (Abnormal) Collected: 01/07/22 0458    Specimen: Blood Updated: 01/07/22 0532     WBC 8.45 10*3/mm3      RBC 4.17 10*6/mm3      Hemoglobin 13.2 g/dL      Hematocrit 38.6 %      MCV 92.6 fL      MCH 31.7 pg      MCHC 34.2 g/dL      RDW 13.9 %      RDW-SD 47.2 fl      MPV 11.4 fL      Platelets 131 10*3/mm3      Neutrophil % 72.9 %      Lymphocyte % 14.4 %      Monocyte % 11.4 %      Eosinophil % 0.7 %      Basophil % 0.2 %      Immature Grans % 0.4 %      Neutrophils, Absolute 6.16 10*3/mm3      Lymphocytes, Absolute 1.22 10*3/mm3      Monocytes, Absolute 0.96 10*3/mm3      Eosinophils, Absolute 0.06 10*3/mm3      Basophils, Absolute 0.02 10*3/mm3      Immature Grans, Absolute 0.03 10*3/mm3      nRBC 0.0 /100 WBC     Protein / Creatinine Ratio, Urine - Urine, Clean Catch [[id number]]  (Abnormal) Collected: 01/04/22 1518    Specimen: Urine, Clean Catch Updated: 01/05/22 0406     Protein/Creatinine Ratio, Urine 278.8 mg/G Crea      Creatinine, Urine 132.7 mg/dL      Total Protein, Urine 37.0 mg/dL     Creatinine, Urine, Random - Urine, Clean Catch [[id number]] Collected: 01/04/22 1518    Specimen: Urine, Clean Catch Updated: 01/05/22 0132     Creatinine, Urine 127.9 mg/dL     Narrative:      Reference intervals for random urine have not been established.  Clinical usage is dependent upon physician's interpretation in  combination with other laboratory tests.       Urinalysis, Microscopic Only - Urine, Clean Catch [[id number]]  (Abnormal) Collected: 01/04/22 1546    Specimen: Urine, Clean Catch Updated: 01/04/22 1551     RBC, UA 3-5 /HPF      WBC, UA 6-12 /HPF      Bacteria, UA None Seen /HPF      Squamous Epithelial Cells, UA None Seen /HPF      Hyaline Casts, UA 13-20 /LPF      Methodology Automated Microscopy    Sodium, Urine, Random - Urine, Clean Catch [[id number]] Collected: 01/04/22 1518    Specimen: Urine, Clean Catch Updated: 01/04/22 1547     Sodium, Urine 42 mmol/L     Narrative:      Reference intervals for random urine have not been established.  Clinical usage is dependent upon physician's interpretation in combination with other laboratory tests.       Urinalysis With Microscopic If Indicated (No Culture) - Urine, Clean Catch [[id number]]  (Abnormal) Collected: 01/04/22 1518    Specimen: Urine, Clean Catch Updated: 01/04/22 1547     Color, UA Yellow     Appearance, UA Clear     pH, UA <=5.0     Specific Gravity, UA 1.025     Glucose, UA Negative     Ketones, UA Negative     Bilirubin, UA Small (1+)     Blood, UA Negative     Protein, UA Trace     Leuk Esterase, UA Trace     Nitrite, UA Negative     Urobilinogen, UA 2.0 E.U./dL    CBC (No Diff) [[id number]]  (Abnormal) Collected: 01/04/22 0726    Specimen: Blood Updated: 01/04/22 0734     WBC 12.55 10*3/mm3      RBC 4.49 10*6/mm3      Hemoglobin 14.1 g/dL      Hematocrit 42.1 %      MCV 93.8 fL      MCH 31.4 pg      MCHC 33.5 g/dL      RDW 13.9 %      RDW-SD 47.6 fl      MPV 12.0 fL      Platelets 177 10*3/mm3     Basic Metabolic Panel [[id number]]  (Abnormal) Collected: 01/03/22 0612    Specimen: Blood Updated: 01/03/22 0707     Glucose 103 mg/dL      BUN 30 mg/dL      Creatinine 1.60 mg/dL      Sodium 140 mmol/L      Potassium 3.5 mmol/L      Chloride 101 mmol/L      CO2 26.0 mmol/L      Calcium 8.9 mg/dL      eGFR Non African Amer 44 mL/min/1.73      BUN/Creatinine  Ratio 18.8     Anion Gap 13.0 mmol/L     Narrative:      GFR Normal >60  Chronic Kidney Disease <60  Kidney Failure <15      Protime-INR [[id number]]  (Abnormal) Collected: 01/03/22 0612    Specimen: Blood Updated: 01/03/22 0705     Protime 20.4 Seconds      INR 1.79    Narrative:      Therapeutic range for most indications is 2.0-3.0 INR,  or 2.5-3.5 for mechanical heart valves.    Basic Metabolic Panel [[id number]]  (Abnormal) Collected: 01/02/22 1348    Specimen: Blood Updated: 01/02/22 1417     Glucose 202 mg/dL      BUN 29 mg/dL      Creatinine 1.84 mg/dL      Sodium 139 mmol/L      Potassium 3.7 mmol/L      Comment: Slight hemolysis detected by analyzer. Results may be affected.        Chloride 101 mmol/L      CO2 22.0 mmol/L      Calcium 9.1 mg/dL      eGFR Non African Amer 37 mL/min/1.73      BUN/Creatinine Ratio 15.8     Anion Gap 16.0 mmol/L     Narrative:      GFR Normal >60  Chronic Kidney Disease <60  Kidney Failure <15      Troponin [[id number]]  (Normal) Collected: 01/02/22 1348    Specimen: Blood Updated: 01/02/22 1417     Troponin T <0.010 ng/mL     Narrative:      Troponin T Reference Range:  <= 0.03 ng/mL-   Negative for AMI  >0.03 ng/mL-     Abnormal for myocardial necrosis.  Clinicians would have to utilize clinical acumen, EKG, Troponin and serial changes to determine if it is an Acute Myocardial Infarction or myocardial injury due to an underlying chronic condition.       Results may be falsely decreased if patient taking Biotin.      CBC (No Diff) [[id number]]  (Normal) Collected: 01/02/22 1348    Specimen: Blood Updated: 01/02/22 1358     WBC 10.04 10*3/mm3      RBC 4.94 10*6/mm3      Hemoglobin 15.1 g/dL      Hematocrit 47.8 %      MCV 96.8 fL      MCH 30.6 pg      MCHC 31.6 g/dL      RDW 14.5 %      RDW-SD 50.7 fl      MPV 11.4 fL      Platelets 206 10*3/mm3     Extra Tubes [[id number]] Collected: 01/02/22 0334    Specimen: Blood, Venous Line Updated: 01/02/22 6457    Narrative:      The  following orders were created for panel order Extra Tubes.  Procedure                               Abnormality         Status                     ---------                               -----------         ------                     Lavender Top[[id number]]                                     Final result                 Please view results for these tests on the individual orders.    Lavender Top [[id number]] Collected: 01/02/22 0334    Specimen: Blood Updated: 01/02/22 0445     Extra Tube hold for add-on     Comment: Auto resulted       Magnesium [[id number]]  (Normal) Collected: 01/02/22 0313    Specimen: Blood Updated: 01/02/22 0440     Magnesium 1.7 mg/dL     Troponin [[id number]]  (Normal) Collected: 12/31/21 1739    Specimen: Blood Updated: 12/31/21 1847     Troponin T <0.010 ng/mL     Narrative:      Troponin T Reference Range:  <= 0.03 ng/mL-   Negative for AMI  >0.03 ng/mL-     Abnormal for myocardial necrosis.  Clinicians would have to utilize clinical acumen, EKG, Troponin and serial changes to determine if it is an Acute Myocardial Infarction or myocardial injury due to an underlying chronic condition.       Results may be falsely decreased if patient taking Biotin.      D-dimer, Quantitative [[id number]]  (Abnormal) Collected: 12/31/21 1739    Specimen: Blood Updated: 12/31/21 1832     D-Dimer, Quantitative 740 ng/mL (FEU)     Narrative:      Dimer values <500 ng/ml FEU are FDA approved as aid in diagnosis of deep venous thrombosis and pulmonary embolism.  This test should not be used in an exclusion strategy with pretest probability alone.    A recent guideline regarding diagnosis for pulmonary thromboembolism recommends an adjusted exclusion criterion of age x 10 ng/ml FEU for patients >50 years of age (Marci Intern Med 2015; 163: 701-711).      [place] Draw [[id number]] Collected: 12/31/21 0849    Specimen: Blood Updated: 12/31/21 1000    Narrative:      The following orders were created for panel order  [place] Draw.  Procedure                               Abnormality         Status                     ---------                               -----------         ------                     Green Top (Gel)[[id number]]                                  Final result               Lavender Top[[id number]]                                     Final result               Gold Top - SST[[id number]]                                                              Light Blue Top[[id number]]                                   Final result                 Please view results for these tests on the individual orders.    Lavender Top [[id number]] Collected: 12/31/21 0849    Specimen: Blood Updated: 12/31/21 1000     Extra Tube hold for add-on     Comment: Auto resulted       Light Blue Top [[id number]] Collected: 12/31/21 0849    Specimen: Blood Updated: 12/31/21 1000     Extra Tube hold for add-on     Comment: Auto resulted       Green Top (Gel) [[id number]] Collected: 12/31/21 0849    Specimen: Blood Updated: 12/31/21 1000     Extra Tube Hold for add-ons.     Comment: Auto resulted.       BNP [[id number]]  (Abnormal) Collected: 12/31/21 0849    Specimen: Blood Updated: 12/31/21 0914     proBNP 6,616.0 pg/mL     Narrative:      Among patients with dyspnea, NT-proBNP is highly sensitive for the detection of acute congestive heart failure. In addition NT-proBNP of <300 pg/ml effectively rules out acute congestive heart failure with 99% negative predictive value.    Results may be falsely decreased if patient taking Biotin.          Imaging Results (Most Recent)     Procedure Component Value Units Date/Time    US Renal Bilateral [[id number]] Collected: 01/04/22 1600     Updated: 01/04/22 1651    Narrative:      Ultrasound renal complete    HISTORY:  Worsening renal function. Edema.    Ultrasound examination of the kidneys and urinary bladder was  performed.    COMPARISON: None.     FINDINGS:  The right kidney measures 11.2 cm in length by 5.10 cm  x 4.78 cm  transverse.  The left kidney measures 10.4 cm in length by 5.00 cm x 4.34 cm  transverse.  The kidneys are of normal echotexture.  No hydronephrosis.  No solid or cystic renal mass.    Images of the urinary bladder are unremarkable.    Minimal ascites.  Left pleural effusion.      Impression:      CONCLUSION:  Normal kidneys.  Minimal ascites.  Left pleural effusion.    [id number]    Electronically signed by:  Lester Blackburn MD  1/4/2022 4:50 PM CST  Workstation: [facility] Chest 1 View [[id number]] Collected: 12/31/21 0828     Updated: 12/31/21 0932    Narrative:        PROCEDURE: Single chest view portable    REASON FOR EXAM:Chest pain protocol  chest pain protocol    FINDINGS: Comparison exam dated December 30, 2021. Cardiac and  pulmonary vasculature are normal. Left upper lobe partially  calcified granuloma is again noted as seen on recent CT chest  exam of December 30, 2021. Lungs are otherwise clear. Pleural  spaces are normal. No acute osseous abnormality.      Impression:      1.  Left upper lobe partially calcified granuloma is again noted  as seen on recent CT chest exam of December 30, 2021. This would  be consistent with old granulomatous disease.  2.  Otherwise unremarkable chest.    Electronically signed by:  Vikas Pitt MD  12/31/2021 9:31 AM CST  Workstation: [id number]          Chief Complaint on Day of Discharge: No complaints    Hospital Course:  The patient is a 61 y.o. male with medical history notable for A. fib, COPD, chronic systolic heart failure, and GERD who presented to [facility] with worsening shortness of breath and palpitations.  Patient was admitted for acute on chronic systolic heart failure and A. fib with RVR.  Patient was seen by cardiology and underwent cardioversion was successful conversion to sinus rhythm.  His blood pressures were initially low which hinder diuresis and he was started on dobutamine.  Patient was able to diurese well and felt  "good improvement.  He was able to be tolerated on oral lisinopril, Toprol-XL, and Lasix.  Patient's EF was still noted to be low despite ongoing treatment management and he was provided with a LifeVest.  Patient was otherwise noted to be in stable condition and cleared by cardiology for discharge.  He will follow-up with cardiology as an outpatient and return for a cath in the future to evaluate further.  Patient will be discharged home in stable condition.  Patient voiced understanding agreement.      Condition on Discharge: Stable    Physical Exam on Discharge:  /97 (BP Location: Right arm, Patient Position: Lying)   Pulse 73   Temp 96.8 °F (36 °C) (Temporal)   Resp 16   Ht 190.5 cm (75\")   Wt 86.7 kg (191 lb 2.2 oz)   SpO2 92%   BMI 23.89 kg/m²   Physical Exam  Constitutional:       General: He is not in acute distress.     Appearance: He is not toxic-appearing.   HENT:      Head: Normocephalic and atraumatic.      Right Ear: External ear normal.      Left Ear: External ear normal.      Nose: Nose normal.      Mouth/Throat:      Mouth: Mucous membranes are moist.      Pharynx: Oropharynx is clear.   Eyes:      Conjunctiva/sclera: Conjunctivae normal.   Cardiovascular:      Rate and Rhythm: Normal rate and regular rhythm.      Pulses: Normal pulses.      Heart sounds: Normal heart sounds.   Pulmonary:      Effort: Pulmonary effort is normal. No respiratory distress.      Breath sounds: Normal breath sounds.   Abdominal:      General: Bowel sounds are normal.      Palpations: Abdomen is soft.      Tenderness: There is no abdominal tenderness.   Musculoskeletal:         General: No swelling.      Cervical back: Neck supple.   Skin:     General: Skin is warm and dry.      Capillary Refill: Capillary refill takes less than 2 seconds.   Neurological:      General: No focal deficit present.      Mental Status: He is alert and oriented to person, place, and time. Mental status is at baseline.   Psychiatric:  "        Behavior: Behavior normal.         Thought Content: Thought content normal.         Discharge Disposition:  Home or Self Care    Discharge Medications:     Discharge Medications      New Medications      Instructions Start Date   amiodarone 200 MG tablet  Commonly known as: PACERONE   200 mg, Oral, 2 Times Daily With Meals      apixaban 5 MG tablet tablet  Commonly known as: ELIQUIS   5 mg, Oral, Every 12 Hours Scheduled      furosemide 20 MG tablet  Commonly known as: LASIX   20 mg, Oral, 2 Times Daily      lisinopril 5 MG tablet  Commonly known as: PRINIVIL,ZESTRIL   5 mg, Oral, Every 24 Hours Scheduled   Start Date: January 9, 2022     LORazepam 1 MG tablet  Commonly known as: ATIVAN   1 mg, Oral, Nightly PRN      metoprolol succinate XL 25 MG 24 hr tablet  Commonly known as: TOPROL-XL   25 mg, Oral, Every 24 Hours Scheduled   Start Date: January 9, 2022        Continue These Medications      Instructions Start Date   aspirin 81 MG chewable tablet   325 mg, Oral, Daily      omeprazole 40 MG capsule  Commonly known as: priLOSEC   40 mg, Oral, Daily      tamsulosin 0.4 MG capsule 24 hr capsule  Commonly known as: FLOMAX   1 capsule, Oral, Daily      venlafaxine 75 MG tablet  Commonly known as: EFFEXOR   75 mg, Oral, 2 Times Daily         Stop These Medications    dilTIAZem  MG 24 hr capsule  Commonly known as: DILACOR XR            Discharge Diet:   Diet Instructions     Diet: Regular, Cardiac      Discharge Diet:  Regular  Cardiac       Fluid Restriction per day: 1000 mL Fluid          Activity at Discharge:   Activity Instructions     Gradually Increase Activity Until at Pre-Hospitalization Level            Discharge Care Plan/Instructions: Take medications as prescribed, follow-up with PCP, follow-up with cardiology, return for worsening symptoms.    Follow-up Appointments:   No future appointments.    Test Results Pending at Discharge: None    The patient has current or prior documentation of LVEF  less than 40%, or moderate to severely depressed left ventricular systolic function. The patient was prescribed or already taking a beta-blocker.      The patient has current or prior documentation of LVEF less than 40%, or moderate to severely depressed left ventricular systolic function. The patent was prescribed or already taking an ACE inhibitor or ARB.     Efrain Arevalo MD    Time: 32 minutes

## 2022-01-10 NOTE — PAYOR COMM NOTE
"Michelle Fairchild  Case Management Extender  [phone] phone  [phone] fax    Ref# [id number]    Annamarie Campan (61 y.o. Male)             Date of Birth Social Security Number Address Home Phone MRN    [id number]  [address] [facility] [address] [phone] [id number]    Rastafarian Marital Status             Syn[employer]        Admission Date Admission Type Admitting Provider Attending Provider Department, Room/Bed    12/31/21 Emergency Celso Méndez MD  [facility] CRITICAL CARE STEPDOWN, 15/A    Discharge Date Discharge Disposition Discharge Destination          1/8/2022 Home or Self Care              Attending Provider: (none)   Allergies: No Known Allergies    Isolation: None   Infection: None   Code Status: Prior   Advance Care Planning Activity    Ht: 190.5 cm (75\")   Wt: 86.7 kg (191 lb 2.2 oz)    Admission Cmt: None   Principal Problem: Paroxysmal atrial fibrillation with rapid ventricular response (HCC) [I48.0]                 Active Insurance as of 12/31/2021     Primary Coverage     Payor Plan Insurance Group Employer/Plan Group    AETNA MEDICARE REPLACEMENT AETNA MEDICARE REPLACEMENT [id number]     Payor Plan Address Payor Plan Phone Number Payor Plan Fax Number Effective Dates    [address] [phone]  9/1/2021 - 12/31/2021    [place] [id number]       Subscriber Name Subscriber Birth Date Member ID       ANNAMARIE CAMPA 1960 [id number]                 Emergency Contacts      (Rel.) Home Phone Work Phone Mobile Phone    Val Doshi (Daughter) [phone] -- [phone]    Jd Campa (Son) -- -- [phone]    lissy leyva (Sister) -- -- [phone]               Discharge Summary      Efrain Arevalo MD at 01/08/22 1804              [facility]  DISCHARGE SUMMARY       Date of Admission: 12/31/2021  Date of Discharge:  1/8/2022  Primary Care Physician: " Estella Gutierrez MD    Presenting Problem/History of Present Illness:  Peripheral edema [R60.9]  SHI (dyspnea on exertion) [R06.00]  Atrial flutter with rapid ventricular response (HCC) [I48.92]  Acute congestive heart failure, unspecified heart failure type (HCC) [I50.9]     Final Discharge Diagnoses:  Active Hospital Problems    Diagnosis    • **Paroxysmal atrial fibrillation with rapid ventricular response (HCC)    • Hypotension    • Moderate malnutrition (CMS/HCC)    • Acute on chronic systolic CHF (congestive heart failure) (HCC)        Consults:   Consults     Date and Time Order Name Status Description    1/4/2022 11:26 AM Inpatient Nephrology Consult Completed     12/31/2021  4:56 PM Inpatient Cardiology Consult Completed           Procedures Performed:                 Pertinent Test Results:   Lab Results (most recent)     Procedure Component Value Units Date/Time    Comprehensive Metabolic Panel [[id number]]  (Abnormal) Collected: 01/08/22 0527    Specimen: Blood Updated: 01/08/22 0653     Glucose 133 mg/dL      BUN 23 mg/dL      Creatinine 1.03 mg/dL      Sodium 139 mmol/L      Potassium 3.6 mmol/L      Chloride 100 mmol/L      CO2 27.0 mmol/L      Calcium 8.9 mg/dL      Total Protein 5.9 g/dL      Albumin 3.80 g/dL      ALT (SGPT) 268 U/L      AST (SGOT) 107 U/L      Alkaline Phosphatase 66 U/L      Total Bilirubin 1.7 mg/dL      eGFR Non African Amer 73 mL/min/1.73      Globulin 2.1 gm/dL      A/G Ratio 1.8 g/dL      BUN/Creatinine Ratio 22.3     Anion Gap 12.0 mmol/L     Narrative:      GFR Normal >60  Chronic Kidney Disease <60  Kidney Failure <15      CBC & Differential [[id number]]  (Abnormal) Collected: 01/08/22 0527    Specimen: Blood Updated: 01/08/22 0643    Narrative:      The following orders were created for panel order CBC & Differential.  Procedure                               Abnormality         Status                     ---------                               -----------         ------                      CBC Auto Differential[[id number]]        Abnormal            Final result               Scan Slide[[id number]]                                                                    Please view results for these tests on the individual orders.    CBC Auto Differential [[id number]]  (Abnormal) Collected: 01/08/22 0527    Specimen: Blood Updated: 01/08/22 0643     WBC 6.65 10*3/mm3      RBC 4.11 10*6/mm3      Hemoglobin 12.7 g/dL      Hematocrit 37.3 %      MCV 90.8 fL      MCH 30.9 pg      MCHC 34.0 g/dL      RDW 13.9 %      RDW-SD 46.4 fl      MPV 11.7 fL      Platelets 141 10*3/mm3      Neutrophil % 67.9 %      Lymphocyte % 19.2 %      Monocyte % 10.7 %      Eosinophil % 1.5 %      Basophil % 0.5 %      Immature Grans % 0.2 %      Neutrophils, Absolute 4.52 10*3/mm3      Lymphocytes, Absolute 1.28 10*3/mm3      Monocytes, Absolute 0.71 10*3/mm3      Eosinophils, Absolute 0.10 10*3/mm3      Basophils, Absolute 0.03 10*3/mm3      Immature Grans, Absolute 0.01 10*3/mm3      nRBC 0.0 /100 WBC     Comprehensive Metabolic Panel [[id number]]  (Abnormal) Collected: 01/07/22 0458    Specimen: Blood Updated: 01/07/22 0547     Glucose 122 mg/dL      BUN 25 mg/dL      Creatinine 1.10 mg/dL      Sodium 138 mmol/L      Potassium 3.9 mmol/L      Chloride 100 mmol/L      CO2 26.0 mmol/L      Calcium 9.3 mg/dL      Total Protein 6.0 g/dL      Albumin 3.90 g/dL      ALT (SGPT) 358 U/L      AST (SGOT) 202 U/L      Alkaline Phosphatase 61 U/L      Total Bilirubin 2.3 mg/dL      eGFR Non African Amer 68 mL/min/1.73      Globulin 2.1 gm/dL      A/G Ratio 1.9 g/dL      BUN/Creatinine Ratio 22.7     Anion Gap 12.0 mmol/L     Narrative:      GFR Normal >60  Chronic Kidney Disease <60  Kidney Failure <15      CBC & Differential [[id number]]  (Abnormal) Collected: 01/07/22 0458    Specimen: Blood Updated: 01/07/22 0532    Narrative:      The following orders were created for panel order CBC & Differential.  Procedure                                Abnormality         Status                     ---------                               -----------         ------                     CBC Auto Differential[[id number]]        Abnormal            Final result               Scan Slide[[id number]]                                                                    Please view results for these tests on the individual orders.    CBC Auto Differential [[id number]]  (Abnormal) Collected: 01/07/22 0458    Specimen: Blood Updated: 01/07/22 0532     WBC 8.45 10*3/mm3      RBC 4.17 10*6/mm3      Hemoglobin 13.2 g/dL      Hematocrit 38.6 %      MCV 92.6 fL      MCH 31.7 pg      MCHC 34.2 g/dL      RDW 13.9 %      RDW-SD 47.2 fl      MPV 11.4 fL      Platelets 131 10*3/mm3      Neutrophil % 72.9 %      Lymphocyte % 14.4 %      Monocyte % 11.4 %      Eosinophil % 0.7 %      Basophil % 0.2 %      Immature Grans % 0.4 %      Neutrophils, Absolute 6.16 10*3/mm3      Lymphocytes, Absolute 1.22 10*3/mm3      Monocytes, Absolute 0.96 10*3/mm3      Eosinophils, Absolute 0.06 10*3/mm3      Basophils, Absolute 0.02 10*3/mm3      Immature Grans, Absolute 0.03 10*3/mm3      nRBC 0.0 /100 WBC     Protein / Creatinine Ratio, Urine - Urine, Clean Catch [[id number]]  (Abnormal) Collected: 01/04/22 1518    Specimen: Urine, Clean Catch Updated: 01/05/22 0406     Protein/Creatinine Ratio, Urine 278.8 mg/G Crea      Creatinine, Urine 132.7 mg/dL      Total Protein, Urine 37.0 mg/dL     Creatinine, Urine, Random - Urine, Clean Catch [[id number]] Collected: 01/04/22 1518    Specimen: Urine, Clean Catch Updated: 01/05/22 0132     Creatinine, Urine 127.9 mg/dL     Narrative:      Reference intervals for random urine have not been established.  Clinical usage is dependent upon physician's interpretation in combination with other laboratory tests.       Urinalysis, Microscopic Only - Urine, Clean Catch [[id number]]  (Abnormal) Collected: 01/04/22 1546    Specimen: Urine, Clean Catch Updated:  01/04/22 1551     RBC, UA 3-5 /HPF      WBC, UA 6-12 /HPF      Bacteria, UA None Seen /HPF      Squamous Epithelial Cells, UA None Seen /HPF      Hyaline Casts, UA 13-20 /LPF      Methodology Automated Microscopy    Sodium, Urine, Random - Urine, Clean Catch [[id number]] Collected: 01/04/22 1518    Specimen: Urine, Clean Catch Updated: 01/04/22 1547     Sodium, Urine 42 mmol/L     Narrative:      Reference intervals for random urine have not been established.  Clinical usage is dependent upon physician's interpretation in combination with other laboratory tests.       Urinalysis With Microscopic If Indicated (No Culture) - Urine, Clean Catch [[id number]]  (Abnormal) Collected: 01/04/22 1518    Specimen: Urine, Clean Catch Updated: 01/04/22 1547     Color, UA Yellow     Appearance, UA Clear     pH, UA <=5.0     Specific Gravity, UA 1.025     Glucose, UA Negative     Ketones, UA Negative     Bilirubin, UA Small (1+)     Blood, UA Negative     Protein, UA Trace     Leuk Esterase, UA Trace     Nitrite, UA Negative     Urobilinogen, UA 2.0 E.U./dL    CBC (No Diff) [[id number]]  (Abnormal) Collected: 01/04/22 0726    Specimen: Blood Updated: 01/04/22 0734     WBC 12.55 10*3/mm3      RBC 4.49 10*6/mm3      Hemoglobin 14.1 g/dL      Hematocrit 42.1 %      MCV 93.8 fL      MCH 31.4 pg      MCHC 33.5 g/dL      RDW 13.9 %      RDW-SD 47.6 fl      MPV 12.0 fL      Platelets 177 10*3/mm3     Basic Metabolic Panel [[id number]]  (Abnormal) Collected: 01/03/22 0612    Specimen: Blood Updated: 01/03/22 0707     Glucose 103 mg/dL      BUN 30 mg/dL      Creatinine 1.60 mg/dL      Sodium 140 mmol/L      Potassium 3.5 mmol/L      Chloride 101 mmol/L      CO2 26.0 mmol/L      Calcium 8.9 mg/dL      eGFR Non African Amer 44 mL/min/1.73      BUN/Creatinine Ratio 18.8     Anion Gap 13.0 mmol/L     Narrative:      GFR Normal >60  Chronic Kidney Disease <60  Kidney Failure <15      Protime-INR [[id number]]  (Abnormal) Collected: 01/03/22 0612     Specimen: Blood Updated: 01/03/22 0705     Protime 20.4 Seconds      INR 1.79    Narrative:      Therapeutic range for most indications is 2.0-3.0 INR,  or 2.5-3.5 for mechanical heart valves.    Basic Metabolic Panel [[id number]]  (Abnormal) Collected: 01/02/22 1348    Specimen: Blood Updated: 01/02/22 1417     Glucose 202 mg/dL      BUN 29 mg/dL      Creatinine 1.84 mg/dL      Sodium 139 mmol/L      Potassium 3.7 mmol/L      Comment: Slight hemolysis detected by analyzer. Results may be affected.        Chloride 101 mmol/L      CO2 22.0 mmol/L      Calcium 9.1 mg/dL      eGFR Non African Amer 37 mL/min/1.73      BUN/Creatinine Ratio 15.8     Anion Gap 16.0 mmol/L     Narrative:      GFR Normal >60  Chronic Kidney Disease <60  Kidney Failure <15      Troponin [[id number]]  (Normal) Collected: 01/02/22 1348    Specimen: Blood Updated: 01/02/22 1417     Troponin T <0.010 ng/mL     Narrative:      Troponin T Reference Range:  <= 0.03 ng/mL-   Negative for AMI  >0.03 ng/mL-     Abnormal for myocardial necrosis.  Clinicians would have to utilize clinical acumen, EKG, Troponin and serial changes to determine if it is an Acute Myocardial Infarction or myocardial injury due to an underlying chronic condition.       Results may be falsely decreased if patient taking Biotin.      CBC (No Diff) [[id number]]  (Normal) Collected: 01/02/22 1348    Specimen: Blood Updated: 01/02/22 1358     WBC 10.04 10*3/mm3      RBC 4.94 10*6/mm3      Hemoglobin 15.1 g/dL      Hematocrit 47.8 %      MCV 96.8 fL      MCH 30.6 pg      MCHC 31.6 g/dL      RDW 14.5 %      RDW-SD 50.7 fl      MPV 11.4 fL      Platelets 206 10*3/mm3     Extra Tubes [[id number]] Collected: 01/02/22 0334    Specimen: Blood, Venous Line Updated: 01/02/22 3611    Narrative:      The following orders were created for panel order Extra Tubes.  Procedure                               Abnormality         Status                     ---------                                -----------         ------                     Lavender Top[[id number]]                                     Final result                 Please view results for these tests on the individual orders.    Lavender Top [[id number]] Collected: 01/02/22 0334    Specimen: Blood Updated: 01/02/22 0445     Extra Tube hold for add-on     Comment: Auto resulted       Magnesium [[id number]]  (Normal) Collected: 01/02/22 0313    Specimen: Blood Updated: 01/02/22 0440     Magnesium 1.7 mg/dL     Troponin [[id number]]  (Normal) Collected: 12/31/21 1739    Specimen: Blood Updated: 12/31/21 1847     Troponin T <0.010 ng/mL     Narrative:      Troponin T Reference Range:  <= 0.03 ng/mL-   Negative for AMI  >0.03 ng/mL-     Abnormal for myocardial necrosis.  Clinicians would have to utilize clinical acumen, EKG, Troponin and serial changes to determine if it is an Acute Myocardial Infarction or myocardial injury due to an underlying chronic condition.       Results may be falsely decreased if patient taking Biotin.      D-dimer, Quantitative [[id number]]  (Abnormal) Collected: 12/31/21 1739    Specimen: Blood Updated: 12/31/21 1832     D-Dimer, Quantitative 740 ng/mL (FEU)     Narrative:      Dimer values <500 ng/ml FEU are FDA approved as aid in diagnosis of deep venous thrombosis and pulmonary embolism.  This test should not be used in an exclusion strategy with pretest probability alone.    A recent guideline regarding diagnosis for pulmonary thromboembolism recommends an adjusted exclusion criterion of age x 10 ng/ml FEU for patients >50 years of age (Marci Intern Med 2015; 163: 701-711).      Sibley Draw [[id number]] Collected: 12/31/21 0849    Specimen: Blood Updated: 12/31/21 1000    Narrative:      The following orders were created for panel order Sibley Draw.  Procedure                               Abnormality         Status                     ---------                               -----------         ------                      Green Top (Gel)[[id number]]                                  Final result               Lavender Top[[id number]]                                     Final result               Gold Top - SST[[id number]]                                                              Light Blue Top[[id number]]                                   Final result                 Please view results for these tests on the individual orders.    Lavender Top [[id number]] Collected: 12/31/21 0849    Specimen: Blood Updated: 12/31/21 1000     Extra Tube hold for add-on     Comment: Auto resulted       Light Blue Top [[id number]] Collected: 12/31/21 0849    Specimen: Blood Updated: 12/31/21 1000     Extra Tube hold for add-on     Comment: Auto resulted       Green Top (Gel) [[id number]] Collected: 12/31/21 0849    Specimen: Blood Updated: 12/31/21 1000     Extra Tube Hold for add-ons.     Comment: Auto resulted.       BNP [[id number]]  (Abnormal) Collected: 12/31/21 0849    Specimen: Blood Updated: 12/31/21 0914     proBNP 6,616.0 pg/mL     Narrative:      Among patients with dyspnea, NT-proBNP is highly sensitive for the detection of acute congestive heart failure. In addition NT-proBNP of <300 pg/ml effectively rules out acute congestive heart failure with 99% negative predictive value.    Results may be falsely decreased if patient taking Biotin.          Imaging Results (Most Recent)     Procedure Component Value Units Date/Time    US Renal Bilateral [[id number]] Collected: 01/04/22 1600     Updated: 01/04/22 1651    Narrative:      Ultrasound renal complete    HISTORY:  Worsening renal function. Edema.    Ultrasound examination of the kidneys and urinary bladder was  performed.    COMPARISON: None.     FINDINGS:  The right kidney measures 11.2 cm in length by 5.10 cm x 4.78 cm  transverse.  The left kidney measures 10.4 cm in length by 5.00 cm x 4.34 cm  transverse.  The kidneys are of normal echotexture.  No hydronephrosis.  No solid or cystic renal  mass.    Images of the urinary bladder are unremarkable.    Minimal ascites.  Left pleural effusion.      Impression:      CONCLUSION:  Normal kidneys.  Minimal ascites.  Left pleural effusion.    [id number]    Electronically signed by:  Lester Blackburn MD  1/4/2022 4:50 PM CST  Workstation: [id number]    XR Chest 1 View [[id number]] Collected: 12/31/21 0828     Updated: 12/31/21 0932    Narrative:        PROCEDURE: Single chest view portable    REASON FOR EXAM:Chest pain protocol  chest pain protocol    FINDINGS: Comparison exam dated December 30, 2021. Cardiac and  pulmonary vasculature are normal. Left upper lobe partially  calcified granuloma is again noted as seen on recent CT chest  exam of December 30, 2021. Lungs are otherwise clear. Pleural  spaces are normal. No acute osseous abnormality.      Impression:      1.  Left upper lobe partially calcified granuloma is again noted  as seen on recent CT chest exam of December 30, 2021. This would  be consistent with old granulomatous disease.  2.  Otherwise unremarkable chest.    Electronically signed by:  Vikas Pitt MD  12/31/2021 9:31 AM CST  Workstation: [id number]          Chief Complaint on Day of Discharge: No complaints    Hospital Course:  The patient is a 61 y.o. male with medical history notable for A. fib, COPD, chronic systolic heart failure, and GERD who presented to [facility] with worsening shortness of breath and palpitations.  Patient was admitted for acute on chronic systolic heart failure and A. fib with RVR.  Patient was seen by cardiology and underwent cardioversion was successful conversion to sinus rhythm.  His blood pressures were initially low which hinder diuresis and he was started on dobutamine.  Patient was able to diurese well and felt good improvement.  He was able to be tolerated on oral lisinopril, Toprol-XL, and Lasix.  Patient's EF was still noted to be low despite ongoing treatment management and he was provided  "with a LifeVest.  Patient was otherwise noted to be in stable condition and cleared by cardiology for discharge.  He will follow-up with cardiology as an outpatient and return for a cath in the future to evaluate further.  Patient will be discharged home in stable condition.  Patient voiced understanding agreement.      Condition on Discharge: Stable    Physical Exam on Discharge:  /97 (BP Location: Right arm, Patient Position: Lying)   Pulse 73   Temp 96.8 °F (36 °C) (Temporal)   Resp 16   Ht 190.5 cm (75\")   Wt 86.7 kg (191 lb 2.2 oz)   SpO2 92%   BMI 23.89 kg/m²   Physical Exam  Constitutional:       General: He is not in acute distress.     Appearance: He is not toxic-appearing.   HENT:      Head: Normocephalic and atraumatic.      Right Ear: External ear normal.      Left Ear: External ear normal.      Nose: Nose normal.      Mouth/Throat:      Mouth: Mucous membranes are moist.      Pharynx: Oropharynx is clear.   Eyes:      Conjunctiva/sclera: Conjunctivae normal.   Cardiovascular:      Rate and Rhythm: Normal rate and regular rhythm.      Pulses: Normal pulses.      Heart sounds: Normal heart sounds.   Pulmonary:      Effort: Pulmonary effort is normal. No respiratory distress.      Breath sounds: Normal breath sounds.   Abdominal:      General: Bowel sounds are normal.      Palpations: Abdomen is soft.      Tenderness: There is no abdominal tenderness.   Musculoskeletal:         General: No swelling.      Cervical back: Neck supple.   Skin:     General: Skin is warm and dry.      Capillary Refill: Capillary refill takes less than 2 seconds.   Neurological:      General: No focal deficit present.      Mental Status: He is alert and oriented to person, place, and time. Mental status is at baseline.   Psychiatric:         Behavior: Behavior normal.         Thought Content: Thought content normal.         Discharge Disposition:  Home or Self Care    Discharge Medications:     Discharge Medications "      New Medications      Instructions Start Date   amiodarone 200 MG tablet  Commonly known as: PACERONE   200 mg, Oral, 2 Times Daily With Meals      apixaban 5 MG tablet tablet  Commonly known as: ELIQUIS   5 mg, Oral, Every 12 Hours Scheduled      furosemide 20 MG tablet  Commonly known as: LASIX   20 mg, Oral, 2 Times Daily      lisinopril 5 MG tablet  Commonly known as: PRINIVIL,ZESTRIL   5 mg, Oral, Every 24 Hours Scheduled   Start Date: January 9, 2022     LORazepam 1 MG tablet  Commonly known as: ATIVAN   1 mg, Oral, Nightly PRN      metoprolol succinate XL 25 MG 24 hr tablet  Commonly known as: TOPROL-XL   25 mg, Oral, Every 24 Hours Scheduled   Start Date: January 9, 2022        Continue These Medications      Instructions Start Date   aspirin 81 MG chewable tablet   325 mg, Oral, Daily      omeprazole 40 MG capsule  Commonly known as: priLOSEC   40 mg, Oral, Daily      tamsulosin 0.4 MG capsule 24 hr capsule  Commonly known as: FLOMAX   1 capsule, Oral, Daily      venlafaxine 75 MG tablet  Commonly known as: EFFEXOR   75 mg, Oral, 2 Times Daily         Stop These Medications    dilTIAZem  MG 24 hr capsule  Commonly known as: DILACOR XR            Discharge Diet:   Diet Instructions     Diet: Regular, Cardiac      Discharge Diet:  Regular  Cardiac       Fluid Restriction per day: 1000 mL Fluid          Activity at Discharge:   Activity Instructions     Gradually Increase Activity Until at Pre-Hospitalization Level            Discharge Care Plan/Instructions: Take medications as prescribed, follow-up with PCP, follow-up with cardiology, return for worsening symptoms.    Follow-up Appointments:   No future appointments.    Test Results Pending at Discharge: None    The patient has current or prior documentation of LVEF less than 40%, or moderate to severely depressed left ventricular systolic function. The patient was prescribed or already taking a beta-blocker.      The patient has current or prior  documentation of LVEF less than 40%, or moderate to severely depressed left ventricular systolic function. The patent was prescribed or already taking an ACE inhibitor or ARB.     Efrain Arevalo MD    Time: 32 minutes    Electronically signed by Efrain Arevalo MD at 01/08/22 4565

## 2022-01-13 ENCOUNTER — READMISSION MANAGEMENT (OUTPATIENT)
Dept: CALL CENTER | Facility: HOSPITAL | Age: 62
End: 2022-01-13

## 2022-01-13 NOTE — OUTREACH NOTE
CHF Week 1 Survey      Responses   [facility] patient discharged from? [place]   Does the patient have one of the following disease processes/diagnoses(primary or secondary)? CHF   CHF Week 1 attempt successful? Yes   Call start time 1402   Call end time 1407   Is patient permission given to speak with other caregiver? Yes   List who call center can speak with Daughter, Val   Person spoke with today (if not patient) and relationship Val   Meds reviewed with patient/caregiver? Yes   Is the patient having any side effects they believe may be caused by any medication additions or changes? No   Does the patient have all medications ordered at discharge? Yes   Is the patient taking all medications as directed (includes completed medication regime)? Yes   Does the patient have a primary care provider?  Yes   Does the patient have an appointment with their PCP within 7 days of discharge? Yes   Comments regarding PCP Griselda Garcia   Has the patient kept scheduled appointments due by today? N/A   Comments First appt. was today had to be rescheduled 01/24   Has home health visited the patient within 72 hours of discharge? N/A   Pulse Ox monitoring None   Psychosocial issues? No   Did the patient receive a copy of their discharge instructions? Yes   Nursing interventions Reviewed instructions with patient,  Educated on MyChart   What is the patient's perception of their health status since discharge? Improving   Nursing interventions Nurse provided patient education   Is the patient weighing daily? Yes   Does the patient have scales? Yes   Daily weight interventions Education provided on importance of daily weight   Is the patient able to teach back Heart Failure diet management? Yes   Is the patient able to teach back Heart Failure Zones? Yes   Is the patient able to teach back signs and symptoms of worsening condition? (i.e. weight gain, shortness of air, etc.) Yes   If the patient is a current smoker, are  they able to teach back resources for cessation? Not a smoker  [not cigarettes]   Is the patient/caregiver able to teach back the hierarchy of who to call/visit for symptoms/problems? PCP, Specialist, Home health nurse, Urgent Care, ED, 911 Yes   Additional teach back comments smokes marijuana    CHF Week 1 call completed? Yes   Wrap up additional comments Daughter says is doing well, just left her house, no new issues at this time, feet little swollen, she states.           Margaret Webster, RN

## 2022-01-21 ENCOUNTER — READMISSION MANAGEMENT (OUTPATIENT)
Dept: CALL CENTER | Facility: HOSPITAL | Age: 62
End: 2022-01-21

## 2022-01-21 NOTE — OUTREACH NOTE
CHF Week 2 Survey      Responses   [facility] patient discharged from? [place]   Does the patient have one of the following disease processes/diagnoses(primary or secondary)? CHF   Week 2 attempt successful? No   Unsuccessful attempts Attempt 1          Kerry Russo RN

## 2022-01-24 ENCOUNTER — PREP FOR SURGERY (OUTPATIENT)
Dept: OTHER | Facility: HOSPITAL | Age: 62
End: 2022-01-24

## 2022-01-24 ENCOUNTER — OFFICE VISIT (OUTPATIENT)
Dept: CARDIOLOGY | Facility: CLINIC | Age: 62
End: 2022-01-24

## 2022-01-24 VITALS
OXYGEN SATURATION: 96 % | HEART RATE: 56 BPM | WEIGHT: 187 LBS | BODY MASS INDEX: 23.25 KG/M2 | SYSTOLIC BLOOD PRESSURE: 102 MMHG | HEIGHT: 75 IN | DIASTOLIC BLOOD PRESSURE: 62 MMHG | TEMPERATURE: 98.4 F

## 2022-01-24 DIAGNOSIS — I50.23 ACUTE ON CHRONIC SYSTOLIC CHF (CONGESTIVE HEART FAILURE): Primary | ICD-10-CM

## 2022-01-24 DIAGNOSIS — I48.0 PAROXYSMAL ATRIAL FIBRILLATION WITH RAPID VENTRICULAR RESPONSE: ICD-10-CM

## 2022-01-24 DIAGNOSIS — F33.9 EPISODE OF RECURRENT MAJOR DEPRESSIVE DISORDER, UNSPECIFIED DEPRESSION EPISODE SEVERITY: ICD-10-CM

## 2022-01-24 PROCEDURE — 99215 OFFICE O/P EST HI 40 MIN: CPT | Performed by: NURSE PRACTITIONER

## 2022-01-24 RX ORDER — SODIUM CHLORIDE 0.9 % (FLUSH) 0.9 %
10 SYRINGE (ML) INJECTION AS NEEDED
Status: CANCELLED | OUTPATIENT
Start: 2022-02-08

## 2022-01-24 RX ORDER — AMIODARONE HYDROCHLORIDE 200 MG/1
200 TABLET ORAL DAILY
Qty: 30 TABLET | Refills: 3 | Status: SHIPPED | OUTPATIENT
Start: 2022-01-24 | End: 2022-02-23

## 2022-01-24 RX ORDER — FUROSEMIDE 20 MG/1
20 TABLET ORAL 2 TIMES DAILY
Qty: 60 TABLET | Refills: 3 | Status: SHIPPED | OUTPATIENT
Start: 2022-01-24 | End: 2022-02-23

## 2022-01-24 RX ORDER — METOPROLOL SUCCINATE 25 MG/1
25 TABLET, EXTENDED RELEASE ORAL
Qty: 30 TABLET | Refills: 3 | Status: SHIPPED | OUTPATIENT
Start: 2022-01-24 | End: 2022-02-23

## 2022-01-24 RX ORDER — SODIUM CHLORIDE 0.9 % (FLUSH) 0.9 %
3 SYRINGE (ML) INJECTION EVERY 12 HOURS SCHEDULED
Status: CANCELLED | OUTPATIENT
Start: 2022-02-08

## 2022-01-24 RX ORDER — LISINOPRIL 5 MG/1
5 TABLET ORAL
Qty: 30 TABLET | Refills: 3 | Status: SHIPPED | OUTPATIENT
Start: 2022-01-24 | End: 2022-02-23

## 2022-01-24 RX ORDER — ASPIRIN 81 MG/1
81 TABLET ORAL DAILY
Status: CANCELLED | OUTPATIENT
Start: 2022-01-25

## 2022-01-24 RX ORDER — ASPIRIN 81 MG/1
81 TABLET, CHEWABLE ORAL ONCE
Status: CANCELLED | OUTPATIENT
Start: 2022-01-24 | End: 2022-01-24

## 2022-01-24 NOTE — PROGRESS NOTES
[facility] CHF CLINIC OFFICE VISIT    Subjective:     Congestive Heart Failure (Chief Complaint)      Congestive Heart Failure  Presents for initial visit. The disease course has been improving. Associated symptoms include edema, fatigue and shortness of breath. Pertinent negatives include no abdominal pain, chest pain, chest pressure, claudication, near-syncope, nocturia, orthopnea, palpitations, paroxysmal nocturnal dyspnea or unexpected weight change. The symptoms have been improving. Past treatments include ACE inhibitors, beta blockers and angiotensin receptor blockers. The treatment provided moderate relief. Compliance with prior treatments has been good. His past medical history is significant for arrhythmia. There is no history of anemia, CAD, chronic lung disease, CVA, DM, DVT, HTN, hyperthyroidism, myocarditis, PE or valvular heart disease.         PCP:  Cardiologist: Dr. Mantilla    Hospitalizations:12/30/21-1/8/22 AF, CHF    Mr. Kd jiménez is a 61-year-old  male with past medical history of paroxysmal atrial fibrillation, BPH, chronic bronchitis, acute systolic heart failure, GERD, depression.  He has a remote history of paroxysmal atrial fibrillation status post EP study and A. fib ablation in the local area more than 3 years ago.  Patient was recently hospitalized for atrial fibrillation with RVR.  Patient was noted to be volume overloaded at that time and an acute systolic heart failure.  He underwent SANDEEP/DCCV on 1-3-22 which was successful at converting patient to NSR.  Unfortunately LVEF was noted to be low at 21 to 25%.  Patient was hypotensive in the hospital requiring dobutamine drip for inotropic support.  As well as diuresis.  Limted echo was repeated post cardioversion which showed LVEF at 28%.  Patient was recommended for ischemic evaluation to rule out underlying CAD prior to AICD placement and for low EF.  However patient declined and wanted to wait as an outpatient.  He was discharged  home with a LifeVest for primary prevention.      Today patient presents as a CHF hospital follow-up.  He reports compliance with medications and reports improving symptoms.  Reports shortness of breath consistent with New York Heart Association class III.  Mild edema to lower extremities.  He denies PND, orthopnea, palpitations, chest pain, dizziness or syncope.  He presents today with his daughter he is currently not wearing his LifeVest since Jan 8th and reports issues with vest fitting and does not know how to charge. I advised him to call [phone] number and get representative to come to his house.     Diet: little added salt  Activity: reports really improved since hospitalization  Fluids: water    Past Medical History:   Diagnosis Date   • Atrial fibrillation (HCC)    • BPH (benign prostatic hyperplasia)    • Chronic bronchitis (HCC)    • Chronic systolic heart failure (HCC)    • Depression    • GERD (gastroesophageal reflux disease)      Past Surgical History:   Procedure Laterality Date   • CARDIAC ELECTROPHYSIOLOGY STUDY AND ABLATION     • CARDIOVERSION       Social History     Socioeconomic History   • Marital status:    Tobacco Use   • Smoking status: Former Smoker   • Smokeless tobacco: Never Used   Substance and Sexual Activity   • Alcohol use: Not Currently   • Drug use: Not Currently   • Sexual activity: Not Currently     Family History   Problem Relation Age of Onset   • Hypertension Mother        Allergies:  No Known Allergies    Review of Systems   Constitutional: Positive for fatigue and malaise/fatigue. Negative for chills, fever, unexpected weight change and weight gain.   HENT: Negative for nosebleeds and tinnitus.    Eyes: Negative for blurred vision and double vision.   Cardiovascular: Positive for dyspnea on exertion and leg swelling. Negative for chest pain, claudication, irregular heartbeat, near-syncope, palpitations and syncope.   Respiratory: Positive for shortness of breath.  Negative for cough, sleep disturbances due to breathing and snoring.    Endocrine: Negative for polydipsia, polyphagia and polyuria.   Hematologic/Lymphatic: Negative for bleeding problem. Does not bruise/bleed easily.   Skin: Negative for color change and suspicious lesions.   Musculoskeletal: Negative for falls and myalgias.   Gastrointestinal: Negative for bloating, abdominal pain, heartburn and hematochezia.   Genitourinary: Negative for dysuria, hematuria and nocturia.   Neurological: Negative for dizziness, headaches, seizures, vertigo and weakness.   Psychiatric/Behavioral: Positive for depression. Negative for altered mental status. The patient is nervous/anxious. The patient does not have insomnia.    Allergic/Immunologic: Negative for environmental allergies and persistent infections.       Current Outpatient Medications   Medication Sig Dispense Refill   • amiodarone (PACERONE) 200 MG tablet Take 1 tablet by mouth Daily for 30 days. 30 tablet 3   • apixaban (ELIQUIS) 5 MG tablet tablet Take 1 tablet by mouth Every 12 (Twelve) Hours. Indications: Atrial Fibrillation 60 tablet 3   • aspirin 81 MG chewable tablet Chew 325 mg Daily.     • furosemide (LASIX) 20 MG tablet Take 1 tablet by mouth 2 (Two) Times a Day for 30 days. 60 tablet 3   • lisinopril (PRINIVIL,ZESTRIL) 5 MG tablet Take 1 tablet by mouth Daily for 30 days. 30 tablet 3   • metoprolol succinate XL (TOPROL-XL) 25 MG 24 hr tablet Take 1 tablet by mouth Daily for 30 days. 30 tablet 3   • omeprazole (priLOSEC) 40 MG capsule Take 40 mg by mouth Daily.     • tamsulosin (FLOMAX) 0.4 MG capsule 24 hr capsule Take 1 capsule by mouth Daily.     • venlafaxine (EFFEXOR) 75 MG tablet Take 75 mg by mouth 2 (Two) Times a Day.       No current facility-administered medications for this visit.        Objective:     Vitals:    01/24/22 1127   BP: 102/62   BP Location: Left arm   Patient Position: Sitting   Cuff Size: Adult   Pulse: 56   Temp: 98.4 °F (36.9 °C)  "  SpO2: 96%   Weight: 84.8 kg (187 lb)   Height: 190.5 cm (75\")       Wt Readings from Last 3 Encounters:   01/24/22 84.8 kg (187 lb)   01/08/22 86.7 kg (191 lb 2.2 oz)   12/30/21 87.1 kg (192 lb)            Vitals reviewed.   Constitutional:       General: Not in acute distress.     Appearance: Normal appearance. Well-developed. Not toxic-appearing or diaphoretic.   Eyes:      General: Lids are normal.      Conjunctiva/sclera: Conjunctivae normal.   HENT:      Head: Normocephalic and atraumatic.      Right Ear: External ear normal.      Left Ear: External ear normal.   Neck:      Vascular: No JVD.   Pulmonary:      Effort: Pulmonary effort is normal. No respiratory distress.      Breath sounds: Normal breath sounds. No decreased breath sounds. No wheezing. No rales.   Chest:      Chest wall: Not tender to palpatation.   Cardiovascular:      PMI at left midclavicular line. Normal rate. Regular rhythm. Normal S1 with normal intensity. Normal S2 with normal intensity.      Murmurs: There is no murmur.      No gallop. No S3 and S4 gallop. No click. No rub.   Pulses:     Intact distal pulses. No decreased pulses.   Edema:     Peripheral edema present.     Ankle: bilateral 1+ edema of the ankle.     Feet: bilateral 1+ edema of the feet.  Abdominal:      General: Bowel sounds are normal. There is no distension.      Palpations: Abdomen is soft.      Tenderness: There is no abdominal tenderness.   Musculoskeletal:      Cervical back: Normal range of motion and neck supple. Skin:     General: Skin is warm and dry.      Coloration: Skin is not pale.      Findings: No erythema or rash.   Neurological:      Mental Status: Alert and oriented to person, place, and time.      Gait: Gait normal.   Psychiatric:         Behavior: Behavior normal.         Thought Content: Thought content normal.         Judgment: Judgment normal.         Cardiographics  Results for orders placed during the hospital encounter of 12/31/21    Adult " Transthoracic Echo Limited W/ Cont if Necessary Per Protocol    Interpretation Summary  · Calculated left ventricular EF = 28% Left ventricular systolic function is severely decreased.  · There is mild, bileaflet mitral valve thickening present.  · Moderate mitral valve regurgitation is present.  · The right ventricular cavity is mildly dilated.  · The left ventricular cavity is mildly dilated.  · The right atrial cavity is mildly dilated.  · Left ventricular diastolic function was not assessed.      @[email or web address]    XR Chest 1 View    Result Date: 12/31/2021  1.  Left upper lobe partially calcified granuloma is again noted as seen on recent CT chest exam of December 30, 2021. This would be consistent with old granulomatous disease. 2.  Otherwise unremarkable chest. Electronically signed by:  Vikas Pitt MD  12/31/2021 9:31 AM CST Workstation: [id number]    XR Chest 1 View    Result Date: 12/30/2021  1. No radiographic evidence of acute cardiopulmonary disease. Electronically signed by:  Yi Mock MD  12/30/2021 10:58 AM CST Workstation: [id number]    US Renal Bilateral    Result Date: 1/4/2022  CONCLUSION: Normal kidneys. Minimal ascites. Left pleural effusion. [id number] Electronically signed by:  Lester Blackburn MD  1/4/2022 4:50 PM CST Workstation: [id number]    CT Angiogram Chest    Result Date: 12/30/2021  1. No evidence for pulmonary embolus. 2. Moderate-sized bilateral pleural effusions with dependent bibasilar atelectasis. Electronically signed by:  Yi Mock MD  12/30/2021 12:39 PM CST Workstation: [id number]    ECG:            Lab Review     No results found for: TSH  Lab Results   Component Value Date    GLUCOSE 133 (H) 01/08/2022    BUN 23 01/08/2022    CREATININE 1.03 01/08/2022    EGFRIFNONA 73 01/08/2022    BCR 22.3 01/08/2022    K 3.6 01/08/2022    CO2 27.0 01/08/2022    CALCIUM 8.9 01/08/2022    ALBUMIN 3.80 01/08/2022     (H) 01/08/2022     (H) 01/08/2022     Lab Results   Component  Value Date    WBC 6.65 01/08/2022    HGB 12.7 (L) 01/08/2022    HCT 37.3 (L) 01/08/2022    MCV 90.8 01/08/2022     01/08/2022       Lab Results   Component Value Date    TROPONINT <0.010 01/02/2022     Lab Results   Component Value Date    PROBNP 6,616.0 (H) 12/31/2021       PFTS:         The following portions of the patient's history were reviewed and updated as appropriate: allergies, current medications, past family history, past medical history, past social history, past surgical history and problem list.     Old records reviewed and pertinent information is included in the above objective data.     Assessment/Plan:      Diagnosis Plan   1. Acute on chronic systolic CHF (congestive heart failure) ([facility])  Ambulatory Referral to [facility]   2. Paroxysmal atrial fibrillation with rapid ventricular response ([facility])  Ambulatory Referral to [facility]   3. Episode of recurrent major depressive disorder, unspecified depression episode severity ([facility])  Ambulatory Referral to [facility]         #1.  Acute on chronic systolic CHF: EF:28%. NYHA Class III, Stage C. Patient appears euvolemic.  and in a well perfused physiologic state. Hemodynamics are acceptable  BETA-BLOCKER: Toprol XL 25mg  ACE/ARB: Lisinopril 5mg  ENTRESTO: n/a due to b/p  DIURETIC: Lasix 20mg daily, afternoon dose PRN   ALDOSTERONE ANTAGONIST: hemodynamics will not allow  IMDUR/HYDRALAZINE: N/A  DIGOXIN: N/A  Fluid restriction: 2 L  Sodium restriction:2 grams  ICD: Lifevest in place. Educated to call rep so can start wearing again. Will reassess LVEF in 3-4 months, may require ICD.  Patient will require ischemic evaluation to identify etiology of heart failure.  Patient reports never having cardiac catheterization previously. Will arrange LHC per Dr. Hendricks. Case was already discussed with him by Dr. Mantilla during hospitalization. Kidney function is now stable and doing well.  Patient will hold Eliquis 48 hrs prior to procedure.      [facility] Pre-Op:     Invasive coronary angiography was recommended to the patient.  The patient denies bleeding issues. The patient reports use of antiplatelet agents.  The patient reports CKD. The patient denies contrast allergy. The patient deniesuse of diabetes medications.        The indications, risks/benefits and alternatives of diagnostic left heart cardiac catheterization, angiography, conscious sedation, and possible blood transfusion were discussed in detail with the patient. The potential complications of 1/2000 chance of death, 1/1000 chance of heart attack or stroke, 1/500 chance of bleeding or clotting of the femoral artery, and 1/500 chance of allergic reaction to contrast were discussed. We also reviewed possible complications of infection and kidney dysfunction. If PCI were performed and intra-coronary stents indicated, we discussed the details about HALLE. This included a review of the risks of the infrequent, but relatively higher incidence of late thrombosis with HALLE. The importance of maintaining a consistent daily regimen of aspirin and an additional anti-platelet agent for as long as directed after implantation was emphasized. No contraindications were found. The patient  appeared to understand and agreed to the above.  -Left heart catheterization, Coronary angiography, Graft angiography, In-situ LIMA angiography, Left ventriculography, Intravascular ultrasound, Optical coherence tomography, Flow wire, Balloon Angioplasty, Coronary stent, Graft stent, Aortography, Iliofemoral angiography, Device closure, femoral artery, Intra-aortic balloon pump, Impella LV assist device implantation, Transvenous pacemaker, Pericardiocentesis and Subclavian angiography     ASA Class: II  Mallampati Score: II      Contraindications to DAPT: none    #2.  Paroxysmal atrial fibrillation: He underwent SANDEEP/DCCV on 1-3-22 which was successful at converting patient to NSR.  -Decrease amiodarone to 200 mg daily  -Continue  Toprol-XL 25 mg  -Continue oral anticoagulation with Eliquis 5 mg twice daily    #3. Anxiety/Depression: chronic, worsening. Order placed for patient to est care with Dr. Haney for other non-cardiac medical problems.     Follow up: 4-6 weeks post [facility].      45 minutes out of 60 minutes face to face spent counseling patient extensively on dietary Na+ intake, importance of activity, weight monitoring, compliance with medications and follow up appointments.            This document has been electronically signed by GERALD Hurtado on January 24, 2022 13:28 CST

## 2022-01-25 ENCOUNTER — TELEPHONE (OUTPATIENT)
Dept: CARDIOLOGY | Facility: CLINIC | Age: 62
End: 2022-01-25

## 2022-01-25 ENCOUNTER — HOSPITAL ENCOUNTER (OUTPATIENT)
Facility: HOSPITAL | Age: 62
Setting detail: HOSPITAL OUTPATIENT SURGERY
End: 2022-01-25
Attending: INTERNAL MEDICINE | Admitting: INTERNAL MEDICINE

## 2022-01-25 ENCOUNTER — READMISSION MANAGEMENT (OUTPATIENT)
Dept: CALL CENTER | Facility: HOSPITAL | Age: 62
End: 2022-01-25

## 2022-01-25 ENCOUNTER — PATIENT ROUNDING (BHMG ONLY) (OUTPATIENT)
Dept: CARDIOLOGY | Facility: CLINIC | Age: 62
End: 2022-01-25

## 2022-01-25 DIAGNOSIS — I50.23 ACUTE ON CHRONIC SYSTOLIC CHF (CONGESTIVE HEART FAILURE): ICD-10-CM

## 2022-01-25 DIAGNOSIS — I48.0 PAROXYSMAL ATRIAL FIBRILLATION WITH RAPID VENTRICULAR RESPONSE: ICD-10-CM

## 2022-01-25 NOTE — TELEPHONE ENCOUNTER
Patient scheduled for heart cath on 2/8/22. covid testing 2/7/22 between the hours of 9-9:45 am. They were instructed to hold eliquis 48 hr. Prior to cath. All instructions discussed with ms. Doshi.

## 2022-01-25 NOTE — PROGRESS NOTES
"January 25, 2022    Hello, may I speak with Kd Campa?    My name is Claire Joiner, Practice Manager      I am  with [facility] CARDIOLOGY [facility] CARDIOLOGY  [address]  [place] [address] [phone].    Before we get started may I verify your date of birth? 1960    I am calling to officially welcome you to our practice and ask about your recent visit. Is this a good time to talk? yes    Tell me about your visit with us. What things went well?  \"Everything went well, I really like Ms. Cummins, I was in & out of there with no problems.\".       We're always looking for ways to make our patients' experiences even better. Do you have recommendations on ways we may improve?  no    Overall were you satisfied with your first visit to our practice? yes       I appreciate you taking the time to speak with me today. Is there anything else I can do for you? no      Thank you, and have a great day.      "

## 2022-01-25 NOTE — TELEPHONE ENCOUNTER
Attempted to contact ms. dash regarding the patients heart cath. Voicemail left asking her to call the office.

## 2022-01-25 NOTE — OUTREACH NOTE
CHF Week 2 Survey      Responses   [facility] patient discharged from? [place]   Does the patient have one of the following disease processes/diagnoses(primary or secondary)? CHF   Week 2 attempt successful? Yes   Call start time 0930   Call end time 0932   Discharge diagnosis Paroxysmal atrial fibrillation with rapid ventricular response    Meds reviewed with patient/caregiver? Yes   Is the patient having any side effects they believe may be caused by any medication additions or changes? No   Is the patient taking all medications as directed (includes completed medication regime)? Yes   Has the patient kept scheduled appointments due by today? Yes   Comments Having ICD placed possibly in mid Feb.   Pulse Ox monitoring None   Psychosocial issues? No   Comments Denies SOA, LE edema mild comes and goes, he reports.    What is the patient's perception of their health status since discharge? Returned to baseline/stable   Nursing interventions Nurse provided patient education   Is the patient weighing daily? Yes   Daily weight interventions Education provided on importance of daily weight   Is the patient able to teach back signs and symptoms of worsening condition? (i.e. weight gain, shortness of air, etc.) Yes   CHF Week 2 call completed? Yes          Kerry Russo RN

## 2022-02-01 ENCOUNTER — READMISSION MANAGEMENT (OUTPATIENT)
Dept: CALL CENTER | Facility: HOSPITAL | Age: 62
End: 2022-02-01

## 2022-02-01 NOTE — OUTREACH NOTE
CHF Week 3 Survey      Responses   [facility] patient discharged from? [place]   Does the patient have one of the following disease processes/diagnoses(primary or secondary)? CHF   Week 3 attempt successful? Yes   Call start time 1117   Call end time 1120   Discharge diagnosis Paroxysmal atrial fibrillation with rapid ventricular response    Meds reviewed with patient/caregiver? Yes   Is the patient having any side effects they believe may be caused by any medication additions or changes? No   Does the patient have all medications ordered at discharge? Yes   Is the patient taking all medications as directed (includes completed medication regime)? Yes   Does the patient have a primary care provider?  Yes   Does the patient have an appointment with their PCP within 7 days of discharge? Yes   Has the patient kept scheduled appointments due by today? Yes   Has home health visited the patient within 72 hours of discharge? N/A   Pulse Ox monitoring None   Psychosocial issues? No   Did the patient receive a copy of their discharge instructions? Yes   Nursing interventions Reviewed instructions with patient   What is the patient's perception of their health status since discharge? Improving   Nursing interventions Nurse provided patient education   Is the patient weighing daily? Yes   Does the patient have scales? Yes   Is the patient able to teach back Heart Failure diet management? Yes   Is the patient able to teach back Heart Failure Zones? Yes   Is the patient able to teach back signs and symptoms of worsening condition? (i.e. weight gain, shortness of air, etc.) Yes   Is the patient/caregiver able to teach back the hierarchy of who to call/visit for symptoms/problems? PCP, Specialist, Home health nurse, Urgent Care, ED, 911 Yes   Additional teach back comments denies edema or SOB,    CHF Week 3 call completed? Yes          Lashanda Braxton RN

## 2022-02-09 ENCOUNTER — READMISSION MANAGEMENT (OUTPATIENT)
Dept: CALL CENTER | Facility: HOSPITAL | Age: 62
End: 2022-02-09

## 2022-02-09 NOTE — OUTREACH NOTE
CHF Week 4 Survey      Responses   [facility] patient discharged from? [place]   Does the patient have one of the following disease processes/diagnoses(primary or secondary)? CHF   Week 4 attempt successful? Yes   Call start time 1637   Call end time 1639   Discharge diagnosis Paroxysmal atrial fibrillation with rapid ventricular response    Person spoke with today (if not patient) and relationship patient   Meds reviewed with patient/caregiver? Yes   Is the patient having any side effects they believe may be caused by any medication additions or changes? No   Is the patient taking all medications as directed (includes completed medication regime)? Yes   Has the patient kept scheduled appointments due by today? N/A   Is the patient still receiving Home Health Services? N/A   Pulse Ox monitoring None   Psychosocial issues? No   What is the patient's perception of their health status since discharge? Improving   Nursing interventions Nurse provided patient education   Is the patient weighing daily? Yes   Does the patient have scales? Yes   Daily weight interventions Education provided on importance of daily weight   Is the patient able to teach back Heart Failure diet management? Yes   Is the patient able to teach back Heart Failure Zones? Yes   Is the patient able to teach back signs and symptoms of worsening condition? (i.e. weight gain, shortness of air, etc.) Yes   Week 4 Call Completed? Yes   Would the patient like one additional call? No   Graduated Yes   Is the patient interested in additional calls from an ambulatory ?  NOTE:  applies to high risk patients requiring additional follow-up. No   Did the patient feel the follow up calls were helpful during their recovery period? Yes   Was the number of calls appropriate? Yes   Does the patient have an Advance Directive or Living Will? No   Is the patient/caregiver familiar with Advance Care Planning? No   Would the patient like more  information on Advance Care Planning? No   Wrap up additional comments Pt doing ok. No questions/concerns.          Anna Rai RN

## 2022-02-15 ENCOUNTER — PREP FOR SURGERY (OUTPATIENT)
Dept: OTHER | Facility: HOSPITAL | Age: 62
End: 2022-02-15

## 2022-02-15 ENCOUNTER — TELEPHONE (OUTPATIENT)
Dept: CARDIOLOGY | Facility: CLINIC | Age: 62
End: 2022-02-15

## 2022-02-15 DIAGNOSIS — I50.23 ACUTE ON CHRONIC SYSTOLIC CHF (CONGESTIVE HEART FAILURE): Primary | ICD-10-CM

## 2022-02-15 DIAGNOSIS — I48.0 PAROXYSMAL ATRIAL FIBRILLATION WITH RAPID VENTRICULAR RESPONSE: ICD-10-CM

## 2022-02-15 RX ORDER — ASPIRIN 81 MG/1
81 TABLET, CHEWABLE ORAL ONCE
Status: CANCELLED | OUTPATIENT
Start: 2022-02-25 | End: 2022-02-15

## 2022-02-15 RX ORDER — ASPIRIN 81 MG/1
81 TABLET ORAL DAILY
Status: CANCELLED | OUTPATIENT
Start: 2022-02-25

## 2022-02-15 RX ORDER — SODIUM CHLORIDE 0.9 % (FLUSH) 0.9 %
10 SYRINGE (ML) INJECTION AS NEEDED
Status: CANCELLED | OUTPATIENT
Start: 2022-02-25

## 2022-02-15 RX ORDER — SODIUM CHLORIDE 0.9 % (FLUSH) 0.9 %
3 SYRINGE (ML) INJECTION EVERY 12 HOURS SCHEDULED
Status: CANCELLED | OUTPATIENT
Start: 2022-02-25

## 2022-02-15 NOTE — TELEPHONE ENCOUNTER
Contacted daughter to go over Pre-Procedure Instructions, Covid test 2/22/22 at 9:00am at same day surgery dirve through testing. Heart Cath 2/25/22 with Dr Nguyen. Hold Eliquis 48hours prior to precedure. NPO after midnight and bring a . Same Day surgery will call day before to give arrive time.    Patient/Daughter voiced understanding and we will mail out a Pre-procedure instruction packet to Daughter as well    ----- Message from Kendall Epley, MA sent at 2/15/2022  2:36 PM CST -----  Regarding: FW:   Contact: [phone]    ----- Message -----  From: Griselda Cummins APRN  Sent: 2/15/2022   2:35 PM CST  To: Kendall Epley, MA  Subject: RE:                                        Covid 2/22 Tuesday, [facility] on 2/25 with Dr. Nguyen. All orders in and I spoke with daughter. Can you call daughter with pre op instructions.       ----- Message -----  From: Epley, Kendall, MA  Sent: 2/14/2022   2:46 PM CST  To: GERALD Hurtado  Subject: FW: sultan Asha Villalobos, see below message. With Dr. Hendricks being out of office do you want to speak with another provider to have this heart cath?     ----- Message -----  From: Emma Serra RegSched Rep  Sent: 2/14/2022   2:41 PM CST  To: Kendall Epley, MA  Subject:                                            Pt daughter Val calling to reschedule pt procedure. Val number - [phone]

## 2022-02-22 ENCOUNTER — LAB (OUTPATIENT)
Dept: LAB | Facility: HOSPITAL | Age: 62
End: 2022-02-22

## 2022-02-22 DIAGNOSIS — I48.0 PAROXYSMAL ATRIAL FIBRILLATION WITH RAPID VENTRICULAR RESPONSE: ICD-10-CM

## 2022-02-22 DIAGNOSIS — I50.23 ACUTE ON CHRONIC SYSTOLIC CHF (CONGESTIVE HEART FAILURE): ICD-10-CM

## 2022-02-22 LAB — SARS-COV-2 N GENE RESP QL NAA+PROBE: NOT DETECTED

## 2022-02-22 PROCEDURE — 87635 SARS-COV-2 COVID-19 AMP PRB: CPT

## 2022-02-22 PROCEDURE — C9803 HOPD COVID-19 SPEC COLLECT: HCPCS

## 2022-02-25 ENCOUNTER — TELEPHONE (OUTPATIENT)
Dept: CARDIOLOGY | Facility: CLINIC | Age: 62
End: 2022-02-25

## 2022-02-25 NOTE — TELEPHONE ENCOUNTER
Patient is scheduled for procedure on 03/03/2022.    Patient will need to be COVID swabbed on 02/28/2022. At [facility] between 9:00-9:45 am.     You will [place] at [facility] the day of the procedure.    [facility] will call you the day before and let you know what time you will need to arrive.     Be sure to shower the night before or the morning of.  Avoid using any lotions or perfumes.    Nothing to eat or drink after MIDNIGHT    You may take your mediations with a small sips of water unless specified below.  Specific Medications: Hold Eliquis 48 hours prior.     Be sure to bring a  incase you are not admitted to the hospital. You will not be able to drive the day of your procedure.    Patient voiced their understandings.

## 2022-02-28 ENCOUNTER — LAB (OUTPATIENT)
Dept: LAB | Facility: HOSPITAL | Age: 62
End: 2022-02-28

## 2022-03-24 ENCOUNTER — TELEPHONE (OUTPATIENT)
Dept: CARDIOLOGY | Facility: CLINIC | Age: 62
End: 2022-03-24

## 2022-03-24 NOTE — TELEPHONE ENCOUNTER
Patient has not worn Lifevest wearable cardioverter defibrillator despite multiple conversations. Order not renewed due to noncompliance.               This document has been electronically signed by GERALD Hurtado on March 24, 2022 13:20 CDT

## 2022-03-30 ENCOUNTER — OFFICE VISIT (OUTPATIENT)
Dept: CARDIOLOGY | Facility: CLINIC | Age: 62
End: 2022-03-30

## 2022-03-30 VITALS
WEIGHT: 168 LBS | OXYGEN SATURATION: 93 % | SYSTOLIC BLOOD PRESSURE: 110 MMHG | HEART RATE: 74 BPM | TEMPERATURE: 97.5 F | BODY MASS INDEX: 20.89 KG/M2 | DIASTOLIC BLOOD PRESSURE: 70 MMHG | HEIGHT: 75 IN

## 2022-03-30 DIAGNOSIS — I48.92 ATRIAL FLUTTER WITH RAPID VENTRICULAR RESPONSE: ICD-10-CM

## 2022-03-30 DIAGNOSIS — I48.0 PAROXYSMAL ATRIAL FIBRILLATION WITH RAPID VENTRICULAR RESPONSE: Primary | ICD-10-CM

## 2022-03-30 DIAGNOSIS — I34.0 NONRHEUMATIC MITRAL VALVE REGURGITATION: ICD-10-CM

## 2022-03-30 DIAGNOSIS — I50.22 CHRONIC SYSTOLIC HEART FAILURE: ICD-10-CM

## 2022-03-30 PROCEDURE — 93000 ELECTROCARDIOGRAM COMPLETE: CPT | Performed by: INTERNAL MEDICINE

## 2022-03-30 PROCEDURE — 99214 OFFICE O/P EST MOD 30 MIN: CPT | Performed by: INTERNAL MEDICINE

## 2022-03-30 NOTE — PROGRESS NOTES
Kd Campa  62 y.o. male    03/30/2022  1. Paroxysmal atrial fibrillation with rapid ventricular response (HCC)    2. Atrial flutter with rapid ventricular response (HCC)    3. Chronic systolic heart failure (HCC)    4. Nonrheumatic mitral valve regurgitation        History of Present Illness:        62 years old patient presented today as a part of post hospital follow-up.  He is a status post process with got electrical cardioversion is a pleased with the clinical course outcome.  EKG in the office normal sinus rhythm with a nonspecific T wave changes in inferior and lateral leads.  Patient is positive cardiac catheterization during hospitalization but preferred to get it done as an outpatient.  He has severely reduced left and systolic function with ejection fraction in the range of 2025% right atrial cavity was moderate to severely dilated.  Moderate mitral and tricuspid regurgitations.  He admitted with acutely decompensated congestive heart failure due to severely reduced left and systolic function.  With background history of paroxysmal atrial fibrillation s/p EP study and A. fib ablation in the [place] area more than 3 years ago and a history of congestive heart failure have some weak muscle was on Lasix and blood pressure medicine discontinued.  The patient presented for evaluation increasing shortness of breath functional class III, no  orthopnea or PND noted in atrial fibrillation with rapid ventricular rate and clinically patient was in congestive heart failure with elevated BNP mild lower extremity edema and chest x-ray bilateral pleural effusion no evidence of pulmonary embolism on CT pulmonary angiogram.  Troponin is normal.  Patient received 80 mg Lasix yesterday with a significant provement in shortness of breath and good diuresis.  He has marginal hemodynamic and we are unable to uptitrate beta-blocker and to start Aldactone or ACE/ARB at this stage.  Patient is on IV amiodarone with  improvement in heart rate.  Last blood pressure systolic was 91 and echocardiogram severe LV dysfunction global hypokinesis significant elevated left atrial volume and moderate mitral and tricuspid regurgitations       Transesophageal echo 1/3/2022  · The left ventricular cavity is moderately dilated.  · Estimated left ventricular EF = 22% Left ventricular ejection fraction appears to be 21 - 25%. Left ventricular systolic function is severely decreased.  · Saline test results are negative.  · The right atrial cavity is moderate to severely dilated.  · Moderate mitral valve regurgitation is present.  · Moderate tricuspid valve regurgitation is present.    1/3/2022 cardioversion      INDICATIONS FOR PROCDEDURE:            External electrical cardioversion     MEDICATION(S) GIVEN TO PATIENT DURING THIS PROCEDURE:  1.  IV conscious sedation documented anesthesia     DESCRIPTION(S) OF THE PROCEDURE: The patient was brought to the cardiovascular laboratory in a fasting, non-sedated state. The risks and benefits of conscious sedation and external cardioversion were explained to the patient and written, informed consent was obtained. The patient was then prepped and draped in the usual sterile manner. The patient was found to be in atrial fibrillation with a ventricular rate of 120 beats per minute. After sedation was achieved, a 200-joule shock was then delivered with pressure applied to the anterior patches that restored the patient to normal sinus rhythm with a heart rate of 70 beats per minute. The patient tolerated the procedure without difficulty and was transferred to their room in a stable condition.      COMPLICATIONS: None.      FINAL IMPRESSIONS:    1. Successful external cardioversion of atrial fibrillation with a 200-joule shock to sinus rhythm.          1/7/2022    · Calculated left ventricular EF = 28% Left ventricular systolic function is severely decreased.  · There is mild, bileaflet mitral valve  thickening present.  · Moderate mitral valve regurgitation is present.  · The right ventricular cavity is mildly dilated.  · The left ventricular cavity is mildly dilated.  · The right atrial cavity is mildly dilated.  · Left ventricular diastolic function was not assessed.        Echo 1/1/2022     · The left ventricular cavity is moderately dilated.  · Estimated left ventricular EF = 22% Left ventricular ejection fraction appears to be 21 - 25%. Left ventricular systolic function is severely decreased.  · Left ventricular diastolic dysfunction is noted.  · Left atrial volume is severely increased.  · The right atrial cavity is mildly dilated.  · Moderate mitral valve regurgitation is present.  · Moderate tricuspid valve regurgitation is present.  · There is a left pleural effusion.           proBNP   0.0 - 900.0 pg/mL 6,616.0 High   7,792.0 High        CT pulmonary angiogram     IMPRESSION:  1. No evidence for pulmonary embolus.  2. Moderate-sized bilateral pleural effusions with dependent  bibasilar atelectasis.        SUBJECTIVE:    No Known Allergies      Past Medical History:   Diagnosis Date   • Atrial fibrillation (HCC)    • BPH (benign prostatic hyperplasia)    • Chronic bronchitis (HCC)    • Chronic systolic heart failure (HCC)    • Depression    • GERD (gastroesophageal reflux disease)          Past Surgical History:   Procedure Laterality Date   • CARDIAC ELECTROPHYSIOLOGY STUDY AND ABLATION     • CARDIOVERSION           Family History   Problem Relation Age of Onset   • Hypertension Mother          Social History     Socioeconomic History   • Marital status:    Tobacco Use   • Smoking status: Former Smoker   • Smokeless tobacco: Never Used   Substance and Sexual Activity   • Alcohol use: Not Currently   • Drug use: Not Currently   • Sexual activity: Not Currently         Current Outpatient Medications   Medication Sig Dispense Refill   • apixaban (ELIQUIS) 5 MG tablet tablet Take 1 tablet by  "mouth Every 12 (Twelve) Hours. Indications: Atrial Fibrillation 60 tablet 3   • aspirin 81 MG chewable tablet Chew 325 mg Daily.     • furosemide (LASIX) 20 MG tablet Take 1 tablet by mouth 2 (Two) Times a Day for 30 days. 60 tablet 3   • lisinopril (PRINIVIL,ZESTRIL) 5 MG tablet Take 1 tablet by mouth Daily for 30 days. 30 tablet 3   • metoprolol succinate XL (TOPROL-XL) 25 MG 24 hr tablet Take 1 tablet by mouth Daily for 30 days. 30 tablet 3   • omeprazole (priLOSEC) 40 MG capsule Take 40 mg by mouth Daily.     • tamsulosin (FLOMAX) 0.4 MG capsule 24 hr capsule Take 1 capsule by mouth Daily.     • venlafaxine (EFFEXOR) 75 MG tablet Take 75 mg by mouth 2 (Two) Times a Day.       No current facility-administered medications for this visit.           Review of Systems:     Constitutional:  Denies recent weight loss, weight gain,no change in exercise tolerance.     HENT:  Denies any hearing loss, epistaxis    Eyes: No blurring    Respiratory: Mild exertional dyspnea functional class I    Cardiovascular: See H&P    Gastrointestinal:  Denies change in bowel habits and dyspepsia    Endocrine: Negative for cold intolerance, heat intolerance, polydipsia    Genitourinary: Negative.      Musculoskeletal: History of osteoarthritis    Skin:  Deniesrashes, or skin lesions.     Allergic/Immunologic: Negative.  Negative for environmental allergies    Neurological:  Denies any history of recurrent headaches, strokes,     Hematological: Denies any food allergies, seasonal allergies    Psychiatric/Behavioral: Denies any history of depression        OBJECTIVE:    /70 (BP Location: Left arm, Patient Position: Sitting, Cuff Size: Adult)   Pulse 74   Temp 97.5 °F (36.4 °C)   Ht 190.5 cm (75\")   Wt 76.2 kg (168 lb)   SpO2 93%   BMI 21.00 kg/m²     Physical Exam:     Constitutional: Cooperative, alert and oriented, well-developed, well-nourished, in no acute distress.     HENT:   Head: Normocephalic, conjunctive is a pink, " thyroid is nonpalpable no carotid bruit and trachea central.     Cardiovascular: Regular rhythm, S1 and S2 normal, no S3 or S4. Apical impulse not displaced. No murmurs    Pulmonary/Chest: Chest: No chest wall tenderness no rales and wheezing    Abdominal: Abdomen soft, bowel sounds normoactive, no masses,    Musculoskeletal: No deformities, clubbing, cyanosis, erythema.     Neurological: No gross motor or sensory deficits noted    Skin: Warm and dry to the touch, no apparent skin lesions .     Psychiatric: He has a normal mood and affect. His behavior is normal        Procedures      Lab Results   Component Value Date    WBC 6.65 01/08/2022    HGB 12.7 (L) 01/08/2022    HCT 37.3 (L) 01/08/2022    MCV 90.8 01/08/2022     01/08/2022     Lab Results   Component Value Date    GLUCOSE 133 (H) 01/08/2022    BUN 23 01/08/2022    CREATININE 1.03 01/08/2022    EGFRIFNONA 73 01/08/2022    BCR 22.3 01/08/2022    CO2 27.0 01/08/2022    CALCIUM 8.9 01/08/2022    ALBUMIN 3.80 01/08/2022     (H) 01/08/2022     (H) 01/08/2022     No results found for: CHOL  No results found for: TRIG  No results found for: HDL  No components found for: LDLCALC  No results found for: LDL  No results found for: HDLLDLRATIO  No components found for: CHOLHDL  No results found for: HGBA1C  No results found for: TSH, I1LUZZE, H4TMXNV, THYROIDAB        ASSESSMENT AND PLAN:    #1 atrial fibrillation with rapid ventricular rate of unknown duration     Patient hospitalized to [facility] for evaluations of acutely decompensated congestive heart failure due to severely reduced left and systolic function noted atrial fibrillation/atrial flutter with rapid ventricular rate the possibility of tachycardia induced cardiomyopathy cannot be excluded.  Patient underwent transthoracic electrical cardioversions.  He presented post hospital follow-up.  EKG in the office sinus rhythm he has significant provement in quality of  life.  We will continue Eliquis lisinopril and metoprolol.    [id number] score  Is 2         #2 congestive heart failure possible acute on chronic     Patient received reduced left and systolic function and scheduled to have cardiac catheterization but preferred to get it done as an outpatient.  Currently is in sinus rhythm.  I will discuss with Dr. Hendricks will schedule a cardiac catheterization as an outpatient given the severely reduced left and systolic function patient's age and other comorbid conditions.  We will continue lisinopril and metoprolol Lasix  Procedure risk regarding the cardiac catheterization discussed with the patient.  Risk included but not limited to infection, bleeding, stroke, hematoma, myocardial infarction, soft tissue injuries.  Risk range less than 1 to 5%.  Risk related to IV contrast and sedation also discussed with the patient.  He is a Mallampati score 2 and ASA class II.    Restrict 1000 fluid     Sodium 2 g to  Patient was counseled educated avoid nonsteroid and drink        #3 moderate mitral tricuspid regurgitations will reassess after electrical cardioversions as an outpatient    Patient repeat echocardiogram has improvement in mitral tricuspid regurgitation    I will see him in 6 months undependable outcome of cardiac catheterizations    I spent 28 minutes caring for Kd on this date of service. This time includes time spent by me of counseling/coordination of care as relates to the presenting problem and any ordered procedures/tests as outlined above.        Diagnoses and all orders for this visit:    1. Paroxysmal atrial fibrillation with rapid ventricular response (HCC) (Primary)  -     ECG 12 Lead    2. Atrial flutter with rapid ventricular response (HCC)    3. Chronic systolic heart failure (HCC)    4. Nonrheumatic mitral valve regurgitation          Altagracia Mantilla MD  3/30/2022  09:11 CDT

## 2022-03-31 ENCOUNTER — TELEPHONE (OUTPATIENT)
Dept: CARDIOLOGY | Facility: CLINIC | Age: 62
End: 2022-03-31

## 2022-03-31 ENCOUNTER — PREP FOR SURGERY (OUTPATIENT)
Dept: OTHER | Facility: HOSPITAL | Age: 62
End: 2022-03-31

## 2022-03-31 DIAGNOSIS — I50.22 CHRONIC SYSTOLIC HEART FAILURE: Primary | ICD-10-CM

## 2022-03-31 RX ORDER — ASPIRIN 81 MG/1
81 TABLET, CHEWABLE ORAL ONCE
Status: CANCELLED | OUTPATIENT
Start: 2022-03-31 | End: 2022-03-31

## 2022-03-31 RX ORDER — SODIUM CHLORIDE 0.9 % (FLUSH) 0.9 %
3 SYRINGE (ML) INJECTION EVERY 12 HOURS SCHEDULED
Status: CANCELLED | OUTPATIENT
Start: 2022-03-31

## 2022-03-31 RX ORDER — SODIUM CHLORIDE 0.9 % (FLUSH) 0.9 %
10 SYRINGE (ML) INJECTION AS NEEDED
Status: CANCELLED | OUTPATIENT
Start: 2022-03-31

## 2022-03-31 RX ORDER — ASPIRIN 81 MG/1
81 TABLET ORAL DAILY
Status: CANCELLED | OUTPATIENT
Start: 2022-04-01

## 2022-03-31 NOTE — TELEPHONE ENCOUNTER
Patient was contacted with date and time of left heart cath. It was scheduled for April 5th. The patient states he is unable to get a ride to the procedure and would prefer to call back for a different date and April. I will touch base with the appt next week.

## 2022-04-01 LAB
QT INTERVAL: 416 MS
QTC INTERVAL: 461 MS

## 2022-04-07 ENCOUNTER — TELEPHONE (OUTPATIENT)
Dept: CARDIOLOGY | Facility: CLINIC | Age: 62
End: 2022-04-07

## 2022-04-07 NOTE — TELEPHONE ENCOUNTER
Contacted patient to see if he is ready to schedule his heart cath. The patient states he is still unable to get a ride.